# Patient Record
Sex: MALE | Race: WHITE | NOT HISPANIC OR LATINO | Employment: FULL TIME | URBAN - METROPOLITAN AREA
[De-identification: names, ages, dates, MRNs, and addresses within clinical notes are randomized per-mention and may not be internally consistent; named-entity substitution may affect disease eponyms.]

---

## 2018-05-10 NOTE — PATIENT INSTRUCTIONS
1.  Labs today  2.  Cleared for surgery, I will fax preop to Dr. Patel and Dr. Ruiz    Before Your Surgery      Call your surgeon if there is any change in your health. This includes signs of a cold or flu (such as a sore throat, runny nose, cough, rash or fever).    Do not smoke, drink alcohol or take over the counter medicine (unless your surgeon or primary care doctor tells you to) for the 24 hours before and after surgery.    If you take prescribed drugs: Follow your doctor s orders about which medicines to take and which to stop until after surgery.    Eating and drinking prior to surgery: follow the instructions from your surgeon    Take a shower or bath the night before surgery. Use the soap your surgeon gave you to gently clean your skin. If you do not have soap from your surgeon, use your regular soap. Do not shave or scrub the surgery site.  Wear clean pajamas and have clean sheets on your bed.

## 2018-05-10 NOTE — PROGRESS NOTES
[facility]  [address] [place] [address] [id number] [phone]  Dept: [phone]    PRE-OP EVALUATION:  Today's date: 2018    Olu Trinh (: 1985) presents for pre-operative evaluation assessment as requested by Dr. Alistair Patel, Dr. Ruiz.  He requires evaluation and anesthesia risk assessment prior to undergoing surgery/procedure for treatment of epilepsy .    Proposed Surgery/ Procedure: intracranial EEG monitoring off antiseizure medications  Date of Surgery/ Procedure: 18  Time of Surgery/ Procedure: to be determined  Hospital/Surgical Facility: [facility]  Fax number for surgical facility: Dr. Alistair Patel [phone], Dr. Patel [phone]  Primary Physician: Andrew Walton  Type of Anesthesia Anticipated: to be determined    Patient has a Health Care Directive or Living Will:  YES     1. NO - Do you have a history of heart attack, stroke, stent, bypass or surgery on an artery in the head, neck, heart or legs?  2. NO - Do you ever have any pain or discomfort in your chest?  3. NO - Do you have a history of  Heart Failure?  4. NO - Are you troubled by shortness of breath when: walking on the level, up a slight hill or at night?  5. NO - Do you currently have a cold, bronchitis or other respiratory infection?  6. NO - Do you have a cough, shortness of breath or wheezing?  7. NO - Do you sometimes get pains in the calves of your legs when you walk?  8. NO - Do you or anyone in your family have previous history of blood clots?  9. NO - Do you or does anyone in your family have a serious bleeding problem such as prolonged bleeding following surgeries or cuts?  10. NO - Have you ever had problems with anemia or been told to take iron pills?  11. NO - Have you had any abnormal blood loss such as black, tarry or bloody stools, or abnormal vaginal bleeding?  12. NO - Have you ever had a blood transfusion?  13. NO - Have you or any of your relatives ever had  problems with anesthesia?  14. NO - Do you have sleep apnea, excessive snoring or daytime drowsiness?  15. NO - Do you have any prosthetic heart valves?  16. NO - Do you have prosthetic joints?  17. NO - Is there any chance that you may be pregnant?      HPI:     HPI related to upcoming procedure: hx of epilepsy, follows with Dr. Patel neurologist [place] [facility].  Pt continues to have frequent seizures every 1-3 months despite trying multiple antiseizure regimens.  Plan is for intercranial EEG monitoring for 10 days in the hospital while stopping antiseizure meds.  Eventual plan is for surgery to fix seizures.      ADHD- not on medication.  Does fine in day to day life, would probably need medicine if he went back to school    Hx of neurogenic bladder.   h/o bladder extrophy and s/p bladder augmentation, bladder neck reconstruction (Young Mayra Leadbetter) and creation of Mitrofanoff channel in 2002.  Will be seeing urology at [facility] next week, had CT scan done.  He straight caths through his urostomy every 3-5 hours.      Works for [employer].        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 He is not wheel chair bound. He uses 14F cath for CIC.        Other hydronephrosis 06/02/2015     Priority: Medium     Bladder stone 06/02/2015     Priority: Medium     Neurogenic bladder 10/30/2012     Priority: Medium     Problem list name updated by automated process. Provider to review       Attention deficit hyperactivity disorder (ADHD) 11/06/2006     Priority: Medium     Problem list name updated by automated process. Provider to review         Past Medical History:   Diagnosis Date     Attention deficit disorder with hyperactivity(314.01)      Bladder stone 6/2/2015     Exstrophy of bladder sequence 6/29/2015     Learning disability      Neurogenic bladder, NOS 10/30/2012     Other hydronephrosis 6/2/2015     Unspecified epilepsy with intractable epilepsy 10/22/2013     Past Surgical History:   Procedure Laterality Date     BLADDER AUGMENTATION       bladder neck reconstruction      Mikey MONGE REMOVAL OF KIDNEY STONE       LASER HOLMIUM LITHOTRIPSY BLADDER N/A 7/24/2015    Procedure: LASER HOLMIUM LITHOTRIPSY BLADDER;  Surgeon: Joel Blunt MD;  Location: [facility] OR     MITROFANOFF PROCEDURE (APPENDIX CONDUIT)  2002     Current Outpatient Prescriptions   Medication Sig Dispense Refill     carBAMazepine (TEGRETOL XR) 200 MG 12 hr tablet Take 4 tablets (800 mg) by mouth 2 times daily Take 1 tab po pm ( together with 400 mg tab ) 1 tablet 0     lamoTRIgine (LAMICTAL) 100 MG tablet 1.5 tabs twice a day 1 tablet 0     levETIRAcetam 1000 MG TABS 2.5 tabs twice a day 1 tablet 0     [DISCONTINUED] carBAMazepine (TEGRETOL XR) 200 MG 12 hr tablet Take 1 tab po pm ( together with 400 mg tab ) (Patient taking differently: 800 mg every morning Take 1 tab po pm ( together with 400 mg tab )) 30 tablet 2     [DISCONTINUED] carBAMazepine (TEGRETOL XR) 400 MG 12 hr tablet Take 1 tablet (400 mg) by mouth 2 times daily (Patient taking differently: Take 1,000 mg by mouth At Bedtime ) 180 tablet 2     [DISCONTINUED] lamoTRIgine (LAMICTAL) 100 MG tablet 2 tab po am and 1 tab po pm. 270 tablet 1     [DISCONTINUED] levETIRAcetam 1000 MG TABS Take 2 tablets by mouth 2 times daily. 360 tablet 0     OTC products: no recent use of OTC ASA, NSAIDS or Steroids    Allergies   Allergen Reactions     Sulfa Drugs      unknown      Latex Allergy: NO    Social History   Substance Use Topics     Smoking status: Never Smoker     Smokeless tobacco: Never Used     Alcohol  "use Yes      Comment: Alcoholic beverage once or twice per week.     History   Drug Use Not on file       REVIEW OF SYSTEMS:   CONSTITUTIONAL: NEGATIVE for fever, chills, change in weight  INTEGUMENTARY/SKIN: NEGATIVE for worrisome rashes, moles or lesions  EYES: NEGATIVE for vision changes or irritation  ENT/MOUTH: NEGATIVE for ear, mouth and throat problems  RESP: NEGATIVE for significant cough or SOB  CV: NEGATIVE for chest pain, palpitations or peripheral edema  GI: NEGATIVE for nausea, abdominal pain, heartburn, or change in bowel habits   male : as above   MUSCULOSKELETAL: NEGATIVE for significant arthralgias or myalgia  NEURO: as above   PSYCHIATRIC: NEGATIVE for changes in mood or affect    EXAM:   /82  Pulse 55  Temp 97.4  F (36.3  C) (Temporal)  Resp 18  Ht 5' 11.25\" (1.81 m)  Wt 240 lb 8 oz (109.1 kg)  SpO2 99%  BMI 33.31 kg/m2    GENERAL APPEARANCE: healthy, alert and no distress     EYES: EOMI,  PERRL     HENT: ear canals and TM's normal and nose and mouth without ulcers or lesions     NECK: no adenopathy, no asymmetry, masses, or scars and thyroid normal to palpation     RESP: lungs clear to auscultation - no rales, rhonchi or wheezes     CV: regular rates and rhythm, normal S1 S2, no S3 or S4 and no murmur, click or rub     ABDOMEN:  soft, nontender, no HSM or masses and bowel sounds normal     MS: extremities normal- no gross deformities noted, no evidence of inflammation in joints, FROM in all extremities.     SKIN: no suspicious lesions or rashes     NEURO: Normal strength and tone, sensory exam grossly normal, mentation intact and speech normal     PSYCH: mentation appears normal. and affect normal/bright     LYMPHATICS: No cervical adenopathy    DIAGNOSTICS:   EKG: Not indicated due to non-vascular surgery and low risk of event (age <65 and without cardiac risk factors)  BMP pending    Recent Labs   Lab Test  06/01/15   1007  08/08/13   1415   HGB  15.2  15.4   PLT  184  211   NA  " 140  144   POTASSIUM  3.9  4.5   CR  0.87  0.93        IMPRESSION:   Reason for surgery/procedure: epilepsy     The proposed surgical procedure is considered INTERMEDIATE risk.    REVISED CARDIAC RISK INDEX  The patient has the following serious cardiovascular risks for perioperative complications such as (MI, PE, VFib and 3  AV Block):  No serious cardiac risks  INTERPRETATION: 0 risks: Class I (very low risk - 0.4% complication rate)    The patient has the following additional risks for perioperative complications:  No identified additional risks      ICD-10-CM    1. Pre-op exam Z01.818    2. Intractable epilepsy without status epilepticus, unspecified epilepsy type (H) G40.919    3. Neurogenic bladder N31.9 Basic metabolic panel   4. CARDIOVASCULAR SCREENING; LDL GOAL LESS THAN 130 Z13.6 Lipid panel reflex to direct LDL Fasting       RECOMMENDATIONS:     --Consult hospital rounder / IM to assist post-op medical management    --Patient is to take all scheduled medications on the day of surgery EXCEPT as discussed with his neurologist     APPROVAL GIVEN to proceed with proposed procedure, without further diagnostic evaluation       Signed Electronically by: PATRICIA Wilkinson CNP    Copy of this evaluation report is provided to requesting physician.    [facility] Preop Guidelines    Revised Cardiac Risk Index

## 2018-05-11 ENCOUNTER — OFFICE VISIT (OUTPATIENT)
Dept: FAMILY MEDICINE | Facility: CLINIC | Age: 33
End: 2018-05-11
Payer: COMMERCIAL

## 2018-05-11 VITALS
WEIGHT: 240.5 LBS | DIASTOLIC BLOOD PRESSURE: 82 MMHG | BODY MASS INDEX: 33.67 KG/M2 | SYSTOLIC BLOOD PRESSURE: 134 MMHG | TEMPERATURE: 97.4 F | RESPIRATION RATE: 18 BRPM | HEIGHT: 71 IN | HEART RATE: 55 BPM | OXYGEN SATURATION: 99 %

## 2018-05-11 DIAGNOSIS — Z13.6 CARDIOVASCULAR SCREENING; LDL GOAL LESS THAN 130: ICD-10-CM

## 2018-05-11 DIAGNOSIS — Z01.818 PRE-OP EXAM: Primary | ICD-10-CM

## 2018-05-11 DIAGNOSIS — N31.9 NEUROGENIC BLADDER: ICD-10-CM

## 2018-05-11 DIAGNOSIS — G40.919 INTRACTABLE EPILEPSY WITHOUT STATUS EPILEPTICUS, UNSPECIFIED EPILEPSY TYPE (H): ICD-10-CM

## 2018-05-11 PROCEDURE — 99214 OFFICE O/P EST MOD 30 MIN: CPT | Performed by: NURSE PRACTITIONER

## 2018-05-11 PROCEDURE — 80061 LIPID PANEL: CPT | Performed by: NURSE PRACTITIONER

## 2018-05-11 PROCEDURE — 80048 BASIC METABOLIC PNL TOTAL CA: CPT | Performed by: NURSE PRACTITIONER

## 2018-05-11 PROCEDURE — 36415 COLL VENOUS BLD VENIPUNCTURE: CPT | Performed by: NURSE PRACTITIONER

## 2018-05-11 RX ORDER — LAMOTRIGINE 100 MG/1
200 TABLET ORAL
Qty: 1 TABLET | Refills: 0 | COMMUNITY
Start: 2018-05-11 | End: 2021-01-25

## 2018-05-11 RX ORDER — CARBAMAZEPINE 200 MG/1
800 TABLET, EXTENDED RELEASE ORAL 2 TIMES DAILY
Qty: 1 TABLET | Refills: 0 | COMMUNITY
Start: 2018-05-11 | End: 2021-01-25

## 2018-05-11 RX ORDER — LEVETIRACETAM 1000 MG/1
2000 TABLET ORAL
Qty: 1 TABLET | Refills: 0 | COMMUNITY
Start: 2018-05-11 | End: 2021-01-25

## 2018-05-11 NOTE — LETTER
May 14, 2018      Olu Knowles Gayathri  [address]  [facility] [address]        Dear ,    We are writing to inform you of your test results.    Your blood sugar was normal indicating you do not have diabetes.    Normal kidney function and electrolytes.    The results of your recent lipid (cholesterol) profile were abnormal.     Desired or goal lipid levels are:  CHOLESTEROL: Desirable is less than 200.  HDL (Good Cholesterol): Desirable is greater than 40 in men and greater than 50 in women.  LDL (Bad Cholesterol): Desirable is less than 130 or less than 100 in patients with diabetes or vascular disease. For some patients with diabetes or vascular disease, the desireable LDL is less than 70.  TRIGLYCERIDES: Desirable is less than 150.      As you may know, an elevated cholesterol is one factor that increases your risk for heart disease and stroke.  Cholesterol can be somewhat influenced by diet but there is a large genetic component as well.    The Mediterranean diet has some evidence for improving cholesterol and reducing cardiovascular risk.  Such a diet is typically high in fruits, vegetables, whole grains, beans, nuts, and seeds and includes olive oil as an important source of fat; there are typically low to moderate amounts of fish, poultry, and dairy products, and there is little red meat.    Also consider starting or increasing your aerobic activity.  Aerobic activity is the best way to improve HDL (good) cholesterol.  If this would be new to you, please talk with me first about what activities are safe for you.    Given your elevated cholesterol we could consider starting a 'statin' cholesterol medication to lower your risk of heart attack and stroke.  The most common side effects of this medication are muscle aches, but most patients do very well on statins.  Please let me know if you would like to start this medication, or we could schedule an office visit to discuss in more detail.    Starting a 'statin' cholesterol medication would help reduce your risk. This is a once aday pill, it is usually well tolerated, the most common side effect is muscle aches but most of my patients do well on it. Please give me a call if you would like to start this medication or schedule an office visit and we can discuss further.    Resulted Orders   Lipid panel reflex to direct LDL Fasting   Result Value Ref Range    Cholesterol 291 (H) <200 mg/dL      Comment:      Desirable:       <200 mg/dl    Triglycerides 139 <150 mg/dL      Comment:      Non Fasting    HDL Cholesterol 58 >39 mg/dL    LDL Cholesterol Calculated 205 (H) <100 mg/dL      Comment:      Above desirable:  100-129 mg/dl  Borderline High:  130-159 mg/dL  High:             160-189 mg/dL  Very high:       >189 mg/dl      Non HDL Cholesterol 233 (H) <130 mg/dL      Comment:      Above Desirable:  130-159 mg/dl  Borderline high:  160-189 mg/dl  High:             190-219 mg/dl  Very high:       >219 mg/dl     Basic metabolic panel   Result Value Ref Range    Sodium 142 133 - 144 mmol/L    Potassium 4.3 3.4 - 5.3 mmol/L    Chloride 107 94 - 109 mmol/L    Carbon Dioxide 31 20 - 32 mmol/L    Anion Gap 4 3 - 14 mmol/L    Glucose 90 70 - 99 mg/dL      Comment:      Non Fasting    Urea Nitrogen 18 7 - 30 mg/dL    Creatinine 0.81 0.66 - 1.25 mg/dL    GFR Estimate >90 >60 mL/min/1.7m2      Comment:      Non  GFR Calc    GFR Estimate If Black >90 >60 mL/min/1.7m2      Comment:       GFR Calc    Calcium 9.6 8.5 - 10.1 mg/dL       If you have any questions or concerns, please call the clinic at the number listed above.     Sincerely,    Renetta Krause, APRN CNP/nr

## 2018-05-11 NOTE — MR AVS SNAPSHOT
After Visit Summary   5/11/2018    Olu Trinh    MRN: [id number]           Patient Information     Date Of Birth          1985        Visit Information        Provider Department      5/11/2018 11:00 AM Renetta Krause APRN CNP [facility]        Today's Diagnoses     Encounter for routine adult health examination without abnormal findings    -  1    Other hydronephrosis        Neurogenic bladder        CARDIOVASCULAR SCREENING; LDL GOAL LESS THAN 130        Preop general physical exam          Care Instructions    1.  Labs today  2.  Cleared for surgery, I will fax preop to Dr. Patel and Dr. Ruiz    Before Your Surgery      Call your surgeon if there is any change in your health. This includes signs of a cold or flu (such as a sore throat, runny nose, cough, rash or fever).    Do not smoke, drink alcohol or take over the counter medicine (unless your surgeon or primary care doctor tells you to) for the 24 hours before and after surgery.    If you take prescribed drugs: Follow your doctor s orders about which medicines to take and which to stop until after surgery.    Eating and drinking prior to surgery: follow the instructions from your surgeon    Take a shower or bath the night before surgery. Use the soap your surgeon gave you to gently clean your skin. If you do not have soap from your surgeon, use your regular soap. Do not shave or scrub the surgery site.  Wear clean pajamas and have clean sheets on your bed.           Follow-ups after your visit        Who to contact     If you have questions or need follow up information about today's clinic visit or your schedule please contact [facility] directly at [phone].  Normal or non-critical lab and imaging results will be communicated to you by MyChart, letter or phone within 4 business days after the clinic has received the results. If you do not hear from us within 7 days, please contact  "the clinic through [employer] or phone. If you have a critical or abnormal lab result, we will notify you by phone as soon as possible.  Submit refill requests through [employer] or call your pharmacy and they will forward the refill request to us. Please allow 3 business days for your refill to be completed.          Additional Information About Your Visit        [employer] Information     [employer] lets you send messages to your doctor, view your test results, renew your prescriptions, schedule appointments and more. To sign up, go to [email or web address] . Click on \"Log in\" on the left side of the screen, which will take you to the Welcome page. Then click on \"Sign up Now\" on the right side of the page.     You will be asked to enter the access code listed below, as well as some personal information. Please follow the directions to create your username and password.     Your access code is: [email or web address]  Expires: 2018 11:38 AM     Your access code will  in 90 days. If you need help or a new code, please call your [facility] clinic or [phone].        Care EveryWhere ID     This is your Care EveryWhere ID. This could be used by other organizations to access your [facility] medical records  [id number]        Your Vitals Were     Pulse Temperature Respirations Height Pulse Oximetry BMI (Body Mass Index)    55 97.4  F (36.3  C) (Temporal) 18 5' 11.25\" (1.81 m) 99% 33.31 kg/m2       Blood Pressure from Last 3 Encounters:   18 134/82   11/16/15 128/81   10/05/15 148/75    Weight from Last 3 Encounters:   18 240 lb 8 oz (109.1 kg)   11/16/15 241 lb (109.3 kg)   10/05/15 242 lb 11.2 oz (110.1 kg)              We Performed the Following     Basic metabolic panel     Lipid panel reflex to direct LDL Fasting          Today's Medication Changes          These changes are accurate as of 18 11:38 AM.  If you have any questions, ask your nurse or doctor.               These medicines have changed or have " updated prescriptions.        Dose/Directions    carBAMazepine 200 MG 12 hr tablet   Commonly known as:  TEGretol XR   This may have changed:    - how much to take  - how to take this  - when to take this  - Another medication with the same name was removed. Continue taking this medication, and follow the directions you see here.   Changed by:  Renetta Krause APRN CNP        Dose:  800 mg   Take 4 tablets (800 mg) by mouth 2 times daily Take 1 tab po pm ( together with 400 mg tab )   Quantity:  1 tablet   Refills:  0       lamoTRIgine 100 MG tablet   Commonly known as:  LaMICtal   This may have changed:  additional instructions   Changed by:  Renetta Krause APRN CNP        1.5 tabs twice a day   Quantity:  1 tablet   Refills:  0       levETIRAcetam 1000 MG Tabs   This may have changed:  See the new instructions.   Changed by:  Renetta Krause APRN CNP        2.5 tabs twice a day   Quantity:  1 tablet   Refills:  0                Primary Care Provider Office Phone # Fax #    Andrew Walton [phone] [address] [facility] [phone]        Equal Access to Services     [facility]: Hadii daniel stephens hadasho Soeneidaali, waaxda luqadaha, qaybta kaalmada adeegyada, micaela patel . So [facility] [phone].    ATENCIÓN: Si habla español, tiene a recio disposición servicios gratuitos de asistencia lingüística. [facility] [phone].    We comply with applicable federal civil rights laws and [place] laws. We do not discriminate on the basis of race, color, national origin, age, disability, sex, sexual orientation, or gender identity.            Thank you!     Thank you for choosing [facility]  for your care. Our goal is always to provide you with excellent care. Hearing back from our patients is one way we can continue to improve our services. Please take a few minutes to complete the written survey that you may receive in the  mail after your visit with us. Thank you!             Your Updated Medication List - Protect others around you: Learn how to safely use, store and throw away your medicines at www.disposemymeds.org.          This list is accurate as of 5/11/18 11:38 AM.  Always use your most recent med list.                   Brand Name Dispense Instructions for use Diagnosis    carBAMazepine 200 MG 12 hr tablet    TEGretol XR    1 tablet    Take 4 tablets (800 mg) by mouth 2 times daily Take 1 tab po pm ( together with 400 mg tab )        lamoTRIgine 100 MG tablet    LaMICtal    1 tablet    1.5 tabs twice a day        levETIRAcetam 1000 MG Tabs     1 tablet    2.5 tabs twice a day

## 2018-05-12 LAB
ANION GAP SERPL CALCULATED.3IONS-SCNC: 4 MMOL/L (ref 3–14)
BUN SERPL-MCNC: 18 MG/DL (ref 7–30)
CALCIUM SERPL-MCNC: 9.6 MG/DL (ref 8.5–10.1)
CHLORIDE SERPL-SCNC: 107 MMOL/L (ref 94–109)
CHOLEST SERPL-MCNC: 291 MG/DL
CO2 SERPL-SCNC: 31 MMOL/L (ref 20–32)
CREAT SERPL-MCNC: 0.81 MG/DL (ref 0.66–1.25)
GFR SERPL CREATININE-BSD FRML MDRD: >90 ML/MIN/1.7M2
GLUCOSE SERPL-MCNC: 90 MG/DL (ref 70–99)
HDLC SERPL-MCNC: 58 MG/DL
LDLC SERPL CALC-MCNC: 205 MG/DL
NONHDLC SERPL-MCNC: 233 MG/DL
POTASSIUM SERPL-SCNC: 4.3 MMOL/L (ref 3.4–5.3)
SODIUM SERPL-SCNC: 142 MMOL/L (ref 133–144)
TRIGL SERPL-MCNC: 139 MG/DL

## 2018-05-14 NOTE — PROGRESS NOTES
Please send out result letter with the following note, thanks.  Olu,    Your blood sugar was normal indicating you do not have diabetes.    Normal kidney function and electrolytes.    The results of your recent lipid (cholesterol) profile were abnormal.     Desired or goal lipid levels are:  CHOLESTEROL: Desirable is less than 200.  HDL (Good Cholesterol): Desirable is greater than 40 in men and greater than 50 in women.  LDL (Bad Cholesterol): Desirable is less than 130 or less than 100 in patients with diabetes or vascular disease. For some patients with diabetes or vascular disease, the desireable LDL is less than 70.  TRIGLYCERIDES: Desirable is less than 150.      As you may know, an elevated cholesterol is one factor that increases your risk for heart disease and stroke.  Cholesterol can be somewhat influenced by diet but there is a large genetic component as well.    The Mediterranean diet has some evidence for improving cholesterol and reducing cardiovascular risk.  Such a diet is typically high in fruits, vegetables, whole grains, beans, nuts, and seeds and includes olive oil as an important source of fat; there are typically low to moderate amounts of fish, poultry, and dairy products, and there is little red meat.    Also consider starting or increasing your aerobic activity.  Aerobic activity is the best way to improve HDL (good) cholesterol.  If this would be new to you, please talk with me first about what activities are safe for you.    Given your elevated cholesterol we could consider starting a 'statin' cholesterol medication to lower your risk of heart attack and stroke.  The most common side effects of this medication are muscle aches, but most patients do very well on statins.  Please let me know if you would like to start this medication, or we could schedule an office visit to discuss in more detail.   Starting a 'statin' cholesterol medication would help reduce your risk. This is a once  aday pill, it is usually well tolerated, the most common side effect is muscle aches but most of my patients do well on it. Please give me a call if you would like to start this medication or schedule an office visit and we can discuss further.        -Renetta Krause, CNP

## 2018-06-22 NOTE — PROGRESS NOTES
[place] [id number] [place] [id number] [phone]  Dept: [phone]    PRE-OP EVALUATION:  Today's date: 2018    Olu Trinh (: 1985) presents for pre-operative evaluation assessment as requested by Mario Perkins.  He requires evaluation and anesthesia risk assessment prior to undergoing surgery/procedure for treatment of HISTORY OF URINARY TRACT INFECTION ; URETERAL STONE  .    Proposed Surgery/ Procedure: CYSTOSCOPY   Date of Surgery/ Procedure: 7/10/18  Time of Surgery/ Procedure: 3:00pm  Hospital/Surgical Facility: [facility]  Fax number for surgical facility:   Primary Physician: Andrew Walton  Type of Anesthesia Anticipated: to be determined    Patient has a Health Care Directive or Living Will:  NO    1. NO - Do you have a history of heart attack, stroke, stent, bypass or surgery on an artery in the head, neck, heart or legs?  2. NO - Do you ever have any pain or discomfort in your chest?  3. NO - Do you have a history of  Heart Failure?  4. NO - Are you troubled by shortness of breath when: walking on the level, up a slight hill or at night?  5. NO - Do you currently have a cold, bronchitis or other respiratory infection?  6. NO - Do you have a cough, shortness of breath or wheezing?  7. NO - Do you sometimes get pains in the calves of your legs when you walk?  8. NO - Do you or anyone in your family have previous history of blood clots?  9. NO - Do you or does anyone in your family have a serious bleeding problem such as prolonged bleeding following surgeries or cuts?  10. NO - Have you ever had problems with anemia or been told to take iron pills?  11. NO - Have you had any abnormal blood loss such as black, tarry or bloody stools, or abnormal vaginal bleeding?  12. NO - Have you ever had a blood transfusion?  13. NO - Have you or any of your relatives ever had problems with anesthesia?  14. NO - Do you have sleep apnea, excessive  snoring or daytime drowsiness?  15. NO - Do you have any prosthetic heart valves?  16. NO - Do you have prosthetic joints?  17. NO - Is there any chance that you may be pregnant?      HPI:     HPI related to upcoming procedure: CT scan 4/2018 with left 6-7mm stone present in dat bladder and small stone to left kidney, chronic mild distension of renal collecting system.  Scheduled for cystoscopy 7/10/18.  Thinks he will be sedated.  Day surgery.  Not having any urinary symptoms.     Hx of neurogenic bladder.   h/o bladder extrophy and s/p bladder augmentation, bladder neck reconstruction (Young Mayra Leadbetter) and creation of Mitrofanoff channel in 2002.  He straight caths through his urostomy every 3-5 hours.    hx of epilepsy, follows with Dr. Patel neurologist [place] [facility].  Pt continues to have frequent seizures every 1-3 months despite trying multiple antiseizure regimens. hospitalized 5/2018 for intercranial EEG monitoring while stopping antiseizure meds.  Eventual plan is for left frontal lobectomy.  Last seizure within the last month.      Cholesterol very elevated with .  Unsure if family history of high cholesterol.          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 He is not wheel chair bound. He uses 14F cath for CIC.        Other hydronephrosis 06/02/2015     Priority: Medium     Bladder stone 06/02/2015     Priority: Medium     Neurogenic bladder 10/30/2012     Priority: Medium     Problem list name updated by automated process. Provider to review       Attention deficit hyperactivity disorder (ADHD) 11/06/2006     Priority:  Medium     Problem list name updated by automated process. Provider to review        Past Medical History:   Diagnosis Date     Attention deficit disorder with hyperactivity(314.01)      Bladder stone 6/2/2015     Exstrophy of bladder sequence 6/29/2015     Learning disability      Neurogenic bladder, NOS 10/30/2012     Other hydronephrosis 6/2/2015     Unspecified epilepsy with intractable epilepsy 10/22/2013     Past Surgical History:   Procedure Laterality Date     BLADDER AUGMENTATION       bladder neck reconstruction      Mikey MONGE REMOVAL OF KIDNEY STONE       LASER HOLMIUM LITHOTRIPSY BLADDER N/A 7/24/2015    Procedure: LASER HOLMIUM LITHOTRIPSY BLADDER;  Surgeon: Joel Blunt MD;  Location: [facility] OR     MITROFANOFF PROCEDURE (APPENDIX CONDUIT)  2002     Current Outpatient Prescriptions   Medication Sig Dispense Refill     carBAMazepine (TEGRETOL XR) 200 MG 12 hr tablet Take 4 tablets (800 mg) by mouth 2 times daily Take 1 tab po pm ( together with 400 mg tab ) 1 tablet 0     lamoTRIgine (LAMICTAL) 100 MG tablet 1.5 tabs twice a day 1 tablet 0     levETIRAcetam 1000 MG TABS 2.5 tabs twice a day 1 tablet 0     OTC products: no recent use of OTC ASA, NSAIDS or Steroids    Allergies   Allergen Reactions     Sulfa Drugs      unknown      Latex Allergy: NO    Social History   Substance Use Topics     Smoking status: Never Smoker     Smokeless tobacco: Never Used     Alcohol use Yes      Comment: Alcoholic beverage once or twice per week.     History   Drug Use Not on file       REVIEW OF SYSTEMS:   CONSTITUTIONAL: NEGATIVE for fever, chills, change in weight  INTEGUMENTARY/SKIN: NEGATIVE for worrisome rashes, moles or lesions  EYES: NEGATIVE for vision changes or irritation  ENT/MOUTH: NEGATIVE for ear, mouth and throat problems  RESP: NEGATIVE for significant cough or SOB  CV: NEGATIVE for chest pain, palpitations or peripheral edema  GI: NEGATIVE for nausea, abdominal pain, heartburn, or  change in bowel habits  : NEGATIVE for frequency, dysuria, or hematuria  MUSCULOSKELETAL: NEGATIVE for significant arthralgias or myalgia  NEURO: as above     EXAM:   /75 (BP Location: Left arm, Patient Position: Sitting, Cuff Size: Adult Large)  Pulse 57  Temp 97.2  F (36.2  C) (Tympanic)  Resp 23  Wt 231 lb (104.8 kg)  SpO2 96%  BMI 31.99 kg/m2    GENERAL APPEARANCE: healthy, alert and no distress     EYES: EOMI,  PERRL     HENT: ear canals and TM's normal and nose and mouth without ulcers or lesions     NECK: no adenopathy, no asymmetry, masses, or scars and thyroid normal to palpation     RESP: lungs clear to auscultation - no rales, rhonchi or wheezes     CV: regular rates and rhythm, normal S1 S2, no S3 or S4 and no murmur, click or rub     ABDOMEN:  soft, nontender, no HSM or masses and bowel sounds normal     MS: extremities normal- no gross deformities noted, no evidence of inflammation in joints, FROM in all extremities.     SKIN: no suspicious lesions or rashes     NEURO: Normal strength and tone, sensory exam grossly normal, mentation intact and speech normal     PSYCH: mentation appears normal. and affect normal/bright     LYMPHATICS: No cervical adenopathy    DIAGNOSTICS:   EKG: Not indicated due to non-vascular surgery and low risk of event (age <65 and without cardiac risk factors)    Recent Labs   Lab Test  05/11/18   1143  06/01/15   1007  08/08/13   1415   HGB   --   15.2  15.4   PLT   --   184  211   NA  142  140  144   POTASSIUM  4.3  3.9  4.5   CR  0.81  0.87  0.93        IMPRESSION:   Reason for surgery/procedure: ureteral stone    The proposed surgical procedure is considered LOW risk.    REVISED CARDIAC RISK INDEX  The patient has the following serious cardiovascular risks for perioperative complications such as (MI, PE, VFib and 3  AV Block):  No serious cardiac risks  INTERPRETATION: 0 risks: Class I (very low risk - 0.4% complication rate)    The patient has the following  additional risks for perioperative complications:  Seizure disorder with uncontrolled seizures       ICD-10-CM    1. Preop general physical exam Z01.818    2. Ureteral stone N20.1    3. Intractable epilepsy without status epilepticus, unspecified epilepsy type (H) G40.919    4. Hyperlipidemia LDL goal <130 E78.5        RECOMMENDATIONS:     --Consult hospital rounder / IM to assist post-op medical management    --Patient is to take all scheduled medications on the day of surgery     APPROVAL GIVEN to proceed with proposed procedure, without further diagnostic evaluation     We discussed elevated cholesterol, recommended diet change including focus on mediterranean diet.  Will plan for physical in 3 months and recheck at that time (pending neurosurgery).      Signed Electronically by: PATRICIA Wilkinson CNP    Copy of this evaluation report is provided to requesting physician.    [facility] Preop Guidelines    Revised Cardiac Risk Index

## 2018-06-25 ENCOUNTER — OFFICE VISIT (OUTPATIENT)
Dept: FAMILY MEDICINE | Facility: CLINIC | Age: 33
End: 2018-06-25
Payer: COMMERCIAL

## 2018-06-25 VITALS
BODY MASS INDEX: 31.99 KG/M2 | TEMPERATURE: 97.2 F | HEART RATE: 57 BPM | SYSTOLIC BLOOD PRESSURE: 123 MMHG | OXYGEN SATURATION: 96 % | WEIGHT: 231 LBS | DIASTOLIC BLOOD PRESSURE: 75 MMHG | RESPIRATION RATE: 23 BRPM

## 2018-06-25 DIAGNOSIS — Z01.818 PREOP GENERAL PHYSICAL EXAM: Primary | ICD-10-CM

## 2018-06-25 DIAGNOSIS — G40.919 INTRACTABLE EPILEPSY WITHOUT STATUS EPILEPTICUS, UNSPECIFIED EPILEPSY TYPE (H): ICD-10-CM

## 2018-06-25 DIAGNOSIS — E78.5 HYPERLIPIDEMIA LDL GOAL <130: ICD-10-CM

## 2018-06-25 DIAGNOSIS — N20.1 URETERAL STONE: ICD-10-CM

## 2018-06-25 PROCEDURE — 99214 OFFICE O/P EST MOD 30 MIN: CPT | Performed by: NURSE PRACTITIONER

## 2018-06-25 NOTE — MR AVS SNAPSHOT
After Visit Summary   6/25/2018    Olu Trinh    MRN: [id number]           Patient Information     Date Of Birth          1985        Visit Information        Provider Department      6/25/2018 4:20 PM Renetta Krause APRN [facility]        Today's Diagnoses     Preop general physical exam    -  1    Hyperlipidemia LDL goal <130          Care Instructions    1.   Cholesterol is elevated, look at mediterranean diet and see if you can make diet changes to lower cholesterol.  Let me know if you want to meet with nutritionist.  Follow up in 3 months for physical, will recheck at that time.      The Mediterranean diet has some evidence for improving cholesterol and reducing cardiovascular risk.  Such a diet is typically high in fruits, vegetables, whole grains, beans, nuts, and seeds and includes olive oil as an important source of fat; there are typically low to moderate amounts of fish, poultry, and dairy products, and there is little red meat.    Also consider starting or increasing your aerobic activity.  Aerobic activity is the best way to improve HDL (good) cholesterol.  If this would be new to you, please talk with me first about what activities are safe for you.    2.  Cleared for surgery, take your antiseizure the morning of surgery.            Before Your Surgery      Call your surgeon if there is any change in your health. This includes signs of a cold or flu (such as a sore throat, runny nose, cough, rash or fever).    Do not smoke, drink alcohol or take over the counter medicine (unless your surgeon or primary care doctor tells you to) for the 24 hours before and after surgery.    If you take prescribed drugs: Follow your doctor s orders about which medicines to take and which to stop until after surgery.    Eating and drinking prior to surgery: follow the instructions from your surgeon    Take a shower or bath the night before surgery. Use the soap  your surgeon gave you to gently clean your skin. If you do not have soap from your surgeon, use your regular soap. Do not shave or scrub the surgery site.  Wear clean pajamas and have clean sheets on your bed.           Follow-ups after your visit        Your next 10 appointments already scheduled     Jul 10, 2018   Procedure with Mario Ibrahim MD   [facility] (--)    [address], [address]  [facility] [place] [address] [phone]              Who to contact     If you have questions or need follow up information about today's clinic visit or your schedule please contact [facility] directly at [phone].  Normal or non-critical lab and imaging results will be communicated to you by MyChart, letter or phone within 4 business days after the clinic has received the results. If you do not hear from us within 7 days, please contact the clinic through MyChart or phone. If you have a critical or abnormal lab result, we will notify you by phone as soon as possible.  Submit refill requests through [facility] or call your pharmacy and they will forward the refill request to us. Please allow 3 business days for your refill to be completed.          Additional Information About Your Visit        Care EveryWhere ID     This is your Care EveryWhere ID. This could be used by other organizations to access your [place] medical records  [id number]        Your Vitals Were     Pulse Temperature Respirations Pulse Oximetry BMI (Body Mass Index)       57 97.2  F (36.2  C) (Tympanic) 23 96% 31.99 kg/m2        Blood Pressure from Last 3 Encounters:   06/25/18 123/75   05/11/18 134/82   11/16/15 128/81    Weight from Last 3 Encounters:   06/25/18 231 lb (104.8 kg)   05/11/18 240 lb 8 oz (109.1 kg)   11/16/15 241 lb (109.3 kg)              Today, you had the following     No orders found for display       Primary Care Provider Office Phone # Fax #    Andrew Walton [phone]        [address] [facility] [address]        Equal Access to Services     TAMIRKENYETTA JUSTIN : Hadii aad ku hadhanyeva Soearle, waaugieda luemersonadaha, qaloraineta kaalmada fermin, micaela humphreyslesariane burrell. So [facility] [phone].    ATENCIÓN: Si habla español, tiene a recio disposición servicios gratuitos de asistencia lingüística. Llame al [phone].    We comply with applicable federal civil rights laws and [place] laws. We do not discriminate on the basis of race, color, national origin, age, disability, sex, sexual orientation, or gender identity.            Thank you!     Thank you for choosing [facility]  for your care. Our goal is always to provide you with excellent care. Hearing back from our patients is one way we can continue to improve our services. Please take a few minutes to complete the written survey that you may receive in the mail after your visit with us. Thank you!             Your Updated Medication List - Protect others around you: Learn how to safely use, store and throw away your medicines at www.disposemymeds.org.          This list is accurate as of 6/25/18  4:22 PM.  Always use your most recent med list.                   Brand Name Dispense Instructions for use Diagnosis    carBAMazepine 200 MG 12 hr tablet    TEGretol XR    1 tablet    Take 4 tablets (800 mg) by mouth 2 times daily Take 1 tab po pm ( together with 400 mg tab )        lamoTRIgine 100 MG tablet    LaMICtal    1 tablet    1.5 tabs twice a day        levETIRAcetam 1000 MG Tabs     1 tablet    2.5 tabs twice a day

## 2018-07-06 NOTE — H&P (VIEW-ONLY)
[place] [id number] [place] [id number] [phone]  Dept: [phone]    PRE-OP EVALUATION:  Today's date: 2018    Olu Trinh (: 1985) presents for pre-operative evaluation assessment as requested by Mario Perkins.  He requires evaluation and anesthesia risk assessment prior to undergoing surgery/procedure for treatment of HISTORY OF URINARY TRACT INFECTION ; URETERAL STONE  .    Proposed Surgery/ Procedure: CYSTOSCOPY   Date of Surgery/ Procedure: 7/10/18  Time of Surgery/ Procedure: 3:00pm  Hospital/Surgical Facility: [facility]  Fax number for surgical facility:   Primary Physician: Andrew Walton  Type of Anesthesia Anticipated: to be determined    Patient has a Health Care Directive or Living Will:  NO    1. NO - Do you have a history of heart attack, stroke, stent, bypass or surgery on an artery in the head, neck, heart or legs?  2. NO - Do you ever have any pain or discomfort in your chest?  3. NO - Do you have a history of  Heart Failure?  4. NO - Are you troubled by shortness of breath when: walking on the level, up a slight hill or at night?  5. NO - Do you currently have a cold, bronchitis or other respiratory infection?  6. NO - Do you have a cough, shortness of breath or wheezing?  7. NO - Do you sometimes get pains in the calves of your legs when you walk?  8. NO - Do you or anyone in your family have previous history of blood clots?  9. NO - Do you or does anyone in your family have a serious bleeding problem such as prolonged bleeding following surgeries or cuts?  10. NO - Have you ever had problems with anemia or been told to take iron pills?  11. NO - Have you had any abnormal blood loss such as black, tarry or bloody stools, or abnormal vaginal bleeding?  12. NO - Have you ever had a blood transfusion?  13. NO - Have you or any of your relatives ever had problems with anesthesia?  14. NO - Do you have sleep apnea, excessive  snoring or daytime drowsiness?  15. NO - Do you have any prosthetic heart valves?  16. NO - Do you have prosthetic joints?  17. NO - Is there any chance that you may be pregnant?      HPI:     HPI related to upcoming procedure: CT scan 4/2018 with left 6-7mm stone present in dat bladder and small stone to left kidney, chronic mild distension of renal collecting system.  Scheduled for cystoscopy 7/10/18.  Thinks he will be sedated.  Day surgery.  Not having any urinary symptoms.     Hx of neurogenic bladder.   h/o bladder extrophy and s/p bladder augmentation, bladder neck reconstruction (Young Mayra Leadbetter) and creation of Mitrofanoff channel in 2002.  He straight caths through his urostomy every 3-5 hours.    hx of epilepsy, follows with Dr. Patel neurologist [place] [facility].  Pt continues to have frequent seizures every 1-3 months despite trying multiple antiseizure regimens. hospitalized 5/2018 for intercranial EEG monitoring while stopping antiseizure meds.  Eventual plan is for left frontal lobectomy.  Last seizure within the last month.      Cholesterol very elevated with .  Unsure if family history of high cholesterol.          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 He is not wheel chair bound. He uses 14F cath for CIC.        Other hydronephrosis 06/02/2015     Priority: Medium     Bladder stone 06/02/2015     Priority: Medium     Neurogenic bladder 10/30/2012     Priority: Medium     Problem list name updated by automated process. Provider to review       Attention deficit hyperactivity disorder (ADHD) 11/06/2006     Priority:  Medium     Problem list name updated by automated process. Provider to review        Past Medical History:   Diagnosis Date     Attention deficit disorder with hyperactivity(314.01)      Bladder stone 6/2/2015     Exstrophy of bladder sequence 6/29/2015     Learning disability      Neurogenic bladder, NOS 10/30/2012     Other hydronephrosis 6/2/2015     Unspecified epilepsy with intractable epilepsy 10/22/2013     Past Surgical History:   Procedure Laterality Date     BLADDER AUGMENTATION       bladder neck reconstruction      Mikey MONGE REMOVAL OF KIDNEY STONE       LASER HOLMIUM LITHOTRIPSY BLADDER N/A 7/24/2015    Procedure: LASER HOLMIUM LITHOTRIPSY BLADDER;  Surgeon: Joel Blunt MD;  Location: [facility] OR     MITROFANOFF PROCEDURE (APPENDIX CONDUIT)  2002     Current Outpatient Prescriptions   Medication Sig Dispense Refill     carBAMazepine (TEGRETOL XR) 200 MG 12 hr tablet Take 4 tablets (800 mg) by mouth 2 times daily Take 1 tab po pm ( together with 400 mg tab ) 1 tablet 0     lamoTRIgine (LAMICTAL) 100 MG tablet 1.5 tabs twice a day 1 tablet 0     levETIRAcetam 1000 MG TABS 2.5 tabs twice a day 1 tablet 0     OTC products: no recent use of OTC ASA, NSAIDS or Steroids    Allergies   Allergen Reactions     Sulfa Drugs      unknown      Latex Allergy: NO    Social History   Substance Use Topics     Smoking status: Never Smoker     Smokeless tobacco: Never Used     Alcohol use Yes      Comment: Alcoholic beverage once or twice per week.     History   Drug Use Not on file       REVIEW OF SYSTEMS:   CONSTITUTIONAL: NEGATIVE for fever, chills, change in weight  INTEGUMENTARY/SKIN: NEGATIVE for worrisome rashes, moles or lesions  EYES: NEGATIVE for vision changes or irritation  ENT/MOUTH: NEGATIVE for ear, mouth and throat problems  RESP: NEGATIVE for significant cough or SOB  CV: NEGATIVE for chest pain, palpitations or peripheral edema  GI: NEGATIVE for nausea, abdominal pain, heartburn, or  change in bowel habits  : NEGATIVE for frequency, dysuria, or hematuria  MUSCULOSKELETAL: NEGATIVE for significant arthralgias or myalgia  NEURO: as above     EXAM:   /75 (BP Location: Left arm, Patient Position: Sitting, Cuff Size: Adult Large)  Pulse 57  Temp 97.2  F (36.2  C) (Tympanic)  Resp 23  Wt 231 lb (104.8 kg)  SpO2 96%  BMI 31.99 kg/m2    GENERAL APPEARANCE: healthy, alert and no distress     EYES: EOMI,  PERRL     HENT: ear canals and TM's normal and nose and mouth without ulcers or lesions     NECK: no adenopathy, no asymmetry, masses, or scars and thyroid normal to palpation     RESP: lungs clear to auscultation - no rales, rhonchi or wheezes     CV: regular rates and rhythm, normal S1 S2, no S3 or S4 and no murmur, click or rub     ABDOMEN:  soft, nontender, no HSM or masses and bowel sounds normal     MS: extremities normal- no gross deformities noted, no evidence of inflammation in joints, FROM in all extremities.     SKIN: no suspicious lesions or rashes     NEURO: Normal strength and tone, sensory exam grossly normal, mentation intact and speech normal     PSYCH: mentation appears normal. and affect normal/bright     LYMPHATICS: No cervical adenopathy    DIAGNOSTICS:   EKG: Not indicated due to non-vascular surgery and low risk of event (age <65 and without cardiac risk factors)    Recent Labs   Lab Test  05/11/18   1143  06/01/15   1007  08/08/13   1415   HGB   --   15.2  15.4   PLT   --   184  211   NA  142  140  144   POTASSIUM  4.3  3.9  4.5   CR  0.81  0.87  0.93        IMPRESSION:   Reason for surgery/procedure: ureteral stone    The proposed surgical procedure is considered LOW risk.    REVISED CARDIAC RISK INDEX  The patient has the following serious cardiovascular risks for perioperative complications such as (MI, PE, VFib and 3  AV Block):  No serious cardiac risks  INTERPRETATION: 0 risks: Class I (very low risk - 0.4% complication rate)    The patient has the following  additional risks for perioperative complications:  Seizure disorder with uncontrolled seizures       ICD-10-CM    1. Preop general physical exam Z01.818    2. Ureteral stone N20.1    3. Intractable epilepsy without status epilepticus, unspecified epilepsy type (H) G40.919    4. Hyperlipidemia LDL goal <130 E78.5        RECOMMENDATIONS:     --Consult hospital rounder / IM to assist post-op medical management    --Patient is to take all scheduled medications on the day of surgery     APPROVAL GIVEN to proceed with proposed procedure, without further diagnostic evaluation     We discussed elevated cholesterol, recommended diet change including focus on mediterranean diet.  Will plan for physical in 3 months and recheck at that time (pending neurosurgery).      Signed Electronically by: PATRICIA Wilkinson CNP    Copy of this evaluation report is provided to requesting physician.    [facility] Preop Guidelines    Revised Cardiac Risk Index

## 2018-07-10 ENCOUNTER — SURGERY (OUTPATIENT)
Age: 33
End: 2018-07-10

## 2018-07-10 ENCOUNTER — HOSPITAL ENCOUNTER (OUTPATIENT)
Facility: CLINIC | Age: 33
Discharge: HOME | End: 2018-07-10
Attending: UROLOGY | Admitting: UROLOGY
Payer: COMMERCIAL

## 2018-07-10 ENCOUNTER — ANESTHESIA (OUTPATIENT)
Dept: SURGERY | Facility: CLINIC | Age: 33
End: 2018-07-10
Payer: COMMERCIAL

## 2018-07-10 ENCOUNTER — APPOINTMENT (OUTPATIENT)
Dept: GENERAL RADIOLOGY | Facility: CLINIC | Age: 33
End: 2018-07-10
Attending: UROLOGY
Payer: COMMERCIAL

## 2018-07-10 ENCOUNTER — ANESTHESIA EVENT (OUTPATIENT)
Dept: SURGERY | Facility: CLINIC | Age: 33
End: 2018-07-10
Payer: COMMERCIAL

## 2018-07-10 VITALS
HEART RATE: 48 BPM | OXYGEN SATURATION: 99 % | HEIGHT: 72 IN | RESPIRATION RATE: 16 BRPM | SYSTOLIC BLOOD PRESSURE: 133 MMHG | TEMPERATURE: 97 F | WEIGHT: 234 LBS | BODY MASS INDEX: 31.69 KG/M2 | DIASTOLIC BLOOD PRESSURE: 87 MMHG

## 2018-07-10 PROCEDURE — 27210794 ZZH OR GENERAL SUPPLY STERILE: Performed by: UROLOGY

## 2018-07-10 PROCEDURE — 36000063 ZZH SURGERY LEVEL 4 EA 15 ADDTL MIN: Performed by: UROLOGY

## 2018-07-10 PROCEDURE — 40000277 XR SURGERY CARM FLUORO LESS THAN 5 MIN W STILLS

## 2018-07-10 PROCEDURE — 37000008 ZZH ANESTHESIA TECHNICAL FEE, 1ST 30 MIN: Performed by: UROLOGY

## 2018-07-10 PROCEDURE — 71000027 ZZH RECOVERY PHASE 2 EACH 15 MINS: Performed by: UROLOGY

## 2018-07-10 PROCEDURE — 71000013 ZZH RECOVERY PHASE 1 LEVEL 1 EA ADDTL HR: Performed by: UROLOGY

## 2018-07-10 PROCEDURE — 40000170 ZZH STATISTIC PRE-PROCEDURE ASSESSMENT II: Performed by: UROLOGY

## 2018-07-10 PROCEDURE — 25000128 H RX IP 250 OP 636: Performed by: UROLOGY

## 2018-07-10 PROCEDURE — 71000012 ZZH RECOVERY PHASE 1 LEVEL 1 FIRST HR: Performed by: UROLOGY

## 2018-07-10 PROCEDURE — 37000009 ZZH ANESTHESIA TECHNICAL FEE, EACH ADDTL 15 MIN: Performed by: UROLOGY

## 2018-07-10 PROCEDURE — 36000093 ZZH SURGERY LEVEL 4 1ST 30 MIN: Performed by: UROLOGY

## 2018-07-10 PROCEDURE — 25000125 ZZHC RX 250: Performed by: NURSE ANESTHETIST, CERTIFIED REGISTERED

## 2018-07-10 PROCEDURE — 25000128 H RX IP 250 OP 636: Performed by: NURSE ANESTHETIST, CERTIFIED REGISTERED

## 2018-07-10 PROCEDURE — 27210995 ZZH RX 272: Performed by: UROLOGY

## 2018-07-10 PROCEDURE — 25000566 ZZH SEVOFLURANE, EA 15 MIN: Performed by: UROLOGY

## 2018-07-10 RX ORDER — ONDANSETRON 2 MG/ML
4 INJECTION INTRAMUSCULAR; INTRAVENOUS EVERY 30 MIN PRN
Status: DISCONTINUED | OUTPATIENT
Start: 2018-07-10 | End: 2018-07-10 | Stop reason: HOSPADM

## 2018-07-10 RX ORDER — SODIUM CHLORIDE, SODIUM LACTATE, POTASSIUM CHLORIDE, CALCIUM CHLORIDE 600; 310; 30; 20 MG/100ML; MG/100ML; MG/100ML; MG/100ML
INJECTION, SOLUTION INTRAVENOUS CONTINUOUS PRN
Status: DISCONTINUED | OUTPATIENT
Start: 2018-07-10 | End: 2018-07-10

## 2018-07-10 RX ORDER — MEPERIDINE HYDROCHLORIDE 25 MG/ML
12.5 INJECTION INTRAMUSCULAR; INTRAVENOUS; SUBCUTANEOUS
Status: DISCONTINUED | OUTPATIENT
Start: 2018-07-10 | End: 2018-07-10 | Stop reason: HOSPADM

## 2018-07-10 RX ORDER — NALOXONE HYDROCHLORIDE 0.4 MG/ML
.1-.4 INJECTION, SOLUTION INTRAMUSCULAR; INTRAVENOUS; SUBCUTANEOUS
Status: DISCONTINUED | OUTPATIENT
Start: 2018-07-10 | End: 2018-07-10 | Stop reason: HOSPADM

## 2018-07-10 RX ORDER — GLYCOPYRROLATE 0.2 MG/ML
INJECTION, SOLUTION INTRAMUSCULAR; INTRAVENOUS PRN
Status: DISCONTINUED | OUTPATIENT
Start: 2018-07-10 | End: 2018-07-10

## 2018-07-10 RX ORDER — HYDROMORPHONE HYDROCHLORIDE 1 MG/ML
.3-.5 INJECTION, SOLUTION INTRAMUSCULAR; INTRAVENOUS; SUBCUTANEOUS EVERY 10 MIN PRN
Status: DISCONTINUED | OUTPATIENT
Start: 2018-07-10 | End: 2018-07-10 | Stop reason: HOSPADM

## 2018-07-10 RX ORDER — FENTANYL CITRATE 50 UG/ML
25-50 INJECTION, SOLUTION INTRAMUSCULAR; INTRAVENOUS
Status: DISCONTINUED | OUTPATIENT
Start: 2018-07-10 | End: 2018-07-10 | Stop reason: HOSPADM

## 2018-07-10 RX ORDER — LIDOCAINE HYDROCHLORIDE 20 MG/ML
INJECTION, SOLUTION INFILTRATION; PERINEURAL PRN
Status: DISCONTINUED | OUTPATIENT
Start: 2018-07-10 | End: 2018-07-10

## 2018-07-10 RX ORDER — SODIUM CHLORIDE, SODIUM LACTATE, POTASSIUM CHLORIDE, CALCIUM CHLORIDE 600; 310; 30; 20 MG/100ML; MG/100ML; MG/100ML; MG/100ML
INJECTION, SOLUTION INTRAVENOUS CONTINUOUS
Status: DISCONTINUED | OUTPATIENT
Start: 2018-07-10 | End: 2018-07-10 | Stop reason: HOSPADM

## 2018-07-10 RX ORDER — DEXAMETHASONE SODIUM PHOSPHATE 4 MG/ML
INJECTION, SOLUTION INTRA-ARTICULAR; INTRALESIONAL; INTRAMUSCULAR; INTRAVENOUS; SOFT TISSUE PRN
Status: DISCONTINUED | OUTPATIENT
Start: 2018-07-10 | End: 2018-07-10

## 2018-07-10 RX ORDER — HYDRALAZINE HYDROCHLORIDE 20 MG/ML
2.5-5 INJECTION INTRAMUSCULAR; INTRAVENOUS EVERY 10 MIN PRN
Status: DISCONTINUED | OUTPATIENT
Start: 2018-07-10 | End: 2018-07-10 | Stop reason: HOSPADM

## 2018-07-10 RX ORDER — PROPOFOL 10 MG/ML
INJECTION, EMULSION INTRAVENOUS PRN
Status: DISCONTINUED | OUTPATIENT
Start: 2018-07-10 | End: 2018-07-10

## 2018-07-10 RX ORDER — ONDANSETRON 2 MG/ML
INJECTION INTRAMUSCULAR; INTRAVENOUS PRN
Status: DISCONTINUED | OUTPATIENT
Start: 2018-07-10 | End: 2018-07-10

## 2018-07-10 RX ORDER — CEFAZOLIN SODIUM 2 G/100ML
2 INJECTION, SOLUTION INTRAVENOUS
Status: CANCELLED | OUTPATIENT
Start: 2018-07-10

## 2018-07-10 RX ORDER — CEFAZOLIN SODIUM 2 G/100ML
2 INJECTION, SOLUTION INTRAVENOUS
Status: COMPLETED | OUTPATIENT
Start: 2018-07-10 | End: 2018-07-10

## 2018-07-10 RX ORDER — ALBUTEROL SULFATE 0.83 MG/ML
2.5 SOLUTION RESPIRATORY (INHALATION) EVERY 4 HOURS PRN
Status: DISCONTINUED | OUTPATIENT
Start: 2018-07-10 | End: 2018-07-10 | Stop reason: HOSPADM

## 2018-07-10 RX ORDER — ONDANSETRON 4 MG/1
4 TABLET, ORALLY DISINTEGRATING ORAL EVERY 30 MIN PRN
Status: DISCONTINUED | OUTPATIENT
Start: 2018-07-10 | End: 2018-07-10 | Stop reason: HOSPADM

## 2018-07-10 RX ORDER — FENTANYL CITRATE 50 UG/ML
INJECTION, SOLUTION INTRAMUSCULAR; INTRAVENOUS PRN
Status: DISCONTINUED | OUTPATIENT
Start: 2018-07-10 | End: 2018-07-10

## 2018-07-10 RX ORDER — EPHEDRINE SULFATE 50 MG/ML
INJECTION, SOLUTION INTRAMUSCULAR; INTRAVENOUS; SUBCUTANEOUS PRN
Status: DISCONTINUED | OUTPATIENT
Start: 2018-07-10 | End: 2018-07-10

## 2018-07-10 RX ADMIN — LIDOCAINE HYDROCHLORIDE 100 MG: 20 INJECTION, SOLUTION INFILTRATION; PERINEURAL at 11:36

## 2018-07-10 RX ADMIN — GLYCOPYRROLATE 0.2 MG: 0.2 INJECTION, SOLUTION INTRAMUSCULAR; INTRAVENOUS at 12:05

## 2018-07-10 RX ADMIN — PROPOFOL 200 MG: 10 INJECTION, EMULSION INTRAVENOUS at 11:36

## 2018-07-10 RX ADMIN — DEXAMETHASONE SODIUM PHOSPHATE 4 MG: 4 INJECTION, SOLUTION INTRA-ARTICULAR; INTRALESIONAL; INTRAMUSCULAR; INTRAVENOUS; SOFT TISSUE at 11:51

## 2018-07-10 RX ADMIN — ONDANSETRON 4 MG: 2 INJECTION INTRAMUSCULAR; INTRAVENOUS at 11:58

## 2018-07-10 RX ADMIN — PROPOFOL 50 MG: 10 INJECTION, EMULSION INTRAVENOUS at 11:38

## 2018-07-10 RX ADMIN — MIDAZOLAM 2 MG: 1 INJECTION INTRAMUSCULAR; INTRAVENOUS at 11:35

## 2018-07-10 RX ADMIN — SODIUM CHLORIDE, POTASSIUM CHLORIDE, SODIUM LACTATE AND CALCIUM CHLORIDE: 600; 310; 30; 20 INJECTION, SOLUTION INTRAVENOUS at 12:24

## 2018-07-10 RX ADMIN — FENTANYL CITRATE 50 MCG: 50 INJECTION, SOLUTION INTRAMUSCULAR; INTRAVENOUS at 11:36

## 2018-07-10 RX ADMIN — Medication 5 MG: at 12:04

## 2018-07-10 RX ADMIN — Medication 5 MG: at 12:01

## 2018-07-10 RX ADMIN — WATER 3000 ML: 100 IRRIGANT IRRIGATION at 12:06

## 2018-07-10 RX ADMIN — CEFAZOLIN SODIUM 2 G: 2 INJECTION, SOLUTION INTRAVENOUS at 11:44

## 2018-07-10 RX ADMIN — SODIUM CHLORIDE, POTASSIUM CHLORIDE, SODIUM LACTATE AND CALCIUM CHLORIDE: 600; 310; 30; 20 INJECTION, SOLUTION INTRAVENOUS at 11:35

## 2018-07-10 RX ADMIN — WATER 3000 ML: 100 IRRIGANT IRRIGATION at 11:57

## 2018-07-10 ASSESSMENT — ENCOUNTER SYMPTOMS
DYSRHYTHMIAS: 0
SEIZURES: 1

## 2018-07-10 ASSESSMENT — COPD QUESTIONNAIRES: COPD: 0

## 2018-07-10 ASSESSMENT — LIFESTYLE VARIABLES: TOBACCO_USE: 0

## 2018-07-10 NOTE — IP AVS SNAPSHOT
[facility] [address]    [place] [address]    Phone:  [phone]                                       After Visit Summary   7/10/2018    Olu Trinh    MRN: [id number]           After Visit Summary Signature Page     I have received my discharge instructions, and my questions have been answered. I have discussed any challenges I see with this plan with the nurse or doctor.    ..........................................................................................................................................  Patient/Patient Representative Signature      ..........................................................................................................................................  Patient Representative Print Name and Relationship to Patient    ..................................................               ................................................  Date                                            Time    ..........................................................................................................................................  Reviewed by Signature/Title    ...................................................              ..............................................  Date                                                            Time

## 2018-07-10 NOTE — IP AVS SNAPSHOT
MRN:[id number]                      After Visit Summary   7/10/2018    Olu Trinh    MRN: [id number]           Thank you!     Thank you for choosing [facility] for your care. Our goal is always to provide you with excellent care. Hearing back from our patients is one way we can continue to improve our services. Please take a few minutes to complete the written survey that you may receive in the mail after you visit with us. Thank you!        Patient Information     Date Of Birth          1985        About your hospital stay     You were admitted on:  July 10, 2018 You last received care in the:  [facility] PACU    You were discharged on:  July 10, 2018       Who to Call     For medical emergencies, please call 911.  For non-urgent questions about your medical care, please call your primary care provider or clinic, [phone]  For questions related to your surgery, please call your surgery clinic        Attending Provider     Provider Mario Still MD Urology       Primary Care Provider Office Phone # Fax #    Andrew Walton [phone]      After Care Instructions     Discharge Instructions       Patient to call if problems            Encourage fluids       Encourage fluids at home to keep urine clear to light pink                  Further instructions from your care team       Same Day Surgery Discharge Instructions for  Sedation and General Anesthesia       It's not unusual to feel dizzy, light-headed or faint for up to 24 hours after surgery or while taking pain medication.  If you have these symptoms: sit for a few minutes before standing and have someone assist you when you get up to walk or use the bathroom.      You should rest and relax for the next 24 hours. We recommend you make arrangements to have an adult stay with you for at least 24 hours after your discharge.  Avoid hazardous and strenuous activity.      DO NOT DRIVE any  vehicle or operate mechanical equipment for 24 hours following the end of your surgery.  Even though you may feel normal, your reactions may be affected by the medication you have received.      Do not drink alcoholic beverages for 24 hours following surgery.       Slowly progress to your regular diet as you feel able. It's not unusual to feel nauseated and/or vomit after receiving anesthesia.  If you develop these symptoms, drink clear liquids (apple juice, ginger ale, broth, 7-up, etc. ) until you feel better.  If your nausea and vomiting persists for 24 hours, please notify your surgeon.        All narcotic pain medications, along with inactivity and anesthesia, can cause constipation. Drinking plenty of liquids and increasing fiber intake will help.      For any questions of a medical nature, call your surgeon.      Do not make important decisions for 24 hours.      If you had general anesthesia, you may have a sore throat for a couple of days related to the breathing tube used during surgery.  You may use Cepacol lozenges to help with this discomfort.  If it worsens or if you develop a fever, contact your surgeon.       If you feel your pain is not well managed with the pain medications prescribed by your surgeon, please contact your surgeon's office to let them know so they can address your concerns.       Cystoscopy Discharge Instructions      Diet:    Return to the diet that you were on before the procedure, unless you are given specific diet instructions.    It is important to drink 6-8 glasses of fluids per day at home - at least 3-4 glasses should be water.    Activity:    Walk short distances and increase as your strength allows.    You may climb stairs.    Do not do strenuous exercise or heavy lifting until approved by surgeon.    Do not drive while taking narcotic pain medications.    Bathing:    You may take a shower.    Call your physician if these signs/symptoms are present:    Pain that is not  relieved by a short rest or ordered pain medications.    Temperature at or above 101.0 F or chills.        Excessive blood in urine.    Any questions or concerns.        Dr. Ibrahim's office will email work release note to patient.  (per office)    **If you have questions or concerns about your procedure,   call Dr. Ibrahim at [phone]**    Pending Results     Date and Time Order Name Status Description    7/10/2018 1241 XR Surgery ROSENDO Fluoro L/T 5 Min w Stills In process             Admission Information     Date & Time Provider Department Dept. Phone    7/10/2018 Mario Ibrahim MD [facility] PACU [phone]      Your Vitals Were     Blood Pressure Pulse Temperature Respirations Height Weight    124/77 48 97  F (36.1  C) (Temporal) 16 1.829 m (6') 106.1 kg (234 lb)    Pulse Oximetry BMI (Body Mass Index)                96% 31.74 kg/m2          Care EveryWhere ID     This is your Care EveryWhere ID. This could be used by other organizations to access your [facility] medical records  [id number]        Equal Access to Services     [facility]: Hadii daniel velásquez Soearle, waaxda luqadaha, qaybta kaalmadanielle christensen, micaela patel . So [facility] [phone].    ATENCIÓN: Si habla español, tiene a recio disposición servicios gratuitos de asistencia lingüística. [facility] [phone].    We comply with applicable federal civil rights laws and [place] laws. We do not discriminate on the basis of race, color, national origin, age, disability, sex, sexual orientation, or gender identity.               Review of your medicines      CONTINUE these medicines which have NOT CHANGED        Dose / Directions    carBAMazepine 200 MG 12 hr tablet   Commonly known as:  TEGretol XR        Dose:  800 mg   Take 4 tablets (800 mg) by mouth 2 times daily Take 1 tab po pm ( together with 400 mg tab )   Quantity:  1 tablet   Refills:  0       lamoTRIgine 100 MG tablet   Commonly known as:  LaMICtal         1.5 tabs twice a day   Quantity:  1 tablet   Refills:  0       levETIRAcetam 1000 MG Tabs        2.5 tabs twice a day   Quantity:  1 tablet   Refills:  0                Protect others around you: Learn how to safely use, store and throw away your medicines at www.disposemymeds.org.             Medication List: This is a list of all your medications and when to take them. Check marks below indicate your daily home schedule. Keep this list as a reference.      Medications           Morning Afternoon Evening Bedtime As Needed    carBAMazepine 200 MG 12 hr tablet   Commonly known as:  TEGretol XR   Take 4 tablets (800 mg) by mouth 2 times daily Take 1 tab po pm ( together with 400 mg tab )                                lamoTRIgine 100 MG tablet   Commonly known as:  LaMICtal   1.5 tabs twice a day                                levETIRAcetam 1000 MG Tabs   2.5 tabs twice a day

## 2018-07-10 NOTE — ANESTHESIA CARE TRANSFER NOTE
Patient: Olu Trinh    Procedure(s):  CYSTOSCOPY  OF NEOBLADDER   - Wound Class: II-Clean Contaminated   - Wound Class: II-Clean Contaminated    Diagnosis: HISTORY OF URINARY TRACT INFECTION ; URETERAL STONE   Diagnosis Additional Information: No value filed.    Anesthesia Type:   LMA, General     Note:  Airway :Face Mask  Patient transferred to:PACU  Comments: Answers questionsHandoff Report: Identifed the Patient, Identified the Reponsible Provider, Reviewed the pertinent medical history, Discussed the surgical course, Reviewed Intra-OP anesthesia mangement and issues during anesthesia, Set expectations for post-procedure period and Allowed opportunity for questions and acknowledgement of understanding      Vitals: (Last set prior to Anesthesia Care Transfer)    CRNA VITALS  7/10/2018 1214 - 7/10/2018 1244      7/10/2018             Resp Rate (set): 10                Electronically Signed By: PATRICIA Norton CRNA  July 10, 2018  12:44 PM

## 2018-07-10 NOTE — OP NOTE
Procedure Date: 07/10/2018      PREOPERATIVE DIAGNOSIS:     1.  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   2.  Recurrent urinary tract infections.      POSTOPERATIVE DIAGNOSIS:  Significant mucus within the reservoir.      PROCEDURE PERFORMED:  Cystoscopy and evacuation of mucous.      PREOPERATIVE STATUS:  Pavel Trinh is a 33-year-old male who has urinary diversion with the Mitrofanoff.  He has been getting recurrent urinary tract infections.  CT scan showed a possible 6-7 mm stone in the Mitrofanoff pouch.  He now comes in for cystoscopy and possible removal of the stone.      DESCRIPTION OF PROCEDURE:  The patient was identified and brought to the operating room.  After adequate general anesthesia, he was placed in supine position and prepped and draped in the usual sterile fashion.  I then performed flexible cystoscopy through the ostomy.  I was able to negotiate the 16-Telugu flexible cystoscope easily into the pouch.  Inspection showed that the patient has a significant amount of mucus within the pouch.  I first evacuated all this mucus by aspirating through the cystoscope.  Careful inspection then showed no further mucus.  I could not see any stones.  I emptied the pouch a couple of times and filled it again and inspected thoroughly, but did not see any stone.  The pouch itself looks good.  At this point, the pouch was emptied and the cystoscope was withdrawn.  Again, there was no stone that was present within the pouch.  It is possible that some of the mucous balls projected as a stone.  The patient tolerated the procedure well and was sent to the recovery room in stable condition.         SHEILA IBRAHIM MD             D: 07/10/2018   T: 07/10/2018   MT: MICHELLE      Name:     PAVEL TIRNH   MRN:      [id number]        Account:        [id number]   :      1985           Procedure Date: 07/10/2018      Document: [id number]       cc: Sheila Ibrahim MD

## 2018-07-10 NOTE — DISCHARGE INSTRUCTIONS
Same Day Surgery Discharge Instructions for  Sedation and General Anesthesia       It's not unusual to feel dizzy, light-headed or faint for up to 24 hours after surgery or while taking pain medication.  If you have these symptoms: sit for a few minutes before standing and have someone assist you when you get up to walk or use the bathroom.      You should rest and relax for the next 24 hours. We recommend you make arrangements to have an adult stay with you for at least 24 hours after your discharge.  Avoid hazardous and strenuous activity.      DO NOT DRIVE any vehicle or operate mechanical equipment for 24 hours following the end of your surgery.  Even though you may feel normal, your reactions may be affected by the medication you have received.      Do not drink alcoholic beverages for 24 hours following surgery.       Slowly progress to your regular diet as you feel able. It's not unusual to feel nauseated and/or vomit after receiving anesthesia.  If you develop these symptoms, drink clear liquids (apple juice, ginger ale, broth, 7-up, etc. ) until you feel better.  If your nausea and vomiting persists for 24 hours, please notify your surgeon.        All narcotic pain medications, along with inactivity and anesthesia, can cause constipation. Drinking plenty of liquids and increasing fiber intake will help.      For any questions of a medical nature, call your surgeon.      Do not make important decisions for 24 hours.      If you had general anesthesia, you may have a sore throat for a couple of days related to the breathing tube used during surgery.  You may use Cepacol lozenges to help with this discomfort.  If it worsens or if you develop a fever, contact your surgeon.       If you feel your pain is not well managed with the pain medications prescribed by your surgeon, please contact your surgeon's office to let them know so they can address your concerns.       Cystoscopy Discharge  Instructions      Diet:    Return to the diet that you were on before the procedure, unless you are given specific diet instructions.    It is important to drink 6-8 glasses of fluids per day at home - at least 3-4 glasses should be water.    Activity:    Walk short distances and increase as your strength allows.    You may climb stairs.    Do not do strenuous exercise or heavy lifting until approved by surgeon.    Do not drive while taking narcotic pain medications.    Bathing:    You may take a shower.    Call your physician if these signs/symptoms are present:    Pain that is not relieved by a short rest or ordered pain medications.    Temperature at or above 101.0 F or chills.        Excessive blood in urine.    Any questions or concerns.        Dr. Ibrahim's office will email work release note to patient.  (per office)    **If you have questions or concerns about your procedure,   call Dr. Ibrahim at [phone]**

## 2018-07-10 NOTE — ANESTHESIA PREPROCEDURE EVALUATION
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 Authorizing Provider   carBAMazepine (TEGRETOL XR) 200 MG 12 hr tablet Take 4 tablets (800 mg) by mouth 2 times daily Take 1 tab po pm ( together with 400 mg tab ) 5/11/18   Renetta Krause APRN CNP   lamoTRIgine (LAMICTAL) 100 MG tablet 1.5 tabs twice a day 5/11/18   Renetta Krause APRN CNP   levETIRAcetam 1000 MG TABS 2.5 tabs twice a day 5/11/18   Renetta Krause APRN CNP     Current Facility-Administered Medications Ordered in Epic   Medication Dose Route Frequency Last Rate Last Dose     ceFAZolin (ANCEF) intermittent infusion 2 g in 100 mL dextrose PRE-MIX  2 g Intravenous Pre-Op/Pre-procedure x 1 dose         No current [facility]-ordered outpatient prescriptions on file.     Wt Readings from Last 1 Encounters:   06/25/18 104.8 kg (231 lb)     Temp Readings from Last 1 Encounters:   06/25/18 36.2  C (97.2  F) (Tympanic)     BP Readings from Last 6 Encounters:   06/25/18 123/75   05/11/18 134/82   11/16/15  128/81   10/05/15 148/75   08/10/15 (!) 141/97   07/24/15 109/68     Pulse Readings from Last 4 Encounters:   06/25/18 57   05/11/18 55   11/16/15 60   10/05/15 83     Resp Readings from Last 1 Encounters:   06/25/18 23     SpO2 Readings from Last 1 Encounters:   06/25/18 96%     Recent Labs   Lab Test  05/11/18   1143  06/01/15   1007   NA  142  140   POTASSIUM  4.3  3.9   CHLORIDE  107  106   CO2  31  27   ANIONGAP  4  7   GLC  90  85   BUN  18  12   CR  0.81  0.87   MIO  9.6  9.0       Anesthesia Evaluation     .             ROS/MED HX    ENT/Pulmonary:      (-) tobacco use, asthma, COPD and sleep apnea   Neurologic:     (+)seizures last seizure: 6/2018    (-) CVA, TIA and migraines   Cardiovascular:        (-) hypertension, CAD, BELL, arrhythmias, valvular problems/murmurs and dyslipidemia   METS/Exercise Tolerance:  >4 METS   Hematologic:        (-) history of blood clots, anemia and other hematologic disorder   Musculoskeletal:        (-) arthritis   GI/Hepatic:        (-) GERD and liver disease   Renal/Genitourinary:     (+) chronic renal disease (ureteral stones), Nephrolithiasis , Other Renal/ Genitourinary, neurogenic bladder, requires straight cath      Endo:      (-) Type I DM, Type II DM, thyroid disease and obesity   Psychiatric:     (+) psychiatric history Psychiatric Hx List: ADHD.      Infectious Disease:        (-) Recent Fever   Malignancy:         Other:                     Physical Exam  Normal systems: cardiovascular and dental    Airway   Mallampati: II  TM distance: >3 FB  Neck ROM: full    Dental     Cardiovascular   Rhythm and rate: regular and normal  (-) no murmur    Pulmonary    breath sounds clear to auscultation                    Anesthesia Plan      History & Physical Review      ASA Status:  2 .    NPO Status:  > 8 hours    Plan for LMA and General with Propofol induction. Maintenance will be Balanced.    PONV prophylaxis:  Ondansetron (or other 5HT-3)       Postoperative  Care  Postoperative pain management:  Multi-modal analgesia.      Consents  Anesthetic plan, risks, benefits and alternatives discussed with:  Patient..                          .

## 2018-07-10 NOTE — ANESTHESIA POSTPROCEDURE EVALUATION
Patient: Olu Trinh    Procedure(s):  CYSTOSCOPY  OF NEOBLADDER   - Wound Class: II-Clean Contaminated   - Wound Class: II-Clean Contaminated    Diagnosis:HISTORY OF URINARY TRACT INFECTION ; URETERAL STONE   Diagnosis Additional Information: No value filed.    Anesthesia Type:  LMA, General    Note:  Anesthesia Post Evaluation    Patient location during evaluation: PACU  Patient participation: Able to fully participate in evaluation  Level of consciousness: awake and alert  Pain management: adequate  Airway patency: patent  Cardiovascular status: acceptable  Respiratory status: acceptable  Hydration status: acceptable  PONV: none     Anesthetic complications: None          Last vitals:  Vitals:    07/10/18 1315 07/10/18 1330 07/10/18 1345   BP: 126/65 123/87 124/77   Pulse:      Resp: 16 15 16   Temp:      SpO2: 98% 99% 96%         Electronically Signed By: Nohemy Reyes MD  July 10, 2018  1:57 PM

## 2018-07-10 NOTE — PROCEDURES
[facility] Urology Brief Operative Note    Pre-operative diagnosis: HISTORY OF URINARY TRACT INFECTION   Stone in urinary reservoir ( Mitrofanoff)   Post-operative diagnosis: Same   Procedure: Procedure(s):  CYSTOSCOPY  OF Mitrofanoff   - Wound Class: II-Clean Contaminated   - Wound Class: II-Clean Contaminated    Surgeon: Mario Ibrahim MD   Assistant(s): None   Anesthesia: General endotracheal anesthesia   Estimated blood loss: None   Total IV fluids: (See anesthesia record)   Blood transfusion: No transfusion was given during surgery   Total urine output: (See anesthesia record)   Drains: None   Specimens: None   Implants: None   Findings: Mucus in Mitrofanoff, No stone   Complications: None   Condition: Stable   Comments: See dictated operative report for full details

## 2018-09-19 ENCOUNTER — OFFICE VISIT (OUTPATIENT)
Dept: FAMILY MEDICINE | Facility: CLINIC | Age: 33
End: 2018-09-19
Payer: COMMERCIAL

## 2018-09-19 VITALS
OXYGEN SATURATION: 100 % | HEART RATE: 62 BPM | DIASTOLIC BLOOD PRESSURE: 84 MMHG | WEIGHT: 239.75 LBS | BODY MASS INDEX: 32.47 KG/M2 | SYSTOLIC BLOOD PRESSURE: 129 MMHG | TEMPERATURE: 96.2 F | RESPIRATION RATE: 16 BRPM | HEIGHT: 72 IN

## 2018-09-19 DIAGNOSIS — G40.919 INTRACTABLE EPILEPSY WITHOUT STATUS EPILEPTICUS, UNSPECIFIED EPILEPSY TYPE (H): ICD-10-CM

## 2018-09-19 DIAGNOSIS — Z01.818 PREOP GENERAL PHYSICAL EXAM: Primary | ICD-10-CM

## 2018-09-19 LAB
ERYTHROCYTE [DISTWIDTH] IN BLOOD BY AUTOMATED COUNT: 12.9 % (ref 10–15)
HCT VFR BLD AUTO: 47.2 % (ref 40–53)
HGB BLD-MCNC: 15.5 G/DL (ref 13.3–17.7)
MCH RBC QN AUTO: 30.1 PG (ref 26.5–33)
MCHC RBC AUTO-ENTMCNC: 32.8 G/DL (ref 31.5–36.5)
MCV RBC AUTO: 92 FL (ref 78–100)
PLATELET # BLD AUTO: 122 10E9/L (ref 150–450)
RBC # BLD AUTO: 5.15 10E12/L (ref 4.4–5.9)
WBC # BLD AUTO: 6 10E9/L (ref 4–11)

## 2018-09-19 PROCEDURE — 80053 COMPREHEN METABOLIC PANEL: CPT | Performed by: FAMILY MEDICINE

## 2018-09-19 PROCEDURE — 85027 COMPLETE CBC AUTOMATED: CPT | Performed by: FAMILY MEDICINE

## 2018-09-19 PROCEDURE — 99214 OFFICE O/P EST MOD 30 MIN: CPT | Performed by: FAMILY MEDICINE

## 2018-09-19 PROCEDURE — 36415 COLL VENOUS BLD VENIPUNCTURE: CPT | Performed by: FAMILY MEDICINE

## 2018-09-19 NOTE — MR AVS SNAPSHOT
After Visit Summary   9/19/2018    Olu Trinh    MRN: [id number]           Patient Information     Date Of Birth          1985        Visit Information        Provider Department      9/19/2018 4:20 PM Venkat Michaels MD [facility]        Today's Diagnoses     Preop general physical exam    -  1    Intractable epilepsy without status epilepticus, unspecified epilepsy type (H)          Care Instructions      Before Your Surgery      Call your surgeon if there is any change in your health. This includes signs of a cold or flu (such as a sore throat, runny nose, cough, rash or fever).    Do not smoke, drink alcohol or take over the counter medicine (unless your surgeon or primary care doctor tells you to) for the 24 hours before and after surgery.    If you take prescribed drugs: Follow your doctor s orders about which medicines to take and which to stop until after surgery.    Eating and drinking prior to surgery: follow the instructions from your surgeon    Take a shower or bath the night before surgery. Use the soap your surgeon gave you to gently clean your skin. If you do not have soap from your surgeon, use your regular soap. Do not shave or scrub the surgery site.  Wear clean pajamas and have clean sheets on your bed.           Follow-ups after your visit        Who to contact     If you have questions or need follow up information about today's clinic visit or your schedule please contact [facility] directly at [phone].  Normal or non-critical lab and imaging results will be communicated to you by MyChart, letter or phone within 4 business days after the clinic has received the results. If you do not hear from us within 7 days, please contact the clinic through [employer] or phone. If you have a critical or abnormal lab result, we will notify you by phone as soon as possible.  Submit refill requests through [employer] or call your pharmacy  and they will forward the refill request to us. Please allow 3 business days for your refill to be completed.          Additional Information About Your Visit        Care EveryWhere ID     This is your Care EveryWhere ID. This could be used by other organizations to access your [facility] medical records  [id number]        Your Vitals Were     Pulse Temperature Respirations Height Pulse Oximetry BMI (Body Mass Index)    62 96.2  F (35.7  C) (Tympanic) 16 6' (1.829 m) 100% 32.52 kg/m2       Blood Pressure from Last 3 Encounters:   09/19/18 129/84   07/10/18 133/87   06/25/18 123/75    Weight from Last 3 Encounters:   09/19/18 239 lb 12 oz (108.7 kg)   07/10/18 234 lb (106.1 kg)   06/25/18 231 lb (104.8 kg)              We Performed the Following     CBC with platelets     Comprehensive metabolic panel        Primary Care Provider Office Phone # Fax #    Andrew Walton [phone] [address] [facility] [phone]        Equal Access to Services     [facility]: Hadii daniel jamisono Soearle, waaxda luqadaha, qaybta kaalmada fermin, micaela patel . So [facility] [phone].    ATENCIÓN: Si habla español, tiene a recio disposición servicios gratuitos de asistencia lingüística. [facility] [phone].    We comply with applicable federal civil rights laws and [place] laws. We do not discriminate on the basis of race, color, national origin, age, disability, sex, sexual orientation, or gender identity.            Thank you!     Thank you for choosing [facility]  for your care. Our goal is always to provide you with excellent care. Hearing back from our patients is one way we can continue to improve our services. Please take a few minutes to complete the written survey that you may receive in the mail after your visit with us. Thank you!             Your Updated Medication List - Protect others around you: Learn how to safely use, store and  throw away your medicines at www.disposemymeds.org.          This list is accurate as of 9/19/18 11:59 PM.  Always use your most recent med list.                   Brand Name Dispense Instructions for use Diagnosis    carBAMazepine 200 MG 12 hr tablet    TEGretol XR    1 tablet    Take 4 tablets (800 mg) by mouth 2 times daily Take 1 tab po pm ( together with 400 mg tab )        lamoTRIgine 100 MG tablet    LaMICtal    1 tablet    200 mg 1.5 tabs twice a day        levETIRAcetam 1000 MG Tabs     1 tablet    2.5 tabs twice a day

## 2018-09-19 NOTE — PROGRESS NOTES
[place] [id number] [place] [address] [id number] [phone]  Dept: [phone]    PRE-OP EVALUATION:   Today's date: 2018    Olu Trinh (: 1985) presents for pre-operative evaluation assessment as requested by Dr. Berger.  He requires evaluation and anesthesia risk assessment prior to undergoing surgery/procedure for treatment of front left lobectomy   .    Fax number for surgical facility:   Primary Physician: Andrew Walton  Type of Anesthesia Anticipated: General    Patient has a Health Care Directive or Living Will:  YES will bring in copy     Preop Questions 2018   Who is doing your surgery? Dr Alistair Patel   What are you having done? Front Left Laboctomy   Date of Surgery/Procedure: 10-1   Facility or Hospital where procedure/surgery will be performed: [facility]   1.  Do you have a history of Heart attack, stroke, stent, coronary bypass surgery, or other heart surgery? No   2.  Do you ever have any pain or discomfort in your chest? No   3.  Do you have a history of  Heart Failure? No   4.   Are you troubled by shortness of breath when:  walking on a level surface, or up a slight hill, or at night? No   5.  Do you currently have a cold, bronchitis or other respiratory infection? No   6.  Do you have a cough, shortness of breath, or wheezing? No   7.  Do you sometimes get pains in the calves of your legs when you walk? No   8. Do you or anyone in your family have previous history of blood clots? No   9.  Do you or does anyone in your family have a serious bleeding problem such as prolonged bleeding following surgeries or cuts? No   10. Have you ever had problems with anemia or been told to take iron pills? No   11. Have you had any abnormal blood loss such as black, tarry or bloody stools? No   12. Have you ever had a blood transfusion? No   13. Have you or any of your relatives ever had problems with anesthesia? No   14. Do you have sleep  apnea, excessive snoring or daytime drowsiness? No   15. Do you have any prosthetic heart valves? No   16. Do you have prosthetic joints? No     HPI:     HPI related to upcoming procedure: has recurrent seizure episodes. Yesterday was last episode. Besides that no seizure for couple of months.   Due to frequency of episodes and did multiple medications without benefit, he is going for surgery.   Does not usually take pain killer.     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 He is not wheel chair bound. He uses 14F cath for CIC.        Other hydronephrosis 06/02/2015     Priority: Medium     Bladder stone 06/02/2015     Priority: Medium     Neurogenic bladder 10/30/2012     Priority: Medium     Problem list name updated by automated process. Provider to review       Attention deficit hyperactivity disorder (ADHD) 11/06/2006     Priority: Medium     Problem list name updated by automated process. Provider to review        Past Medical History:   Diagnosis Date     Attention deficit disorder with hyperactivity(314.01)      Bladder stone 6/2/2015     Exstrophy of bladder sequence 6/29/2015     Learning disability      Neurogenic bladder, NOS 10/30/2012     Other hydronephrosis 6/2/2015     Unspecified epilepsy with intractable epilepsy 10/22/2013     Past Surgical History:   Procedure Laterality Date     BLADDER AUGMENTATION       bladder neck reconstruction      Mikey Glez     C REMOVAL OF KIDNEY STONE       CYSTOSCOPY N/A 7/10/2018    Procedure: CYSTOSCOPY;  CYSTOSCOPY  OF NEOBLADDER  ;  Surgeon: Mario Ibrahim MD;  Location: [facility] OR     LASER HOLMIUM LITHOTRIPSY BLADDER  N/A 7/24/2015    Procedure: LASER HOLMIUM LITHOTRIPSY BLADDER;  Surgeon: Joel Blunt MD;  [facility]     LASER HOLMIUM LITHOTRIPSY BLADDER N/A 7/10/2018    Procedure: LASER HOLMIUM LITHOTRIPSY BLADDER;;  Surgeon: Mario Ibrahim MD;  Location: [facility] OR     MITROFANOFF PROCEDURE (APPENDIX CONDUIT)  2002     Current Outpatient Prescriptions   Medication Sig Dispense Refill     carBAMazepine (TEGRETOL XR) 200 MG 12 hr tablet Take 4 tablets (800 mg) by mouth 2 times daily Take 1 tab po pm ( together with 400 mg tab ) 1 tablet 0     lamoTRIgine (LAMICTAL) 100 MG tablet 1.5 tabs twice a day 1 tablet 0     levETIRAcetam 1000 MG TABS 2.5 tabs twice a day 1 tablet 0     OTC products: None, except as noted above    Allergies   Allergen Reactions     Sulfa Drugs      unknown      Latex Allergy: NO    Social History   Substance Use Topics     Smoking status: Never Smoker     Smokeless tobacco: Never Used     Alcohol use Yes      Comment: Alcoholic beverage once or twice per week.     History   Drug Use Not on file     REVIEW OF SYSTEMS:   Constitutional, neuro, ENT, endocrine, pulmonary, cardiac, gastrointestinal, genitourinary, musculoskeletal, integument and psychiatric systems are negative, except as otherwise noted.    EXAM:   /84 (BP Location: Left arm, Patient Position: Sitting, Cuff Size: Adult Large)  Pulse 62  Temp 96.2  F (35.7  C) (Tympanic)  Resp 16  Ht 6' (1.829 m)  Wt 239 lb 12 oz (108.7 kg)  SpO2 100%  BMI 32.52 kg/m2    GENERAL APPEARANCE: healthy, alert and no distress     EYES: EOMI,  PERRL     HENT: ear canals and TM's normal and nose and mouth without ulcers or lesions     NECK: no adenopathy, no asymmetry, masses, or scars and thyroid normal to palpation     RESP: lungs clear to auscultation - no rales, rhonchi or wheezes     CV: regular rates and rhythm, normal S1 S2, no S3 or S4 and no murmur, click or rub     MS: extremities normal- no gross deformities noted, no evidence of  inflammation in joints, FROM in all extremities.     SKIN: no suspicious lesions or rashes on visible parts      NEURO: Normal strength and tone, sensory exam grossly normal, mentation intact and speech normal     PSYCH: mentation appears normal. Blunt affects     DIAGNOSTICS:     Results for orders placed or performed in visit on 09/19/18   CBC with platelets   Result Value Ref Range    WBC 6.0 4.0 - 11.0 10e9/L    RBC Count 5.15 4.4 - 5.9 10e12/L    Hemoglobin 15.5 13.3 - 17.7 g/dL    Hematocrit 47.2 40.0 - 53.0 %    MCV 92 78 - 100 fl    MCH 30.1 26.5 - 33.0 pg    MCHC 32.8 31.5 - 36.5 g/dL    RDW 12.9 10.0 - 15.0 %    Platelet Count 122 (L) 150 - 450 10e9/L         Recent Labs   Lab Test  05/11/18   1143  06/01/15   1007  08/08/13   1415   HGB   --   15.2  15.4   PLT   --   184  211   NA  142  140  144   POTASSIUM  4.3  3.9  4.5   CR  0.81  0.87  0.93        IMPRESSION:   Reason for surgery/procedure: intractable epilepsy  Diagnosis/reason for consult: preop    The proposed surgical procedure is considered INTERMEDIATE risk.    REVISED CARDIAC RISK INDEX  The patient has the following serious cardiovascular risks for perioperative complications such as (MI, PE, VFib and 3  AV Block):  No serious cardiac risks  INTERPRETATION: 0 risks: Class I (very low risk - 0.4% complication rate)    The patient has the following additional risks for perioperative complications:  No identified additional risks      ICD-10-CM    1. Preop general physical exam Z01.818        RECOMMENDATIONS:     --Consult hospital rounder / IM to assist post-op medical management for medical management.     --Patient is to take all scheduled medications on the day of surgery EXCEPT for modifications listed below.  Avoid nsaids.    APPROVAL GIVEN to proceed with proposed procedure, without further diagnostic evaluation       Signed Electronically by: Venkat Michaels MD, MD    Copy of this evaluation report is provided to requesting  physician.    [place] Preop Guidelines    Revised Cardiac Risk Index

## 2018-09-21 LAB
ALBUMIN SERPL-MCNC: 4 G/DL (ref 3.4–5)
ALP SERPL-CCNC: 50 U/L (ref 40–150)
ALT SERPL W P-5'-P-CCNC: 31 U/L (ref 0–70)
ANION GAP SERPL CALCULATED.3IONS-SCNC: 10 MMOL/L (ref 3–14)
AST SERPL W P-5'-P-CCNC: 20 U/L (ref 0–45)
BILIRUB SERPL-MCNC: 0.3 MG/DL (ref 0.2–1.3)
BUN SERPL-MCNC: 19 MG/DL (ref 7–30)
CALCIUM SERPL-MCNC: 9.6 MG/DL (ref 8.5–10.1)
CHLORIDE SERPL-SCNC: 107 MMOL/L (ref 94–109)
CO2 SERPL-SCNC: 24 MMOL/L (ref 20–32)
CREAT SERPL-MCNC: 1.03 MG/DL (ref 0.66–1.25)
GFR SERPL CREATININE-BSD FRML MDRD: 83 ML/MIN/1.7M2
GLUCOSE SERPL-MCNC: 81 MG/DL (ref 70–99)
POTASSIUM SERPL-SCNC: 5 MMOL/L (ref 3.4–5.3)
PROT SERPL-MCNC: 7.7 G/DL (ref 6.8–8.8)
SODIUM SERPL-SCNC: 141 MMOL/L (ref 133–144)

## 2018-10-01 ENCOUNTER — TRANSFERRED RECORDS (OUTPATIENT)
Dept: HEALTH INFORMATION MANAGEMENT | Facility: CLINIC | Age: 33
End: 2018-10-01

## 2019-08-07 ENCOUNTER — TRANSFERRED RECORDS (OUTPATIENT)
Dept: HEALTH INFORMATION MANAGEMENT | Facility: CLINIC | Age: 34
End: 2019-08-07

## 2020-04-28 ENCOUNTER — TELEPHONE (OUTPATIENT)
Dept: UROLOGY | Facility: CLINIC | Age: 35
End: 2020-04-28

## 2020-04-28 ENCOUNTER — PRE VISIT (OUTPATIENT)
Dept: UROLOGY | Facility: CLINIC | Age: 35
End: 2020-04-28

## 2020-04-28 DIAGNOSIS — N31.9 NEUROGENIC BLADDER: Primary | ICD-10-CM

## 2020-04-28 NOTE — TELEPHONE ENCOUNTER
[facility] Call Center    Phone Message    May a detailed message be left on voicemail: yes     Reason for Call: Other: Patient called said he just got out of the hospital and need to see Jose Raul, please call to see if he can be seen in clinic for UTI follow-up     Action Taken: Other: ump UROLOGY    Travel Screening: Not Applicable

## 2020-04-28 NOTE — TELEPHONE ENCOUNTER
"Pt called and scheduled to see Dr. Blunt. He states he had a seizure that was \"triggered\" by a UTI. Pt last seen in 2015.     Pt will call back to schedule labs and imaging.  "

## 2020-04-28 NOTE — TELEPHONE ENCOUNTER
Reason for visit: Neurogenic bladder follow up      Relevant information: pt last seen 2015, pt states he recently had a seizure due to a UTI, Marta    Records/imaging/labs/orders: orders placed for imaging     Pt called: yes    At Rooming: regular

## 2020-04-29 NOTE — TELEPHONE ENCOUNTER
MEDICAL RECORDS REQUEST   [facility]  Urology Clinic  [address] [place] [address]  PHONE: [phone]  Fax: [phone]        FUTURE VISIT INFORMATION                                                   Olu Trinh : 1985 scheduled for future visit at [facility]    APPOINTMENT INFORMATION:    Date: 20 1PM    Provider:  Joel Blunt MD    Reason for Visit/Diagnosis: NGB    REFERRAL INFORMATION:    Referring provider:  Self    Specialty: N/A    Referring providers clinic:  N/A    Clinic contact number:  N/A    RECORDS REQUESTED FOR VISIT                                                     NOTES  STATUS/DETAILS   OFFICE NOTE from referring provider  no   OFFICE NOTE from other specialist  yes   DISCHARGE SUMMARY from hospital  yes   DISCHARGE REPORT from the ER  yes   OPERATIVE REPORT  yes   MEDICATION LIST  yes   NEUROGENIC BLADDER      CT ABDOMEN/PELVIS (IMAGES & REPORT)  Yes- 19   CYSTOGRAM (IMAGES & REPORT)  yes   IVP (IMAGES & REPORT)  no   KUB (IMAGES & REPORT)  no   LABS  yes   RENAL/BLADDER ULTRASOUND (IMAGES & REPORT)  yes     PRE-VISIT CHECKLIST      Record collection complete Yes- Richard recs in CE   Previous recs from  Dr. Trivedi   Fax to Richard to push images to [facility] PACS  2:14PM      Appointment appropriately scheduled           (right time/right provider) Yes   MyChart activation If no, please explain: In process    Questionnaire complete If no, please explain: In process      Completed by: Katarina Fregoso

## 2020-04-30 ENCOUNTER — TELEPHONE (OUTPATIENT)
Dept: UROLOGY | Facility: CLINIC | Age: 35
End: 2020-04-30

## 2020-04-30 NOTE — TELEPHONE ENCOUNTER
----- Message from Ester Robbins CMA sent at 4/30/2020  8:05 AM CDT -----  Regarding: Urgent - imaging  Da needs a renal US before his appointment with Dr. Blunt. I gave him the number, but it is not scheduled. Please call pt and help him schedule his imaging.    Thank you,  Ester

## 2020-04-30 NOTE — TELEPHONE ENCOUNTER
Left message for pt to call and schedule provider requested ultrasound prior to his appt with Dr. Merino on 5/11.

## 2020-05-05 ENCOUNTER — ANCILLARY PROCEDURE (OUTPATIENT)
Dept: ULTRASOUND IMAGING | Facility: CLINIC | Age: 35
End: 2020-05-05
Attending: UROLOGY
Payer: COMMERCIAL

## 2020-05-05 DIAGNOSIS — N31.9 NEUROGENIC BLADDER: ICD-10-CM

## 2020-05-08 ENCOUNTER — TELEPHONE (OUTPATIENT)
Dept: UROLOGY | Facility: CLINIC | Age: 35
End: 2020-05-08

## 2020-05-08 NOTE — TELEPHONE ENCOUNTER
Detailed message left asking pt to contact clinic to schedule labs and imaging (renal US). Pt further instructed to reschedule appointment until after he has labs and imaging done. We have left several messages requesting, and pt and I discussed the importance of this when I initially spoke with him for his pre visit.

## 2020-05-11 ENCOUNTER — PRE VISIT (OUTPATIENT)
Dept: UROLOGY | Facility: CLINIC | Age: 35
End: 2020-05-11

## 2020-05-11 ENCOUNTER — OFFICE VISIT (OUTPATIENT)
Dept: UROLOGY | Facility: CLINIC | Age: 35
End: 2020-05-11
Payer: COMMERCIAL

## 2020-05-11 DIAGNOSIS — N31.9 NEUROGENIC BLADDER: Primary | ICD-10-CM

## 2020-05-11 DIAGNOSIS — N31.9 NEUROGENIC BLADDER: ICD-10-CM

## 2020-05-11 LAB
CREAT SERPL-MCNC: 1 MG/DL (ref 0.66–1.25)
GFR SERPL CREATININE-BSD FRML MDRD: >90 ML/MIN/{1.73_M2}

## 2020-05-11 ASSESSMENT — PAIN SCALES - GENERAL: PAINLEVEL: NO PAIN (0)

## 2020-05-11 NOTE — LETTER
5/11/2020       RE: Olu Trihn  [address]  [facility] [address]     Dear Colleague,    Thank you for referring your patient, Olu Trinh, to the [facility] UROLOGY AND [facility]ST FOR PROSTATE AND UROLOGIC CANCERS at [facility]. Please see a copy of my visit note below.    Follow-up Visit for Neurogenic Bladder    Name: Olu Trinh    MRN: [id number]   YOB: 1985  Accompanied at today's visit by:self                 Chief Complaint:   Neurogenic Bladder          History of Present Illness:   HISTORY: Olu Trinh is a 35 year old male with a history of neurogenic bladder secondary to Bladder / cloacal exstrophy.. Patient is not wheelchair bound. Last visit with us was  2015.   He has chronic bilateral hydronephrosis with worsening on today's ultrasound (see below). He has recurrent bladder stones. Had percutaneous bladder stone surgery by me in 2015 and Ibrahim in 2018.   Today's Ultrasound shows left severe and right mild but but UDS 2015 with us showed low pressure bladder up to volume 500cc.    Previous Bladder Surgeries:  Previous Bladder Augmentation: unknown bowel type in unknown year  Catheterizable stoma:appendiceal Mitrofanoff in unknown year.   Revisions include: none   Anti-incontinence procedures: none  Botox injections: None    Pertinent Medications:  Current Anticholinergics: None  Current Prophylactic antibiotics: None  Intravesical gentamycin:  None  Intravesical oxybutinin: None    Catheterization History:  The patient catheterizes per Mitrofanoff stoma into a augmented bladder with a 14F straight catheter q4-6 hours. Catheterization is performed by  self. The patient uses a new catheter each time. He does not irrigate the bladder. He catheterizes only when he feels full; sometimes he gets flank pain when he feels full.     Incontinence History:  He does not leak between voids/caths. He does not experience  urgency of urination.    If patient has an AUS or Sling then:  Leak management: No leak management necessary     Urinary Tract Infection History:  Treated with antibiotics for positive culture and non-specific symptoms: 0-2 times in the last year  year    Bowel Movement History:  The patient has a bowel movement q1 days. Bowel regimen includes none         Past Medical History:     Past Medical History:   Diagnosis Date     Attention deficit disorder with hyperactivity(314.01)      Bladder stone 6/2/2015     Exstrophy of bladder sequence 6/29/2015     Learning disability      Neurogenic bladder, NOS 10/30/2012     Other hydronephrosis 6/2/2015     Unspecified epilepsy with intractable epilepsy 10/22/2013            Past Surgical History:     Past Surgical History:   Procedure Laterality Date     BLADDER AUGMENTATION       bladder neck reconstruction      Mikey MONGE REMOVAL OF KIDNEY STONE       CYSTOSCOPY N/A 7/10/2018    Procedure: CYSTOSCOPY;  CYSTOSCOPY  OF NEOBLADDER  ;  Surgeon: Mario Ibrahim MD;  Location: [facility] OR     LASER HOLMIUM LITHOTRIPSY BLADDER N/A 7/24/2015    Procedure: LASER HOLMIUM LITHOTRIPSY BLADDER;  Surgeon: Joel Blunt MD;  [facility]     LASER HOLMIUM LITHOTRIPSY BLADDER N/A 7/10/2018    Procedure: LASER HOLMIUM LITHOTRIPSY BLADDER;;  Surgeon: Mario Ibrahim MD;  Location: [facility] OR     MITROFANOFF PROCEDURE (APPENDIX CONDUIT)  2002            Allergies:     Allergies   Allergen Reactions     Sulfa Drugs      unknown            Medications:     Current Outpatient Medications   Medication Sig     levETIRAcetam 1000 MG TABS Take 1,000 mg by mouth 1.5 tabs once a day     carBAMazepine (TEGRETOL XR) 200 MG 12 hr tablet Take 4 tablets (800 mg) by mouth 2 times daily Take 1 tab po pm ( together with 400 mg tab )     lamoTRIgine (LAMICTAL) 100 MG tablet 200 mg 1.5 tabs twice a day     No current facility-administered medications for this visit.      Social  History:  Drinks a lot of soda pop (4-5 cans a day)          Review of Systems:    ROS: 10 point ROS neg other than the symptoms noted above in the HPI and PMH.          Physical Exam:   B/P: Data Unavailable, T: Data Unavailable, P: Data Unavailable, R: Data Unavailable  Estimated body mass index is 32.52 kg/m  as calculated from the following:    Height as of 9/19/18: 1.829 m (6').    Weight as of 9/19/18: 108.7 kg (239 lb 12 oz).  General: age-appropriate appearing male in NAD sitting in an exam chair.    Back: bony spine is non-tender, flanks are non-tender. Surgical scars include none.  Abdomen: Degree of obesity is mild. Abdomen is soft and nontender. No organomegaly. Surgical scars include midline laparotomy. RLQ Mitrofanoff stoma which is very nice and pink.  Motor: Normal in both upper and lower limbs       Data:    Imaging:  Renal/Bladder Ultrasound (Date = 5/5/20):       Right hydronephrosis: moderate compared to mild 5 years ago       Left hydronephrosis: severe compared to moderate 5 years ago. This is despite the bladder being pretty empty on today's U/S        Kidney stones:None  Bladder:        Wall thickness: none        Bladder stones: None; no mucous either       Bladder mass: None    Labs:    Creatinine   Date Value Ref Range Status   05/11/2020 1.00 0.66 - 1.25 mg/dL Final      No B12 since last one in 2015 that was 412           Assessm ent and Plan:   35 year old male with neurogenic bladder secondary to bladder exstrophy. Current issues include:  1. Recurrent bladder stones  2. Worsening hydronephrosis  3. Only caths when he feels pressure  4. Drink 4-5 cans soda pop per day  5. Does not irrigate bladder  6. Has not come in for annual surveillance visit for 5 years     Plan:  1. Daily bladder irrigation with tap water (order for supplies given)  2. Stressed importance of regular catheterizing q3 hours given worsening hydro  3. Cut back on soda pop. Discussed link with stones and obesity  4.  RTC 1 year with renal U/S.     Joel Blunt MD  May 11, 2020

## 2020-05-11 NOTE — PROGRESS NOTES
Follow-up Visit for Neurogenic Bladder    Name: Olu Trinh    MRN: [id number]   YOB: 1985  Accompanied at today's visit by:self                 Chief Complaint:   Neurogenic Bladder          History of Present Illness:   HISTORY: Olu Trinh is a 35 year old male with a history of neurogenic bladder secondary to Bladder / cloacal exstrophy.. Patient is not wheelchair bound. Last visit with us was  2015.   He has chronic bilateral hydronephrosis with worsening on today's ultrasound (see below). He has recurrent bladder stones. Had percutaneous bladder stone surgery by me in 2015 and Ibrahim in 2018.   Today's Ultrasound shows left severe and right mild but but UDS 2015 with us showed low pressure bladder up to volume 500cc.    Previous Bladder Surgeries:  Previous Bladder Augmentation: unknown bowel type in unknown year  Catheterizable stoma:appendiceal Mitrofanoff in unknown year.   Revisions include: none   Anti-incontinence procedures: none  Botox injections: None    Pertinent Medications:  Current Anticholinergics: None  Current Prophylactic antibiotics: None  Intravesical gentamycin:  None  Intravesical oxybutinin: None    Catheterization History:  The patient catheterizes per Mitrofanoff stoma into a augmented bladder with a 14F straight catheter q4-6 hours. Catheterization is performed by  self. The patient uses a new catheter each time. He does not irrigate the bladder. He catheterizes only when he feels full; sometimes he gets flank pain when he feels full.     Incontinence History:  He does not leak between voids/caths. He does not experience urgency of urination.    If patient has an AUS or Sling then:  Leak management: No leak management necessary     Urinary Tract Infection History:  Treated with antibiotics for positive culture and non-specific symptoms: 0-2 times in the last year  year    Bowel Movement History:  The patient has a bowel movement q1 days. 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     Learning disability      Neurogenic bladder, NOS 10/30/2012     Other hydronephrosis 6/2/2015     Unspecified epilepsy with intractable epilepsy 10/22/2013            Past Surgical History:     Past Surgical History:   Procedure Laterality Date     BLADDER AUGMENTATION       bladder neck reconstruction      Mikey MONGE REMOVAL OF KIDNEY STONE       CYSTOSCOPY N/A 7/10/2018    Procedure: CYSTOSCOPY;  CYSTOSCOPY  OF NEOBLADDER  ;  Surgeon: Mario Ibrahim MD;  Location: [facility] OR     LASER HOLMIUM LITHOTRIPSY BLADDER N/A 7/24/2015    Procedure: LASER HOLMIUM LITHOTRIPSY BLADDER;  Surgeon: Joel Blunt MD;  Location: [facility]     LASER HOLMIUM LITHOTRIPSY BLADDER N/A 7/10/2018    Procedure: LASER HOLMIUM LITHOTRIPSY BLADDER;;  Surgeon: Mario Ibrahim MD;  Location: [facility] OR     MITROFANOFF PROCEDURE (APPENDIX CONDUIT)  2002            Allergies:     Allergies   Allergen Reactions     Sulfa Drugs      unknown            Medications:     Current Outpatient Medications   Medication Sig     levETIRAcetam 1000 MG TABS Take 1,000 mg by mouth 1.5 tabs once a day     carBAMazepine (TEGRETOL XR) 200 MG 12 hr tablet Take 4 tablets (800 mg) by mouth 2 times daily Take 1 tab po pm ( together with 400 mg tab )     lamoTRIgine (LAMICTAL) 100 MG tablet 200 mg 1.5 tabs twice a day     No current facility-administered medications for this visit.      Social History:  Drinks a lot of soda pop (4-5 cans a day)          Review of Systems:    ROS: 10 point ROS neg other than the symptoms noted above in the HPI and PMH.          Physical Exam:   B/P: Data Unavailable, T: Data Unavailable, P: Data Unavailable, R: Data Unavailable  Estimated body mass index is 32.52 kg/m  as calculated from the  following:    Height as of 9/19/18: 1.829 m (6').    Weight as of 9/19/18: 108.7 kg (239 lb 12 oz).  General: age-appropriate appearing male in NAD sitting in an exam chair.    Back: bony spine is non-tender, flanks are non-tender. Surgical scars include none.  Abdomen: Degree of obesity is mild. Abdomen is soft and nontender. No organomegaly. Surgical scars include midline laparotomy. RLQ Mitrofanoff stoma which is very nice and pink.  Motor: Normal in both upper and lower limbs       Data:    Imaging:  Renal/Bladder Ultrasound (Date = 5/5/20):       Right hydronephrosis: moderate compared to mild 5 years ago       Left hydronephrosis: severe compared to moderate 5 years ago. This is despite the bladder being pretty empty on today's U/S        Kidney stones:None  Bladder:        Wall thickness: none        Bladder stones: None; no mucous either       Bladder mass: None    Labs:    Creatinine   Date Value Ref Range Status   05/11/2020 1.00 0.66 - 1.25 mg/dL Final      No B12 since last one in 2015 that was 412           Assessm ent and Plan:   35 year old male with neurogenic bladder secondary to bladder exstrophy. Current issues include:  1. Recurrent bladder stones  2. Worsening hydronephrosis  3. Only caths when he feels pressure  4. Drink 4-5 cans soda pop per day  5. Does not irrigate bladder  6. Has not come in for annual surveillance visit for 5 years     Plan:  1. Daily bladder irrigation with tap water (order for supplies given)  2. Stressed importance of regular catheterizing q3 hours given worsening hydro  3. Cut back on soda pop. Discussed link with stones and obesity  4. RTC 1 year with renal U/S.     Joel Blunt MD  May 11, 2020

## 2020-05-11 NOTE — PROGRESS NOTES
[facility]    UROLOGICAL ORDER FORM      Patient Information:    Customer's Name: Olu Trinh  YOB: 1985  Address: [address] [place] [id number]  Phone #: [phone]  Diagnosis: Neurogenic Bladder    Product Needed:    Clean Intermittent Catheters 14Fr  QTY: 200 per month  Length of need: Indefinite    Product Needed:    50 mL Catheter Tip Syringe   QTY: 4 per month  Length of need: Indefinite    ORDERING Physician/PA/NP    Dr. Joel Blunt  DATE: 05/11/20      [facility]  [address] [place] [address] [id number]  [place], [place], [address]      FAX to [facility] [address], [address]  Phone: [phone]  Fax: [phone]  Email: [email or web address]

## 2020-05-11 NOTE — PATIENT INSTRUCTIONS
Please schedule labs, imaging and follow up in one year with Dr. Blunt.     It was a pleasure meeting with you today.  Thank you for allowing me and my team the privilege of caring for you today.  YOU are the reason we are here, and I truly hope we provided you with the excellent service you deserve.  Please let us know if there is anything else we can do for you so that we can be sure you are leaving completely satisfied with your care experience.      Manny Coto, EMT

## 2020-05-11 NOTE — NURSING NOTE
Chief Complaint   Patient presents with     Follow Up     NGB       There were no vitals taken for this visit. There is no height or weight on file to calculate BMI.    Patient Active Problem List   Diagnosis     Attention deficit hyperactivity disorder (ADHD)     Neurogenic bladder     Other hydronephrosis     Bladder stone     Exstrophy of bladder sequence     Intractable epilepsy without status epilepticus, unspecified epilepsy type (H)       Allergies   Allergen Reactions     Sulfa Drugs      unknown       Current Outpatient Medications   Medication Sig Dispense Refill     levETIRAcetam 1000 MG TABS Take 1,000 mg by mouth 1.5 tabs once a day 1 tablet 0     carBAMazepine (TEGRETOL XR) 200 MG 12 hr tablet Take 4 tablets (800 mg) by mouth 2 times daily Take 1 tab po pm ( together with 400 mg tab ) 1 tablet 0     lamoTRIgine (LAMICTAL) 100 MG tablet 200 mg 1.5 tabs twice a day 1 tablet 0       Social History     Tobacco Use     Smoking status: Never Smoker     Smokeless tobacco: Never Used   Substance Use Topics     Alcohol use: Yes     Comment: Alcoholic beverage once or twice per week.     Drug use: No       Manny Coto, EMT  5/11/2020  1:09 PM

## 2020-09-11 ENCOUNTER — TRANSFERRED RECORDS (OUTPATIENT)
Dept: HEALTH INFORMATION MANAGEMENT | Facility: CLINIC | Age: 35
End: 2020-09-11

## 2020-09-12 LAB
CREAT SERPL-MCNC: 0.93 MG/DL (ref 0.72–1.25)
GFR SERPL CREATININE-BSD FRML MDRD: >60 ML/MIN/1.73M2
POTASSIUM SERPL-SCNC: 4.3 MMOL/L (ref 3.5–5)

## 2020-11-30 ENCOUNTER — TELEPHONE (OUTPATIENT)
Dept: UROLOGY | Facility: CLINIC | Age: 35
End: 2020-11-30

## 2020-11-30 DIAGNOSIS — N31.9 NEUROGENIC BLADDER: Primary | ICD-10-CM

## 2020-11-30 NOTE — TELEPHONE ENCOUNTER
Patient states has had a seizure and usually it plays havoc in his kidneys and gives him a urinary tract infection.  I ordered uauc and told him we can get other tests once we rule out the infection.  He wants renal ultrasound for stones Lynette Walden, [facility] Staff Nurse

## 2020-11-30 NOTE — TELEPHONE ENCOUNTER
Health Call Center    Phone Message    May a detailed message be left on voicemail: yes     Reason for Call: Other: Olu calling to request that Dr. Blunt's care team put in an order for him to have an ultrasound and also maybe some labs. Olu says he had a seizure today (11/30/20), and he would like to have his kidneys looked at since he is having pain in that area. Please give Olu a call back at your earliest convenience to discuss.     Action Taken: Message routed to:  [facility]  Uro    Travel Screening: Not Applicable

## 2020-12-01 DIAGNOSIS — N31.9 NEUROGENIC BLADDER: ICD-10-CM

## 2020-12-01 LAB
ALBUMIN UR-MCNC: 100 MG/DL
APPEARANCE UR: ABNORMAL
BACTERIA #/AREA URNS HPF: ABNORMAL /HPF
BILIRUB UR QL STRIP: NEGATIVE
COLOR UR AUTO: YELLOW
GLUCOSE UR STRIP-MCNC: NEGATIVE MG/DL
HGB UR QL STRIP: ABNORMAL
KETONES UR STRIP-MCNC: NEGATIVE MG/DL
LEUKOCYTE ESTERASE UR QL STRIP: ABNORMAL
NITRATE UR QL: NEGATIVE
PH UR STRIP: 6 PH (ref 5–7)
RBC #/AREA URNS AUTO: 2 /HPF (ref 0–2)
SOURCE: ABNORMAL
SP GR UR STRIP: 1.01 (ref 1–1.03)
UROBILINOGEN UR STRIP-MCNC: 0 MG/DL (ref 0–2)
WBC #/AREA URNS AUTO: >182 /HPF (ref 0–5)
WBC CLUMPS #/AREA URNS HPF: PRESENT /HPF

## 2020-12-01 PROCEDURE — 87088 URINE BACTERIA CULTURE: CPT | Mod: 90 | Performed by: PATHOLOGY

## 2020-12-01 PROCEDURE — 87186 SC STD MICRODIL/AGAR DIL: CPT | Mod: 90 | Performed by: PATHOLOGY

## 2020-12-01 PROCEDURE — 87086 URINE CULTURE/COLONY COUNT: CPT | Mod: 90 | Performed by: PATHOLOGY

## 2020-12-01 PROCEDURE — 81001 URINALYSIS AUTO W/SCOPE: CPT | Performed by: PATHOLOGY

## 2020-12-02 ENCOUNTER — TELEPHONE (OUTPATIENT)
Dept: UROLOGY | Facility: CLINIC | Age: 35
End: 2020-12-02

## 2020-12-02 ENCOUNTER — TRANSFERRED RECORDS (OUTPATIENT)
Dept: HEALTH INFORMATION MANAGEMENT | Facility: CLINIC | Age: 35
End: 2020-12-02

## 2020-12-02 LAB
CREAT SERPL-MCNC: 1.39 MG/DL (ref 0.72–1.25)
GFR SERPL CREATININE-BSD FRML MDRD: 58 ML/MIN/1.73M2
GLUCOSE SERPL-MCNC: 104 MG/DL (ref 65–100)
POTASSIUM SERPL-SCNC: 3.5 MMOL/L (ref 3.5–5)

## 2020-12-02 NOTE — TELEPHONE ENCOUNTER
[facility]    Phone Message    May a detailed message be left on voicemail: yes     Reason for Call: Other: Kristin calling to request an US order for Olu.  She states that he is really wanting one done as they are concerned about kidney stones. Please call her back to discuss       Action Taken: Message routed to:  [facility]  Urology    Travel Screening: Not Applicable

## 2020-12-02 NOTE — TELEPHONE ENCOUNTER
Patient called sister and I had told the patient and family that an order was in his chart for renal ultrasound if they wanted to do this sooner.  His UA is definitely abnormal waiting for culture   Today he is not feeling well and went to [facility] ED Lynette Walden LPN Staff Nurse

## 2020-12-03 LAB
BACTERIA SPEC CULT: ABNORMAL
Lab: ABNORMAL
SPECIMEN SOURCE: ABNORMAL

## 2020-12-04 ENCOUNTER — TELEPHONE (OUTPATIENT)
Dept: UROLOGY | Facility: CLINIC | Age: 35
End: 2020-12-04

## 2020-12-04 NOTE — TELEPHONE ENCOUNTER
Patient went to ed at [facility] and has bladder stone  They treated his UC and appointment made in January to see dr yo . Lynette Walden LPN Staff Nurse

## 2020-12-28 ENCOUNTER — PRE VISIT (OUTPATIENT)
Dept: UROLOGY | Facility: CLINIC | Age: 35
End: 2020-12-28

## 2020-12-28 NOTE — TELEPHONE ENCOUNTER
Reason for visit: consult for kidney stones     Relevant information: Hx: neurogenic bladder, pyelonephritis     Records/imaging/labs/orders: available    Pt called: NA    At Rooming: standard

## 2021-01-04 ENCOUNTER — OFFICE VISIT (OUTPATIENT)
Dept: UROLOGY | Facility: CLINIC | Age: 36
End: 2021-01-04
Payer: COMMERCIAL

## 2021-01-04 VITALS — SYSTOLIC BLOOD PRESSURE: 134 MMHG | HEART RATE: 67 BPM | DIASTOLIC BLOOD PRESSURE: 82 MMHG

## 2021-01-04 DIAGNOSIS — N31.9 NEUROGENIC BLADDER: Primary | ICD-10-CM

## 2021-01-04 DIAGNOSIS — Q64.10 EXSTROPHY OF BLADDER SEQUENCE: Primary | ICD-10-CM

## 2021-01-04 DIAGNOSIS — N21.0 BLADDER STONE: ICD-10-CM

## 2021-01-04 LAB
ALBUMIN UR-MCNC: 100 MG/DL
APPEARANCE UR: ABNORMAL
BACTERIA #/AREA URNS HPF: ABNORMAL /HPF
BILIRUB UR QL STRIP: NEGATIVE
COLOR UR AUTO: YELLOW
GLUCOSE UR STRIP-MCNC: NEGATIVE MG/DL
HGB UR QL STRIP: ABNORMAL
KETONES UR STRIP-MCNC: NEGATIVE MG/DL
LEUKOCYTE ESTERASE UR QL STRIP: ABNORMAL
MUCOUS THREADS #/AREA URNS LPF: PRESENT /LPF
NITRATE UR QL: POSITIVE
PH UR STRIP: 6 PH (ref 5–7)
RBC #/AREA URNS AUTO: 34 /HPF (ref 0–2)
SOURCE: ABNORMAL
SP GR UR STRIP: 1.01 (ref 1–1.03)
UROBILINOGEN UR STRIP-MCNC: 0 MG/DL (ref 0–2)
WBC #/AREA URNS AUTO: >182 /HPF (ref 0–5)
WBC CLUMPS #/AREA URNS HPF: PRESENT /HPF

## 2021-01-04 PROCEDURE — 87186 SC STD MICRODIL/AGAR DIL: CPT | Mod: 90 | Performed by: PATHOLOGY

## 2021-01-04 PROCEDURE — 87086 URINE CULTURE/COLONY COUNT: CPT | Mod: 90 | Performed by: PATHOLOGY

## 2021-01-04 PROCEDURE — 81001 URINALYSIS AUTO W/SCOPE: CPT | Performed by: PATHOLOGY

## 2021-01-04 PROCEDURE — 99214 OFFICE O/P EST MOD 30 MIN: CPT | Performed by: STUDENT IN AN ORGANIZED HEALTH CARE EDUCATION/TRAINING PROGRAM

## 2021-01-04 PROCEDURE — 87088 URINE BACTERIA CULTURE: CPT | Mod: 90 | Performed by: PATHOLOGY

## 2021-01-04 RX ORDER — CEFAZOLIN SODIUM 2 G/50ML
2 SOLUTION INTRAVENOUS
Status: CANCELLED | OUTPATIENT
Start: 2021-01-04

## 2021-01-04 RX ORDER — CEFAZOLIN SODIUM 1 G/50ML
1 INJECTION, SOLUTION INTRAVENOUS SEE ADMIN INSTRUCTIONS
Status: CANCELLED | OUTPATIENT
Start: 2021-01-04

## 2021-01-04 ASSESSMENT — PAIN SCALES - GENERAL: PAINLEVEL: NO PAIN (0)

## 2021-01-04 NOTE — PATIENT INSTRUCTIONS
Daily bladder irrigation with tap water (order for supplies given).  Stressed importance of regular catheterizing q3 hours given worsening hydro.  Cut back on soda pop. Discussed link with stones and obesity.  RTC 1 year with renal U/S.    It was a pleasure meeting with you today.  Thank you for allowing me and my team the privilege of caring for you today.  YOU are the reason we are here, and I truly hope we provided you with the excellent service you deserve.  Please let us know if there is anything else we can do for you so that we can be sure you are leaving completely satisfied with your care experience.

## 2021-01-04 NOTE — PROGRESS NOTES
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 Patient is not wheelchair bound. Last visit with us was 5/2020. He preesnts today with a new diagnosis of 1cm bladder stone noted on imaging at an OSH for UTI. His UTI sx are usually worsening seizures, no LUTS.  He has chronic bilateral hydronephrosis with worsening on last ultrasound (see below). He has recurrent bladder stones. Had percutaneous bladder stone surgery by Dr. Blunt in 2015 and Patrice in 2018.     Previous Bladder Surgeries:  Previous Bladder Augmentation: unknown bowel type in unknown year  Catheterizable stoma:appendiceal Mitrofanoff in unknown year.   Revisions include: none   Anti-incontinence procedures: none  Botox injections: None     Pertinent Medications:  Current Anticholinergics: None  Current Prophylactic antibiotics: None  Intravesical gentamycin:  None  Intravesical oxybutinin: None     Catheterization History:  The patient catheterizes per Mitrofanoff stoma into a augmented bladder with a 14F straight catheter q3-4 hours. Catheterization is performed by  self. The patient uses a new catheter each time. He does not irrigate the bladder. He used catheterize only when he feels full; sometimes he gets flank pain when he feels full but now he does it more regularly. Has also stopped drinking POP per instructions. He does not irrigate vigously, instead just puts in 60cc and lets it drain passively     Incontinence History:  He does not leak between voids/caths. He does not experience urgency of urination.     If patient has an AUS or Sling then:  Leak management: No leak management necessary      Urinary Tract Infection History:  Treated with antibiotics  for positive culture and non-specific symptoms: 0-2 times in the last year  year     Bowel Movement History:  The patient has a bowel movement q1 days. Bowel regimen includes none                Physical Exam:   B/P: Data Unavailable, T: Data Unavailable, P: Data Unavailable, R: Data Unavailable  Estimated body mass index is 32.52 kg/m  as calculated from the following:    Height as of 9/19/18: 1.829 m (6').    Weight as of 9/19/18: 108.7 kg (239 lb 12 oz).  General: age-appropriate appearing male in NAD sitting in an exam chair.    Back: bony spine is non-tender, flanks are non-tender. Surgical scars include none.  Abdomen: Degree of obesity is mild. Abdomen is soft and nontender. No organomegaly. Surgical scars include midline laparotomy. RLQ Mitrofanoff stoma pink.  Motor: Normal in both upper and lower limbs       Data:                                                                      Imaging:  Renal/Bladder Ultrasound (Date = 5/5/20):       Right hydronephrosis: moderate compared to mild 5 years ago       Left hydronephrosis: severe compared to moderate 5 years ago. This is despite the bladder being pretty empty on today's U/S        Kidney stones:None  Bladder:        Wall thickness: none        Bladder stones: None; no mucous either       Bladder mass: None     Labs:           Creatinine   Date Value Ref Range Status   05/11/2020 1.00 0.66 - 1.25 mg/dL Final      No B12 since last one in 2015 that was 412             Assessm ent and Plan:   35 year old male with neurogenic bladder secondary to bladder exstrophy. Current issues include:  1. Recurrent bladder stones  2. Worsening hydronephrosis     Plan:  1. Plan for cystolitholapaxy, possible percutaneous cystolithotomy. Orders placed. Will obtain UA today (no sx currently, so plan to treat 5d before if positive)  2. Daily bladder irrigation with tap water (order for supplies given). Advised 350-500cc a day until clear x1. Technique discussed and patient  took notes.  3. Continue regular catheterizing q3 hours given worsening hydro  4. Repeat SUZY renal U/S.

## 2021-01-04 NOTE — LETTER
1/4/2021       RE: Olu Trinh  [address]  [facility] [address]     Dear Colleague,    Thank you for referring your patient, Olu Trinh, to the [facility] [place] at [facility]. Please see a copy of my visit note below.    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 Patient is not wheelchair bound. Last visit with us was 5/2020. He preesnts today with a new diagnosis of 1cm bladder stone noted on imaging at an OSH for UTI. His UTI sx are usually worsening seizures, no LUTS.  He has chronic bilateral hydronephrosis with worsening on last ultrasound (see below). He has recurrent bladder stones. Had percutaneous bladder stone surgery by Dr. Blunt in 2015 and Patrice in 2018.     Previous Bladder Surgeries:  Previous Bladder Augmentation: unknown bowel type in unknown year  Catheterizable stoma:appendiceal Mitrofanoff in unknown year.   Revisions include: none   Anti-incontinence procedures: none  Botox injections: None     Pertinent Medications:  Current Anticholinergics: None  Current Prophylactic antibiotics: None  Intravesical gentamycin:  None  Intravesical oxybutinin: None     Catheterization History:  The patient catheterizes per Mitrofanoff stoma into a augmented bladder with a 14F straight catheter q3-4 hours. Catheterization is performed by  self. The patient uses a new catheter each time. He does not irrigate the bladder. He used catheterize only when he feels full; sometimes he gets flank pain when he feels full but now he does it more regularly. Has also stopped drinking POP per instructions. He does not irrigate  vigously, instead just puts in 60cc and lets it drain passively     Incontinence History:  He does not leak between voids/caths. He does not experience urgency of urination.     If patient has an AUS or Sling then:  Leak management: No leak management necessary      Urinary Tract Infection History:  Treated with antibiotics for positive culture and non-specific symptoms: 0-2 times in the last year  year     Bowel Movement History:  The patient has a bowel movement q1 days. Bowel regimen includes none                Physical Exam:   B/P: Data Unavailable, T: Data Unavailable, P: Data Unavailable, R: Data Unavailable  Estimated body mass index is 32.52 kg/m  as calculated from the following:    Height as of 9/19/18: 1.829 m (6').    Weight as of 9/19/18: 108.7 kg (239 lb 12 oz).  General: age-appropriate appearing male in NAD sitting in an exam chair.    Back: bony spine is non-tender, flanks are non-tender. Surgical scars include none.  Abdomen: Degree of obesity is mild. Abdomen is soft and nontender. No organomegaly. Surgical scars include midline laparotomy. RLQ Mitrofanoff stoma pink.  Motor: Normal in both upper and lower limbs       Data:                                                                      Imaging:  Renal/Bladder Ultrasound (Date = 5/5/20):       Right hydronephrosis: moderate compared to mild 5 years ago       Left hydronephrosis: severe compared to moderate 5 years ago. This is despite the bladder being pretty empty on today's U/S        Kidney stones:None  Bladder:        Wall thickness: none        Bladder stones: None; no mucous either       Bladder mass: None     Labs:           Creatinine   Date Value Ref Range Status   05/11/2020 1.00 0.66 - 1.25 mg/dL Final      No B12 since last one in 2015 that was 412             Assessm ent and Plan:   35 year old male with neurogenic bladder secondary to bladder exstrophy. Current issues include:  1. Recurrent bladder stones  2. Worsening  hydronephrosis     Plan:  1. Plan for cystolitholapaxy, possible percutaneous cystolithotomy. Orders placed. Will obtain UA today (no sx currently, so plan to treat 5d before if positive)  2. Daily bladder irrigation with tap water (order for supplies given). Advised 350-500cc a day until clear x1. Technique discussed and patient took notes.  3. Continue regular catheterizing q3 hours given worsening hydro  4. Repeat SUZY renal U/S.         Coleman Vogel MD

## 2021-01-05 ENCOUNTER — TELEPHONE (OUTPATIENT)
Dept: UROLOGY | Facility: CLINIC | Age: 36
End: 2021-01-05

## 2021-01-07 LAB
BACTERIA SPEC CULT: ABNORMAL
BACTERIA SPEC CULT: ABNORMAL
Lab: ABNORMAL
SPECIMEN SOURCE: ABNORMAL

## 2021-01-11 ENCOUNTER — TELEPHONE (OUTPATIENT)
Dept: UROLOGY | Facility: CLINIC | Age: 36
End: 2021-01-11

## 2021-01-11 DIAGNOSIS — Z11.59 ENCOUNTER FOR SCREENING FOR OTHER VIRAL DISEASES: ICD-10-CM

## 2021-01-11 NOTE — TELEPHONE ENCOUNTER
Patient is scheduled for surgery with Dr. Blunt      Spoke or left message with N/A / returned patient's call from a voice mail left prior and mail box is now full. Patient stated 1/22/21 or 1/29/21 will work for surgery date    Date of Surgery: 1/29/21    Location: [facility] OR    Informed patient they will need an adult  yes    H&P: Scheduled with PAC 1/25/21    Additional imaging/appointments: Covid 1/25/21    Surgery packet: to be mailed by 1/14/21     Additional comments: N/A

## 2021-01-12 NOTE — TELEPHONE ENCOUNTER
FUTURE VISIT INFORMATION      SURGERY INFORMATION:    Date: 21    [facility]    Surgeon:  Joel Blunt MD    Anesthesia Type:  general    Procedure: CYSTOLITHOTOMY, PERCUTANEOUS    RECORDS REQUESTED FROM:       Primary Care Provider: [facility]     Most recent EKG+ Tracin20- Richard

## 2021-01-20 DIAGNOSIS — Z01.818 PREOP GENERAL PHYSICAL EXAM: Primary | ICD-10-CM

## 2021-01-25 ENCOUNTER — PRE VISIT (OUTPATIENT)
Dept: SURGERY | Facility: CLINIC | Age: 36
End: 2021-01-25

## 2021-01-25 ENCOUNTER — OFFICE VISIT (OUTPATIENT)
Dept: LAB | Facility: CLINIC | Age: 36
End: 2021-01-25
Payer: COMMERCIAL

## 2021-01-25 ENCOUNTER — OFFICE VISIT (OUTPATIENT)
Dept: SURGERY | Facility: CLINIC | Age: 36
End: 2021-01-25
Payer: COMMERCIAL

## 2021-01-25 ENCOUNTER — ANESTHESIA EVENT (OUTPATIENT)
Dept: SURGERY | Facility: AMBULATORY SURGERY CENTER | Age: 36
End: 2021-01-25

## 2021-01-25 VITALS
BODY MASS INDEX: 35.89 KG/M2 | TEMPERATURE: 97.6 F | WEIGHT: 265 LBS | HEART RATE: 77 BPM | RESPIRATION RATE: 16 BRPM | HEIGHT: 72 IN | OXYGEN SATURATION: 97 % | SYSTOLIC BLOOD PRESSURE: 136 MMHG | DIASTOLIC BLOOD PRESSURE: 100 MMHG

## 2021-01-25 DIAGNOSIS — Z01.818 PRE-OP EXAMINATION: ICD-10-CM

## 2021-01-25 DIAGNOSIS — Z01.818 PRE-OPERATIVE EXAMINATION: Primary | ICD-10-CM

## 2021-01-25 DIAGNOSIS — Z11.59 ENCOUNTER FOR SCREENING FOR OTHER VIRAL DISEASES: ICD-10-CM

## 2021-01-25 DIAGNOSIS — Z01.818 PREOP GENERAL PHYSICAL EXAM: ICD-10-CM

## 2021-01-25 LAB
ALBUMIN UR-MCNC: 100 MG/DL
APPEARANCE UR: ABNORMAL
BACTERIA #/AREA URNS HPF: ABNORMAL /HPF
BILIRUB UR QL STRIP: NEGATIVE
COLOR UR AUTO: YELLOW
GLUCOSE UR STRIP-MCNC: NEGATIVE MG/DL
HGB UR QL STRIP: ABNORMAL
KETONES UR STRIP-MCNC: NEGATIVE MG/DL
LEUKOCYTE ESTERASE UR QL STRIP: ABNORMAL
MUCOUS THREADS #/AREA URNS LPF: PRESENT /LPF
NITRATE UR QL: POSITIVE
PH UR STRIP: 6 PH (ref 5–7)
RBC #/AREA URNS AUTO: 16 /HPF (ref 0–2)
SARS-COV-2 RNA RESP QL NAA+PROBE: NORMAL
SOURCE: ABNORMAL
SP GR UR STRIP: 1.01 (ref 1–1.03)
SPECIMEN SOURCE: NORMAL
UROBILINOGEN UR STRIP-MCNC: 0 MG/DL (ref 0–2)
WBC #/AREA URNS AUTO: >182 /HPF (ref 0–5)
WBC CLUMPS #/AREA URNS HPF: PRESENT /HPF

## 2021-01-25 PROCEDURE — U0005 INFEC AGEN DETEC AMPLI PROBE: HCPCS | Mod: 90 | Performed by: PATHOLOGY

## 2021-01-25 PROCEDURE — 87088 URINE BACTERIA CULTURE: CPT | Mod: 90 | Performed by: PATHOLOGY

## 2021-01-25 PROCEDURE — 87086 URINE CULTURE/COLONY COUNT: CPT | Mod: 90 | Performed by: PATHOLOGY

## 2021-01-25 PROCEDURE — 87186 SC STD MICRODIL/AGAR DIL: CPT | Mod: 90 | Performed by: PATHOLOGY

## 2021-01-25 PROCEDURE — 81001 URINALYSIS AUTO W/SCOPE: CPT | Performed by: PATHOLOGY

## 2021-01-25 PROCEDURE — 99202 OFFICE O/P NEW SF 15 MIN: CPT | Performed by: PHYSICIAN ASSISTANT

## 2021-01-25 PROCEDURE — U0003 INFECTIOUS AGENT DETECTION BY NUCLEIC ACID (DNA OR RNA); SEVERE ACUTE RESPIRATORY SYNDROME CORONAVIRUS 2 (SARS-COV-2) (CORONAVIRUS DISEASE [COVID-19]), AMPLIFIED PROBE TECHNIQUE, MAKING USE OF HIGH THROUGHPUT TECHNOLOGIES AS DESCRIBED BY CMS-2020-01-R: HCPCS | Mod: 90 | Performed by: PATHOLOGY

## 2021-01-25 RX ORDER — LEVETIRACETAM 500 MG/1
500 TABLET, FILM COATED, EXTENDED RELEASE ORAL DAILY
COMMUNITY
End: 2022-11-14

## 2021-01-25 RX ORDER — OMEGA-3 FATTY ACIDS/FISH OIL 300-1000MG
200 CAPSULE ORAL EVERY 4 HOURS PRN
Status: ON HOLD | COMMUNITY
End: 2023-07-31

## 2021-01-25 ASSESSMENT — ENCOUNTER SYMPTOMS
SEIZURES: 1
DYSRHYTHMIAS: 0

## 2021-01-25 ASSESSMENT — COPD QUESTIONNAIRES: COPD: 0

## 2021-01-25 ASSESSMENT — LIFESTYLE VARIABLES: TOBACCO_USE: 0

## 2021-01-25 ASSESSMENT — MIFFLIN-ST. JEOR: SCORE: 2175.03

## 2021-01-25 NOTE — PATIENT INSTRUCTIONS
Preparing for Your Surgery      Name:  Olu Trinh   MRN:  [id number]   :  1985   Today's Date:  2021         Arriving for surgery:  Surgery date:  2021  Arrival time: 8:45 am     Restrictions due to COVID 19:  One consistent visitor is allowed per patient  No ill visitors  All visitors must wear face mask     parking is available for anyone with mobility limitations or disabilities. (Monday- Friday 7 am- 5 pm)    Please come to:    [facility]  [address] [place] [id number]    Please check in on the 5th floor at the [facility]ulatory [facility] Center       What can I eat or drink?    -  You may eat and drink normally until 8 hours before surgery. (Until Midnight)  -  You may have clear liquids up to 4 hours before surgery. (Until 6 am)  Examples of clear liquids:  Water  Clear broth  Juices (apple, white grape, white cranberry  and cider) without pulp  Noncarbonated, powder based beverages  (lemonade and Juan Carlos-Aid)  Sodas (Sprite, 7-Up, ginger ale and seltzer)  Coffee or tea (without milk or cream)  Gatorade    --No alcohol for at least 24 hours before surgery    Which medicines can I take?    Hold Aspirin for 7 days before surgery.   Hold Multivitamins for 7 days before surgery.  Hold Supplements for 7 days before surgery.  Hold Ibuprofen (Advil, Motrin) for 1 day before surgery--unless otherwise directed by surgeon.  Hold Naproxen (Aleve) for 4 days before surgery.        -  PLEASE TAKE the following medications the day of surgery   Ever           How do I prepare myself?  - Please take 2 showers before surgery using Scrubcare or Hibiclens soap.    Use this soap only from the neck to your toes.     Leave the soap on your skin for one minute--then rinse thoroughly.      You may use your own shampoo and conditioner; no other hair products.   - Please remove all jewelry and body piercings.  - No lotions, deodorants or fragrance.  - No makeup or  fingernail polish.   - Bring your ID and insurance card.        - All patients are required to have a Covid-19 test within 4 days of surgery/procedure.      -Patients will be contacted by the [facility] scheduling team within 1 week of surgery to make an appointment.      - Patients may call the Scheduling team at [phone] if they have not been scheduled within 4 days of  surgery.      ALL PATIENTS ARE REQUIRED TO HAVE A RESPONSIBLE ADULT TO DRIVE AND BE IN ATTENDANCE WITH THEM FOR 24 HOURS FOLLOWING SURGERY       Questions or Concerns:    -For questions regarding the day of surgery please contact the Ambulatory Surgery Center at [phone].    -If you have health changes between today and your surgery please contact your surgeon.     For questions after surgery please call your surgeons office.

## 2021-01-25 NOTE — H&P
Pre-Operative H & P     CC:  Preoperative exam to assess for increased cardiopulmonary risk while undergoing surgery and anesthesia.    Date of Encounter: 1/25/2021  Primary Care Physician:  No Ref-Primary, Physician     Reason for visit: pre operative examination, bladder stone    HPI  Olu Trinh is a 35 year old male who presents for pre-operative H & P in preparation for Cystoscopy, Possible Cystolitholapaxy with Dr. Clement on 1/29/21 at [facility].     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 He presented to the ED on 12/2/20 for concerns of pyelonephritis. The patient reports increased seizures which was similar to several months prior presentation for pyelonephritis. He was treated with antibiotics and followed up with urology on 1.4.21. They discussed his findings of the kidney stone  and continue chronic bilateral hydronephrosis. The patient is now scheduled for the procedure as above.      History is obtained from the patient and chart review    Past Medical History  Past Medical History:   Diagnosis Date     Attention deficit disorder with hyperactivity(314.01)      Bladder stone 06/02/2015     Exstrophy of bladder sequence 06/29/2015     Learning disability      Neurogenic bladder, NOS 10/30/2012     Obesity (BMI 35.0-39.9 without comorbidity)      Other hydronephrosis 06/02/2015     Unspecified epilepsy with intractable epilepsy 10/22/2013       Past Surgical History  Past Surgical History:   Procedure Laterality Date     BLADDER AUGMENTATION       bladder neck reconstruction      Mikey MONGE REMOVAL OF KIDNEY STONE       CYSTOSCOPY N/A 07/10/2018    Procedure: CYSTOSCOPY;  CYSTOSCOPY  OF NEOBLADDER  ;  Surgeon: Mario Ibrahim MD;  Location: [facility] OR     LASER HOLMIUM LITHOTRIPSY BLADDER N/A 07/24/2015    Procedure:  LASER HOLMIUM LITHOTRIPSY BLADDER;  Surgeon: Joel Blunt MD;  Location: [facility]     LASER HOLMIUM LITHOTRIPSY BLADDER N/A 07/10/2018    Procedure: LASER HOLMIUM LITHOTRIPSY BLADDER;;  Surgeon: Mario Ibrahim MD;  Location: [facility] OR     LEFT TEMPORAL CRANIOTOMY FOR ANTERIOR TEMPORAL LOBECTOMY  10/01/2018     MITROFANOFF PROCEDURE (APPENDIX CONDUIT)  01/01/2002       Hx of Blood transfusions/reactions: denies     Hx of abnormal bleeding or anti-platelet use: none    Menstrual history: No LMP for male patient.:     Steroid use in the last year: none    Personal or FH with difficulty with Anesthesia:  denies    Prior to Admission Medications  Current Outpatient Medications   Medication Sig Dispense Refill     ibuprofen (ADVIL/MOTRIN) 200 MG capsule Take 200 mg by mouth every 4 hours as needed for fever       levETIRAcetam (KEPPRA XR) 500 MG 24 hr tablet Take 500 mg by mouth daily Takes 4 tablet every am         Allergies  Allergies   Allergen Reactions     Sulfa Drugs      unknown       Social History  Social History     Socioeconomic History     Marital status: Single     Spouse name: Not on file     Number of children: Not on file     Years of education: Not on file     Highest education level: Not on file   Occupational History     Occupation: Instrument processor   Social Needs     Financial resource strain: Not on file     Food insecurity     Worry: Not on file     Inability: Not on file     Transportation needs     Medical: Not on file     Non-medical: Not on file   Tobacco Use     Smoking status: Never Smoker     Smokeless tobacco: Never Used   Substance and Sexual Activity     Alcohol use: Yes     Comment: Alcoholic beverage once or twice per week.     Drug use: No     Sexual activity: Never   Lifestyle     Physical activity     Days per week: Not on file     Minutes per session: Not on file     Stress: Not on file   Relationships     Social connections     Talks on phone: Not on file     Gets together:  Not on file     Attends Sabianism service: Not on file     Active member of club or organization: Not on file     Attends meetings of clubs or organizations: Not on file     Relationship status: Not on file     Intimate partner violence     Fear of current or ex partner: Not on file     Emotionally abused: Not on file     Physically abused: Not on file     Forced sexual activity: Not on file   Other Topics Concern     Parent/sibling w/ CABG, MI or angioplasty before 65F 55M? No   Social History Narrative     Not on file       Family History  Family History   Problem Relation Age of Onset     Seizure Disorder Mother      Breast Cancer Mother      Cancer Father        Review of Systems    ROS/MED HX    ENT/Pulmonary: Comment: Easy; Direct laryngoscopy; Straight; Oral; 10/01/18; 0821 (created via procedure documentation); Cuffed; 8 mm; Yes; Mac; 3; 1; 1; End tidal CO2, Auscultation, Symmetrical chest wall movement; Pharynx clear, Atraumatic, Dentition unchanged; 23; Lips; Yes; Soft; 0 (not difficult); 10/01/18; 1147 - neg pulmonary ROS  (-) tobacco use, asthma, COPD and sleep apnea   Neurologic: Comment: S/p left temporal craniotomy for anterior temporal lobectomy for seizures.     (+) seizures, last seizure: 12/2020, features: cousious tonic clonic due to pyelonephritis ,  (-) no CVA, no TIA and migraines   Cardiovascular:  - neg cardiovascular ROS   (+) -----Previous cardiac testing   Echo: Date: Results:    Stress Test: Date: Results:    ECG Reviewed: Date: 9/11/20 Results:  Normal sinus rhythm, T wave abnormality  Cath: Date: Results:     (-) hypertension, CAD, BELL, arrhythmias, valvular problems/murmurs and dyslipidemia   METS/Exercise Tolerance:  >4 METS   Hematologic:  - neg hematologic  ROS    (-) history of blood clots, anemia and other hematologic disorder   Musculoskeletal:  - neg musculoskeletal ROS    (-) arthritis   GI/Hepatic:  - neg GI/hepatic ROS    (-) GERD and liver disease   Renal/Genitourinary:    "  (+) renal disease (ureteral stones), Nephrolithiasis , Other Renal/ Genitourinary, neurogenic bladder, s/p mitrofanoff, requires straight cath,      Endo:  - neg endo ROS    (-) Type I DM, Type II DM, thyroid disease and obesity   Psychiatric:     (+) psychiatric history    (-) chronic opioid use history   Infectious Disease:  - neg infectious disease ROS    (-) Recent Fever   Malignancy:       Other:    (+) , no H/O Chronic Pain,       The complete review of systems is negative other than noted in the HPI or here.   Temp: 97.6  F (36.4  C) Temp src: Oral BP: (!) 146/100 Pulse: 77   Resp: 16 SpO2: 97 %         265 lbs 0 oz  6' 0\"   Body mass index is 35.94 kg/m .       Physical Exam  Constitutional: Awake, alert, cooperative, no apparent distress, and appears stated age.  Eyes: Pupils equal, round and reactive to light, extra ocular muscles intact, sclera clear, conjunctiva normal.  HENT: Normocephalic, oral pharynx with moist mucus membranes, good dentition. No goiter appreciated.   Respiratory: Clear to auscultation bilaterally, no crackles or wheezing.  Cardiovascular: Regular rate and rhythm, normal S1 and S2, and no murmur noted.  Carotids +2, no bruits. No edema - left calf < right calf. No erythema, pain or pitting. Palpable pulses to radial  DP and PT arteries.   GI: Normal bowel sounds, soft, non-distended, non-tender, no masses palpated, no hepatosplenomegaly.  Surgical scars: well healed. Opening of mitrofanoff in RLQ.   Lymph/Hematologic: No cervical lymphadenopathy and no supraclavicular lymphadenopathy.  Genitourinary:  defer  Skin: Warm and dry.  No rashes at anticipated surgical site.   Musculoskeletal: Full ROM of neck. There is no redness, warmth, or swelling of the joints. Gross motor strength is normal.    Neurologic: Awake, alert, oriented to name, place and time. Cranial nerves II-XII are grossly intact. Gait is normal.   Neuropsychiatric: Calm, cooperative. Normal affect.     Labs: " (personally reviewed)  BASIC METABOLIC PANEL (12/02/2020 10:21 AM CST)  BASIC METABOLIC PANEL (12/02/2020 10:21 AM CST)   Component Value Ref Range Performed At Pathologist Signature   SODIUM 133 (L) 135 - 145 [facility]/L [facility]     POTASSIUM 3.5 3.5 - 5.0 mmol/L [facility]     CHLORIDE 102 98 - 110 mmol/L [facility]     CO2,TOTAL 23 21 - 31 mmol/L [facility]     ANION GAP 8 5 - 18  [facility]     GLUCOSE 104 (H) 65 - 100 mg/dL [facility]     CALCIUM 10.1 8.5 - 10.5 mg/dL [facility]     BUN 28 (H) 8 - 25 mg/dL [facility]     CREATININE 1.39 (H) 0.72 - 1.25 mg/dL [facility]     BUN/CREAT RATIO  20 10 - 20  [facility]     GFR if African American >60 >60 ml/min/1.73m2 [facility]     GFR if not African American 58 (L) >60 ml/min/1.73m2 [facility]     CBC W PLT NO DIFF (12/02/2020 10:21 AM CST)  CBC W PLT NO DIFF (12/02/2020 10:21 AM CST)   Component Value Ref Range Performed At Pathologist Signature   WHITE BLOOD COUNT  15.6 (H) 4.5 - 11.0 thou/cu mm [facility]     RED BLOOD COUNT  5.56 4.30 - 5.90 mil/cu mm [facility]     HEMOGLOBIN  16.4 13.5 - 17.5 g/dL [facility]     HEMATOCRIT  47.4 37.0 - 53.0 % [facility]     MCV  85 80 - 100 fL [facility]     MCH  29.5 26.0 - 34.0 pg [facility]     MCHC  34.6 32.0 - 36.0 g/dL [facility]     RDW  13.6 11.5 - 15.5 % [facility]      PLATELET COUNT  135 (L) 140 - 440 thou/cu mm [facility]     MPV  9.1 6.5 - 11.0 fL [facility]       EK20  Normal sinus rhythm  Nonspecific anterior T wave abnormality    CT Abdomen Pelvis 20    IMPRESSION:    1. Slight decrease in bilateral hydronephrosis with no new    obstructing ureteral calculi.    2. Stable calculi in lower pole left kidney and bladder.    3. Persistent perinephric stranding about the right kidney    could suggest element of ongoing pyelonephritis. No signs    of obvious perinephric fluid collection or abscess.    4. Bladder exstrophy changes with bladder repair and    diastasis of the symphysis pubis.    The patient's records and results personally reviewed by this provider.     Outside records reviewed from: care everywhere     ASSESSMENT and PLAN  Olu is a 35 year old man who is scheduled for CYSTOLITHOTOMY, PERCUTANEOUS  on 21 by Dr. Blunt in treatment of bladder stone.  PAC referral for risk assessment and optimization for anesthesia with comorbid conditions of seizures, ADHD, congenital bladder extrophy, neurogenic bladder, s/p mitrofanoff:    Pre-operative considerations:  1.  Cardiac:  Functional status- METS >4. The patient bikes everywhere. He does 30 miles in a day and often dose 2500 miles in a week. Low risk surgery with 0.9% (RCRI #) risk of major adverse cardiac event. He denies any cardiac symptoms. He biked right before our visit and his blood pressure was 146/100. The patient did have an EKG in 2020 with NSR and non specific anterior T wave abnormality. He will plan to drive the day of surgery. No further testing indicated.     2.  Pulm:  Airway feasible.  SRAVANI risk: Low (male)    3. Endo: Obesity, BMI 35 - consideration for safe lifting techniques.     4. Neuro: seizures -tonic clonic. The patient is s/p left temporal craniotomy for anterior temporal lobectomy for seizures. He  reports with pyelonephritis he does get clusters of seizures that he is conscious for. His last was in 12/2020. He will continue Keppra.     5. GI:  Risk of PONV score = 2.  If > 2, anti-emetic intervention recommended.    6. : Congenital bladder extrophy/neurogenic bladder s/p mitrofanoff - the patient self caths every 3 hours.   ~ History of pyelonephritis - the patient is not currently on antibiotics.  ~ Bladder stone - the patient did a urine sample today per urology surgery protocol. Procedure as above.     7. Psych: ADHD - not on medications.     8. Heme: The patient has a history of thrombocytopenia - last platelets 135 - low bleeding risk procedure.     VTE risk: 0.5%    Socorro Sales PA-C  Preoperative Assessment Center  [facility]  Phone: [phone]  Fax: [phone]

## 2021-01-25 NOTE — ANESTHESIA PREPROCEDURE EVALUATION
Anesthesia Pre-Procedure Evaluation    Patient: Olu Trinh   MRN:     [id number] Gender:   male   Age:    35 year old :      1985        Preoperative Diagnosis: Bladder stone [N21.0]   Procedure(s):  CYSTOLITHOTOMY, PERCUTANEOUS     LABS:  CBC:   Lab Results   Component Value Date    WBC 6.0 2018    WBC 4.5 2015    HGB 15.5 2018    HGB 15.2 2015    HCT 47.2 2018    HCT 44.9 2015     (L) 2018     2015     BMP:   Lab Results   Component Value Date     2018     2018    POTASSIUM 3.5 2020    POTASSIUM 4.3 2020    CHLORIDE 107 2018    CHLORIDE 107 2018    CO2 24 2018    CO2 31 2018    BUN 19 2018    BUN 18 2018    CR 1.39 (H) 2020    CR 0.93 2020     (H) 2020    GLC 81 2018     COAGS: No results found for: PTT, INR, FIBR  POC: No results found for: BGM, HCG, HCGS  OTHER:   Lab Results   Component Value Date    MIO 9.6 2018    ALBUMIN 4.0 2018    PROTTOTAL 7.7 2018    ALT 31 2018    AST 20 2018    ALKPHOS 50 2018    BILITOTAL 0.3 2018        Preop Vitals    BP Readings from Last 3 Encounters:   21 134/82   18 129/84   07/10/18 133/87    Pulse Readings from Last 3 Encounters:   21 67   18 62   07/10/18 (!) 48      Resp Readings from Last 3 Encounters:   18 16   07/10/18 16   18 23    SpO2 Readings from Last 3 Encounters:   18 100%   07/10/18 99%   18 96%      Temp Readings from Last 1 Encounters:   18 96.2  F (35.7  C) (Tympanic)    Ht Readings from Last 1 Encounters:   18 1.829 m (6')      Wt Readings from Last 1 Encounters:   18 108.7 kg (239 lb 12 oz)    Estimated body mass index is 32.52 kg/m  as calculated from the following:    Height as of 18: 1.829 m (6').    Weight as of 18: 108.7 kg (239 lb 12 oz).      LDA:  Peripheral IV 07/10/18 Left Hand (Active)   Number of days: 930       Peripheral IV 07/10/18 Left Hand (Active)   Number of days: 930       Suprapubic Catheter Double-lumen;Non-latex 20 fr (Active)   Number of days: 2012        Past Medical History:   Diagnosis Date     Attention deficit disorder with hyperactivity(314.01)      Bladder stone 6/2/2015     Exstrophy of bladder sequence 6/29/2015     Learning disability      Neurogenic bladder, NOS 10/30/2012     Other hydronephrosis 6/2/2015     Unspecified epilepsy with intractable epilepsy 10/22/2013      Past Surgical History:   Procedure Laterality Date     BLADDER AUGMENTATION       bladder neck reconstruction      Mikey MONGE REMOVAL OF KIDNEY STONE       CYSTOSCOPY N/A 7/10/2018    Procedure: CYSTOSCOPY;  CYSTOSCOPY  OF NEOBLADDER  ;  Surgeon: Mario Ibrahim MD;  Location: [facility] OR     LASER HOLMIUM LITHOTRIPSY BLADDER N/A 7/24/2015    Procedure: LASER HOLMIUM LITHOTRIPSY BLADDER;  Surgeon: Joel Blunt MD;  [facility]     LASER HOLMIUM LITHOTRIPSY BLADDER N/A 7/10/2018    Procedure: LASER HOLMIUM LITHOTRIPSY BLADDER;;  Surgeon: Mario Ibrahim MD;  Location: [facility] OR     MITROFANOFF PROCEDURE (APPENDIX CONDUIT)  2002      Allergies   Allergen Reactions     Sulfa Drugs      unknown        Anesthesia Evaluation     . Pt has had prior anesthetic. Type: General.    No history of anesthetic complications          ROS/MED HX    ENT/Pulmonary: Comment: Easy; Direct laryngoscopy; Straight; Oral; 10/01/18; 0821 (created via procedure documentation); Cuffed; 8 mm; Yes; Mac; 3; 1; 1; End tidal CO2, Auscultation, Symmetrical chest wall movement; Pharynx clear, Atraumatic, Dentition unchanged; 23; Lips; Yes; Soft; 0 (not difficult); 10/01/18; 1147 - neg pulmonary ROS  (-) tobacco use, asthma, COPD and sleep apnea   Neurologic: Comment: S/p left temporal craniotomy for anterior temporal lobectomy for seizures.     (+) seizures, last  seizure: 12/2020, features: cousious tonic clonic due to pyelonephritis ,  (-) no CVA, no TIA and migraines   Cardiovascular:  - neg cardiovascular ROS   (+) -----Previous cardiac testing   Echo: Date: Results:    Stress Test: Date: Results:    ECG Reviewed: Date: 9/11/20 Results:  Normal sinus rhythm, non specific T wave abnormality  Cath: Date: Results:     (-) hypertension, CAD, BELL, arrhythmias, valvular problems/murmurs and dyslipidemia   METS/Exercise Tolerance:  >4 METS   Hematologic: Comments: Thrombocytopenia      (-) history of blood clots, anemia and other hematologic disorder   Musculoskeletal:  - neg musculoskeletal ROS    (-) arthritis   GI/Hepatic:  - neg GI/hepatic ROS    (-) GERD and liver disease   Renal/Genitourinary:     (+) renal disease (ureteral stones), Nephrolithiasis , Other Renal/ Genitourinary, neurogenic bladder, s/p mitrofanoff, requires straight cath,      Endo:  - neg endo ROS    (-) Type I DM, Type II DM, thyroid disease and obesity   Psychiatric:     (+) psychiatric history    (-) chronic opioid use history   Infectious Disease:  - neg infectious disease ROS    (-) Recent Fever   Malignancy:       Other:    (+) , no H/O Chronic Pain,                       PHYSICAL EXAM:   Mental Status/Neuro: A/A/O; Age Appropriate   Airway: Facies: Feasible  Mallampati: II  Mouth/Opening: Full  TM distance: > 6 cm  Neck ROM: Full   Respiratory: Auscultation: CTAB     Resp. Rate: Normal     Resp. Effort: Normal      CV: Rhythm: Regular  Heart: Normal Sounds  Edema: None  Pulses: Normal  Neck: Normal   Comments:                      Assessment:   ASA SCORE: 3    H&P: History and physical reviewed and following examination; no interval change.   Smoking Status:  Non-Smoker/Unknown   NPO Status: NPO Appropriate     Plan:   Anes. Type:  General   Pre-Medication: None   Induction:  N/a   Airway: LMA   Access/Monitoring: PIV   Maintenance: TIVA     Postop Plan:   Postop Pain: None  Postop Sedation/Airway:  Not planned  Disposition: Outpatient     PONV Management:   Adult Risk Factors:, Non-Smoker   Prevention: Ondansetron, Dexamethasone, No Volatiles     CONSENT: Direct conversation   Plan and risks discussed with: Patient                [unfilled]  PAC Discussion and Assessment    ASA Classification: 2  Case is suitable for: ASC  Anesthetic techniques and relevant risks discussed: GA                  PAC Resident/NP Anesthesia Assessment: Olu is a 35 year old man who is scheduled for CYSTOLITHOTOMY, PERCUTANEOUS  on 1/29/21 by Dr. Blunt in treatment of bladder stone.  PAC referral for risk assessment and optimization for anesthesia with comorbid conditions of seizures, ADHD, congenital bladder extrophy, neurogenic bladder, s/p mitrofanoff:    Pre-operative considerations:  1.  Cardiac:  Functional status- METS >4. The patient bikes everywhere. He does 30 miles in a day and often dose 2500 miles in a week. Low risk surgery with 0.9% (RCRI #) risk of major adverse cardiac event. He denies any cardiac symptoms. He biked right before our visit and his blood pressure was 146/100. The patient did have an EKG in 9/2020 with NSR and non specific anterior T wave abnormality. He will plan to drive the day of surgery. No further testing indicated.      2.  Pulm:  Airway feasible.  SRAVANI risk: Low (male)    3. Endo: Obesity, BMI 35 - consideration for safe lifting techniques.     4. Neuro: seizures -tonic clonic. The patient is s/p left temporal craniotomy for anterior temporal lobectomy for seizures. He reports with pyelonephritis he does get clusters of seizures that he is conscious for. His last was in 12/2020. He will continue Kera.     5. GI:  Risk of PONV score = 2.  If > 2, anti-emetic intervention recommended.    6. : Congenital bladder extrophy/neurogenic bladder s/p mitrofanoff - the patient self caths every 3 hours.   ~ History of pyelonephritis - the patient is not currently on antibiotics.  ~ Bladder  stone - the patient did a urine sample today per urology surgery protocol. Procedure as above.     7. Psych: ADHD - not on medications.     8. Heme: The patient has a history of thrombocytopenia - last platelets 135 - low bleeding risk procedure.     VTE risk: 0.5%    Patient is optimized and is acceptable candidate for the proposed procedure.  No further diagnostic evaluation is needed.     For further details of assessment, testing, and physical exam please see H and P completed on same date.    Socorro Sales PA-C        Mid-Level Provider/Resident: Socorro Sales PA-C  Date: 1/25/21                                  Socorro Sales PA-C

## 2021-01-26 LAB
BACTERIA SPEC CULT: ABNORMAL
LABORATORY COMMENT REPORT: NORMAL
Lab: ABNORMAL
SARS-COV-2 RNA RESP QL NAA+PROBE: NEGATIVE
SPECIMEN SOURCE: ABNORMAL
SPECIMEN SOURCE: NORMAL

## 2021-01-27 DIAGNOSIS — N39.0 URINARY TRACT INFECTION: Primary | ICD-10-CM

## 2021-01-27 RX ORDER — CIPROFLOXACIN 500 MG/1
500 TABLET, FILM COATED ORAL 2 TIMES DAILY
Qty: 10 TABLET | Refills: 0 | Status: SHIPPED | OUTPATIENT
Start: 2021-01-27 | End: 2021-02-01

## 2021-01-29 ENCOUNTER — ANESTHESIA (OUTPATIENT)
Dept: SURGERY | Facility: AMBULATORY SURGERY CENTER | Age: 36
End: 2021-01-29

## 2021-01-29 ENCOUNTER — HOSPITAL ENCOUNTER (OUTPATIENT)
Facility: AMBULATORY SURGERY CENTER | Age: 36
Discharge: HOME | End: 2021-01-29
Attending: UROLOGY | Admitting: UROLOGY
Payer: COMMERCIAL

## 2021-01-29 VITALS
HEIGHT: 72 IN | RESPIRATION RATE: 16 BRPM | WEIGHT: 245 LBS | HEART RATE: 62 BPM | BODY MASS INDEX: 33.18 KG/M2 | SYSTOLIC BLOOD PRESSURE: 114 MMHG | DIASTOLIC BLOOD PRESSURE: 60 MMHG | TEMPERATURE: 97.5 F | OXYGEN SATURATION: 100 %

## 2021-01-29 DIAGNOSIS — N21.0 BLADDER STONE: ICD-10-CM

## 2021-01-29 PROCEDURE — 52000 CYSTOURETHROSCOPY: CPT

## 2021-01-29 RX ORDER — SODIUM CHLORIDE, SODIUM LACTATE, POTASSIUM CHLORIDE, CALCIUM CHLORIDE 600; 310; 30; 20 MG/100ML; MG/100ML; MG/100ML; MG/100ML
INJECTION, SOLUTION INTRAVENOUS CONTINUOUS
Status: DISCONTINUED | OUTPATIENT
Start: 2021-01-29 | End: 2021-01-30 | Stop reason: HOSPADM

## 2021-01-29 RX ORDER — GENTAMICIN SULFATE 100 MG/100ML
100 INJECTION, SOLUTION INTRAVENOUS
Status: COMPLETED | OUTPATIENT
Start: 2021-01-29 | End: 2021-01-29

## 2021-01-29 RX ORDER — ACETAMINOPHEN 325 MG/1
975 TABLET ORAL ONCE
Status: COMPLETED | OUTPATIENT
Start: 2021-01-29 | End: 2021-01-29

## 2021-01-29 RX ORDER — NALOXONE HYDROCHLORIDE 0.4 MG/ML
0.2 INJECTION, SOLUTION INTRAMUSCULAR; INTRAVENOUS; SUBCUTANEOUS
Status: DISCONTINUED | OUTPATIENT
Start: 2021-01-29 | End: 2021-01-30 | Stop reason: HOSPADM

## 2021-01-29 RX ORDER — AMPICILLIN 2 G/1
2 INJECTION, POWDER, FOR SOLUTION INTRAVENOUS
Status: COMPLETED | OUTPATIENT
Start: 2021-01-29 | End: 2021-01-29

## 2021-01-29 RX ORDER — PROPOFOL 10 MG/ML
INJECTION, EMULSION INTRAVENOUS PRN
Status: DISCONTINUED | OUTPATIENT
Start: 2021-01-29 | End: 2021-01-29

## 2021-01-29 RX ORDER — ALBUTEROL SULFATE 0.83 MG/ML
2.5 SOLUTION RESPIRATORY (INHALATION) EVERY 4 HOURS PRN
Status: DISCONTINUED | OUTPATIENT
Start: 2021-01-29 | End: 2021-01-29 | Stop reason: HOSPADM

## 2021-01-29 RX ORDER — ONDANSETRON 2 MG/ML
INJECTION INTRAMUSCULAR; INTRAVENOUS PRN
Status: DISCONTINUED | OUTPATIENT
Start: 2021-01-29 | End: 2021-01-29

## 2021-01-29 RX ORDER — LIDOCAINE 40 MG/G
CREAM TOPICAL
Status: DISCONTINUED | OUTPATIENT
Start: 2021-01-29 | End: 2021-01-29 | Stop reason: HOSPADM

## 2021-01-29 RX ORDER — NALOXONE HYDROCHLORIDE 0.4 MG/ML
0.4 INJECTION, SOLUTION INTRAMUSCULAR; INTRAVENOUS; SUBCUTANEOUS
Status: DISCONTINUED | OUTPATIENT
Start: 2021-01-29 | End: 2021-01-30 | Stop reason: HOSPADM

## 2021-01-29 RX ORDER — SODIUM CHLORIDE, SODIUM LACTATE, POTASSIUM CHLORIDE, CALCIUM CHLORIDE 600; 310; 30; 20 MG/100ML; MG/100ML; MG/100ML; MG/100ML
INJECTION, SOLUTION INTRAVENOUS CONTINUOUS
Status: DISCONTINUED | OUTPATIENT
Start: 2021-01-29 | End: 2021-01-29 | Stop reason: HOSPADM

## 2021-01-29 RX ORDER — KETOROLAC TROMETHAMINE 30 MG/ML
30 INJECTION, SOLUTION INTRAMUSCULAR; INTRAVENOUS EVERY 6 HOURS PRN
Status: DISCONTINUED | OUTPATIENT
Start: 2021-01-29 | End: 2021-01-30 | Stop reason: HOSPADM

## 2021-01-29 RX ORDER — PROPOFOL 10 MG/ML
INJECTION, EMULSION INTRAVENOUS CONTINUOUS PRN
Status: DISCONTINUED | OUTPATIENT
Start: 2021-01-29 | End: 2021-01-29

## 2021-01-29 RX ORDER — ONDANSETRON 4 MG/1
4 TABLET, ORALLY DISINTEGRATING ORAL EVERY 30 MIN PRN
Status: DISCONTINUED | OUTPATIENT
Start: 2021-01-29 | End: 2021-01-30 | Stop reason: HOSPADM

## 2021-01-29 RX ORDER — MEPERIDINE HYDROCHLORIDE 25 MG/ML
12.5 INJECTION INTRAMUSCULAR; INTRAVENOUS; SUBCUTANEOUS
Status: DISCONTINUED | OUTPATIENT
Start: 2021-01-29 | End: 2021-01-30 | Stop reason: HOSPADM

## 2021-01-29 RX ORDER — LIDOCAINE HYDROCHLORIDE 20 MG/ML
INJECTION, SOLUTION INFILTRATION; PERINEURAL PRN
Status: DISCONTINUED | OUTPATIENT
Start: 2021-01-29 | End: 2021-01-29

## 2021-01-29 RX ORDER — OXYCODONE HYDROCHLORIDE 5 MG/1
5 TABLET ORAL EVERY 4 HOURS PRN
Status: DISCONTINUED | OUTPATIENT
Start: 2021-01-29 | End: 2021-01-30 | Stop reason: HOSPADM

## 2021-01-29 RX ORDER — ONDANSETRON 2 MG/ML
4 INJECTION INTRAMUSCULAR; INTRAVENOUS EVERY 30 MIN PRN
Status: DISCONTINUED | OUTPATIENT
Start: 2021-01-29 | End: 2021-01-30 | Stop reason: HOSPADM

## 2021-01-29 RX ORDER — FENTANYL CITRATE 50 UG/ML
INJECTION, SOLUTION INTRAMUSCULAR; INTRAVENOUS PRN
Status: DISCONTINUED | OUTPATIENT
Start: 2021-01-29 | End: 2021-01-29

## 2021-01-29 RX ORDER — GLYCOPYRROLATE 0.2 MG/ML
INJECTION, SOLUTION INTRAMUSCULAR; INTRAVENOUS PRN
Status: DISCONTINUED | OUTPATIENT
Start: 2021-01-29 | End: 2021-01-29

## 2021-01-29 RX ORDER — GABAPENTIN 300 MG/1
300 CAPSULE ORAL ONCE
Status: COMPLETED | OUTPATIENT
Start: 2021-01-29 | End: 2021-01-29

## 2021-01-29 RX ORDER — EPHEDRINE SULFATE 50 MG/ML
INJECTION, SOLUTION INTRAMUSCULAR; INTRAVENOUS; SUBCUTANEOUS PRN
Status: DISCONTINUED | OUTPATIENT
Start: 2021-01-29 | End: 2021-01-29

## 2021-01-29 RX ORDER — FENTANYL CITRATE 50 UG/ML
25-50 INJECTION, SOLUTION INTRAMUSCULAR; INTRAVENOUS
Status: DISCONTINUED | OUTPATIENT
Start: 2021-01-29 | End: 2021-01-29 | Stop reason: HOSPADM

## 2021-01-29 RX ADMIN — AMPICILLIN 2 G: 2 INJECTION, POWDER, FOR SOLUTION INTRAVENOUS at 10:11

## 2021-01-29 RX ADMIN — GABAPENTIN 300 MG: 300 CAPSULE ORAL at 09:38

## 2021-01-29 RX ADMIN — SODIUM CHLORIDE, SODIUM LACTATE, POTASSIUM CHLORIDE, CALCIUM CHLORIDE: 600; 310; 30; 20 INJECTION, SOLUTION INTRAVENOUS at 09:38

## 2021-01-29 RX ADMIN — ACETAMINOPHEN 975 MG: 325 TABLET ORAL at 09:38

## 2021-01-29 RX ADMIN — ONDANSETRON 4 MG: 2 INJECTION INTRAMUSCULAR; INTRAVENOUS at 10:19

## 2021-01-29 RX ADMIN — GENTAMICIN SULFATE 100 MG: 100 INJECTION, SOLUTION INTRAVENOUS at 09:38

## 2021-01-29 RX ADMIN — LIDOCAINE HYDROCHLORIDE 80 MG: 20 INJECTION, SOLUTION INFILTRATION; PERINEURAL at 10:06

## 2021-01-29 RX ADMIN — PROPOFOL 150 MCG/KG/MIN: 10 INJECTION, EMULSION INTRAVENOUS at 10:06

## 2021-01-29 RX ADMIN — GLYCOPYRROLATE 0.2 MG: 0.2 INJECTION, SOLUTION INTRAMUSCULAR; INTRAVENOUS at 10:02

## 2021-01-29 RX ADMIN — FENTANYL CITRATE 50 MCG: 50 INJECTION, SOLUTION INTRAMUSCULAR; INTRAVENOUS at 10:12

## 2021-01-29 RX ADMIN — PROPOFOL 200 MG: 10 INJECTION, EMULSION INTRAVENOUS at 10:06

## 2021-01-29 RX ADMIN — EPHEDRINE SULFATE 5 MG: 50 INJECTION, SOLUTION INTRAMUSCULAR; INTRAVENOUS; SUBCUTANEOUS at 10:30

## 2021-01-29 ASSESSMENT — MIFFLIN-ST. JEOR: SCORE: 2084.31

## 2021-01-29 NOTE — DISCHARGE INSTRUCTIONS
[facility]  Home Care Following Anesthesia  For 24 hours after surgery:  1. Get plenty of rest.  A responsible adult must stay with you for at least 24 hours after you leave the surgery center.  2. Do not drive or use heavy equipment.  If you have weakness or tingling, don't drive or use heavy equipment until this feeling goes away.   3. Do not drink alcohol.   4. Avoid strenuous or risky activities.  Ask for help when climbing stairs.  5. You may feel lightheaded.  IF so, sit for a few minutes before standing.  Have someone help you get up.   6. If you have nausea (feel sick to your stomach): Drink only clear liquids such as apple juice, ginger ale, broth or 7-Up.  Rest may also help.  Be sure to drink enough fluids.  Move to a regular diet as you feel able.   7. You may have a slight fever.  Call the doctor if your fever is over 100 F (37.7 C) (taken under the tongue) or lasts longer than 24 hours.  8. You may have a dry mouth, a sore throat, muscle aches or trouble sleeping. These should go away after 24 hours.  9. Do not make important or legal decisions.               Tips for taking pain medications  To get the best pain relief possible, remember these points:    Take pain medications as directed, before pain becomes severe.    Pain medication can upset your stomach: taking it with food may help.    Constipation is a common side effect of pain medication. Drink plenty of  fluids.    Eat foods high in fiber. Take a stool softener if recommended by your doctor or pharmacist.    Do not drink alcohol, drive or operate machinery while taking pain medications.    Ask about other ways to control pain, such as with heat, ice or relaxation.    Tylenol/Acetaminophen Consumption  To help encourage the safe use of acetaminophen, the makers of TYLENOL  have lowered the maximum daily dose for single-ingredient Extra Strength TYLENOL  (acetaminophen) products sold in the [place] from 8  pills per day (4,000 mg) to 6 pills per day (3,000 mg). The dosing interval has also changed from 2 pills every 4-6 hours to 2 pills every 6 hours.    If you feel your pain relief is insufficient, you may take Tylenol/Acetaminophen in addition to your narcotic pain medication.     Be careful not to exceed 3,000 mg of Tylenol/Acetaminophen in a 24 hour period from all sources.    If you are taking extra strength Tylenol/acetaminophen (500 mg), the maximum dose is 6 tablets in 24 hours.    If you are taking regular strength acetaminophen (325 mg), the maximum dose is 9 tablets in 24 hours.    Tylenol 975 mg given at 9:38AM.  Next dose available at 3:38PM, then follow package directions.    Call a doctor for any of the followin. Signs of infection (fever, growing tenderness at the surgery site, a large amount of drainage or bleeding, severe pain, foul-smelling drainage, redness, swelling).  2. It has been over 8 to 10 hours since surgery and you are still not able to urinate (pass water).  3. Headache for over 24 hours.  4. Signs of Covid-19 infection (temperature over 100 degrees, shortness of breath, cough, loss of taste/smell, generalized body aches, persistent headache, chills, sore throat, nausea/vomiting/diarrhea)  Your doctor is:  Dr. Joel Merino, Prostate and Urology: [phone]                 Or dial [phone] and ask for the resident on call for:  Prostate Urology  For emergency care, call the:  [facility]:  [phone] (TTY for hearing impaired: [phone])

## 2021-01-29 NOTE — ANESTHESIA POSTPROCEDURE EVALUATION
Patient: Olu Trinh    Procedure(s):  CYSTOSCOPY, VIA MITROFANOFF    Diagnosis:Bladder stone [N21.0]  Diagnosis Additional Information: No value filed.    Anesthesia Type:  General    Note:  Disposition: Outpatient   Postop Pain Control: Uneventful            Sign Out: Well controlled pain   PONV: No   Neuro/Psych: Uneventful            Sign Out: Acceptable/Baseline neuro status   Airway/Respiratory: Uneventful            Sign Out: Acceptable/Baseline resp. status   CV/Hemodynamics: Uneventful            Sign Out: Acceptable CV status   Other NRE: NONE   DID A NON-ROUTINE EVENT OCCUR? No         Last vitals:  Vitals:    01/29/21 1048 01/29/21 1100 01/29/21 1115   BP: 109/66 113/79 114/60   Pulse: 64 62    Resp: 15 11 16   Temp: 35.9  C (96.7  F) 36.1  C (96.9  F) 36.4  C (97.5  F)   SpO2: 100% 100% 100%       Electronically Signed By: Evan Singh MD, MD  January 29, 2021  11:42 AM

## 2021-01-29 NOTE — ANESTHESIA CARE TRANSFER NOTE
Patient: Olu Trinh    Procedure(s):  CYSTOSCOPY, VIA MITROFANOFF    Diagnosis: Bladder stone [N21.0]  Diagnosis Additional Information: No value filed.    Anesthesia Type:   General     Note:    Oropharynx: spontaneously breathing  Level of Consciousness: awake  Oxygen Supplementation: face mask    Independent Airway: airway patency satisfactory and stable  Dentition: dentition unchanged  Vital Signs Stable: post-procedure vital signs reviewed and stable  Report to RN Given: handoff report given  Patient transferred to: PACU  Comments: 109/66  100%  96.6-54-12    Handoff Report: Identifed the Patient, Identified the Reponsible Provider, Reviewed the pertinent medical history, Discussed the surgical course, Reviewed Intra-OP anesthesia mangement and issues during anesthesia, Set expectations for post-procedure period and Allowed opportunity for questions and acknowledgement of understanding      Vitals: (Last set prior to Anesthesia Care Transfer)  CRNA VITALS  1/29/2021 1016 - 1/29/2021 1052      1/29/2021             Pulse:  57    SpO2:  98 %    Resp Rate (set):  10        Electronically Signed By: PATRICIA Diaz CRNA  January 29, 2021  10:52 AM

## 2021-01-29 NOTE — OP NOTE
OPERATIVE REPORT    PREOPERATIVE DIAGNOSIS: Bladder stone    POSTOPERATIVE DIAGNOSIS:  Negative cystosopy    PROCEDURES PERFORMED:   1. Cystoscopy via Mitrofanoff channel    STAFF SURGEON: Joel Blunt MD    RESIDENT(S): Kian Wilkins MD; Gatito Mantilla MD    ANESTHESIA: General    ESTIMATED BLOOD LOSS: 0 mL.     DRAINS:  None    SIGNIFICANT FINDINGS: NO BLADDER STONE IDENTIFIED; calcification seen on most recent CT is also seen on prior imaging and may represent calcification within the bladder wall or bladder neck closure    OPERATIVE INDICATIONS:   Olu Trinh is a 35 year old male hx of bladder/cloacal extrophy who presented with concern for ~ 1 cm bladder stone from outside imaging. He has hx of prior percutaneous bladder stone surgery in 2015 with Dr. Blunt and most recently stone removal via his channel with Dr. Ibrahim in 2018. The patient was counseled on the alternatives, risks, and benefits and elected to proceed with the above stated procedure.    DESCRIPTION OF PROCEDURE:    After informed consent was obtained, the patient was taken to the operating room, and moved to the operating table in supine position. After adequate anesthesia was induced, the patient was prepped and draped in the usual sterile fashion. A timeout was taken to confirm correct patient, procedure and laterality.     A well lubricated 16-Spanish cystoscope was inserted via the patient's Mitrofanoff channel.  The scope was advanced into the bladder augmentation as well as the native bladder.  Thorough 360 degree cystoscopy was performed; however, no bladder stone was identified.  There did appear to be potential calcifications within the bladder wall located near the patient's prior bladder neck closure.  After a thorough cystoscopy was then repeated and no stone was seen, the scope was removed and the procedure was concluded.  A 14 Spanish catheter was placed via the Mitrofanoff channel and the patient's bladder was  drained.    The patient tolerated the procedure well. There were no complications. He was awoken from anesthesia and transferred to PACU in stable condition.     PLAN:   - Discharge home once PACU criteria are met  - Follow up in 6 months for repeat cystoscopy to evaluate for bladder stones

## 2021-01-29 NOTE — BRIEF OP NOTE
[facility]    Brief Operative Note    Pre-operative diagnosis: Bladder stone [N21.0]  Post-operative diagnosis Negative cystoscopy    Procedure: Procedure(s):  CYSTOSCOPY, VIA MITROFANOFF  Surgeon: Surgeon(s) and Role:     * Joel Blunt MD - Primary     * Kian Wilkins MD - Resident - Assisting  Anesthesia: General   Estimated blood loss: None  Drains: None  Specimens: * No specimens in log *  Findings:   No bladder stones visualized; possibl evidence of calcification within bladder wall near bladder neck closure.  Complications: None.  Implants: * No implants in log *

## 2021-02-11 ENCOUNTER — TELEPHONE (OUTPATIENT)
Dept: UROLOGY | Facility: CLINIC | Age: 36
End: 2021-02-11

## 2021-02-11 NOTE — TELEPHONE ENCOUNTER
Pt called and notified that if he has an increase in urinary symptoms we would want a urine sample.    Ester Robbins CMA  02/11/21  9:22 AM      ----- Message from Juliet Curran sent at 2/11/2021  8:32 AM CST -----  Patient was wondering if he need to drop off a urine sample a few days before his cystoscopy?  ----- Message -----  From: Manjit Mantilla MD  Sent: 1/29/2021  10:41 AM CST  To: Clinic Coordinators-U[facility]    Summer,  Can Olu Trinh's current follow-up appointments and ultrasound in March and May be cancelled and can he be scheduled for a cystoscopy with Dr. Blunt in 6 months? Thank you!    Best,Gatito

## 2021-05-17 DIAGNOSIS — N39.0 URINARY TRACT INFECTION: Primary | ICD-10-CM

## 2021-05-29 ENCOUNTER — MYC MEDICAL ADVICE (OUTPATIENT)
Dept: UROLOGY | Facility: CLINIC | Age: 36
End: 2021-05-29

## 2021-06-03 ENCOUNTER — ANCILLARY PROCEDURE (OUTPATIENT)
Dept: ULTRASOUND IMAGING | Facility: CLINIC | Age: 36
End: 2021-06-03
Attending: UROLOGY
Payer: COMMERCIAL

## 2021-06-03 DIAGNOSIS — N31.9 NEUROGENIC BLADDER: ICD-10-CM

## 2021-06-03 DIAGNOSIS — N39.0 URINARY TRACT INFECTION: ICD-10-CM

## 2021-06-03 LAB
ALBUMIN UR-MCNC: 100 MG/DL
APPEARANCE UR: ABNORMAL
BILIRUB UR QL STRIP: NEGATIVE
COLOR UR AUTO: YELLOW
CREAT SERPL-MCNC: 0.92 MG/DL (ref 0.66–1.25)
GFR SERPL CREATININE-BSD FRML MDRD: >90 ML/MIN/{1.73_M2}
GLUCOSE UR STRIP-MCNC: NEGATIVE MG/DL
HGB UR QL STRIP: NEGATIVE
KETONES UR STRIP-MCNC: NEGATIVE MG/DL
LEUKOCYTE ESTERASE UR QL STRIP: ABNORMAL
MUCOUS THREADS #/AREA URNS LPF: PRESENT /LPF
NITRATE UR QL: NEGATIVE
PH UR STRIP: 6 PH (ref 5–7)
RBC #/AREA URNS AUTO: 7 /HPF (ref 0–2)
SOURCE: ABNORMAL
SP GR UR STRIP: 1.01 (ref 1–1.03)
TRANS CELLS #/AREA URNS HPF: 1 /HPF
UROBILINOGEN UR STRIP-MCNC: 0 MG/DL (ref 0–2)
WBC #/AREA URNS AUTO: 51 /HPF (ref 0–5)

## 2021-06-03 PROCEDURE — 76770 US EXAM ABDO BACK WALL COMP: CPT | Performed by: RADIOLOGY

## 2021-06-03 PROCEDURE — 87086 URINE CULTURE/COLONY COUNT: CPT | Mod: 90 | Performed by: PATHOLOGY

## 2021-06-03 PROCEDURE — 87088 URINE BACTERIA CULTURE: CPT | Mod: 90 | Performed by: PATHOLOGY

## 2021-06-03 PROCEDURE — 81001 URINALYSIS AUTO W/SCOPE: CPT | Performed by: PATHOLOGY

## 2021-06-03 PROCEDURE — 36415 COLL VENOUS BLD VENIPUNCTURE: CPT | Performed by: PATHOLOGY

## 2021-06-03 PROCEDURE — 99000 SPECIMEN HANDLING OFFICE-LAB: CPT | Performed by: PATHOLOGY

## 2021-06-03 PROCEDURE — 82565 ASSAY OF CREATININE: CPT | Performed by: PATHOLOGY

## 2021-06-03 PROCEDURE — 87186 SC STD MICRODIL/AGAR DIL: CPT | Mod: 90 | Performed by: PATHOLOGY

## 2021-06-04 NOTE — PROGRESS NOTES
Left message for patient to call writer back on direct line to discuss catheter concerns.  Carole Burr RN

## 2021-06-06 LAB
BACTERIA SPEC CULT: ABNORMAL
Lab: ABNORMAL
SPECIMEN SOURCE: ABNORMAL

## 2021-06-07 NOTE — RESULT ENCOUNTER NOTE
"Patient informed of Ultra sound results, no changes.  Patient states August appointment is \"good\".  Catheter issue is resolved in ED.  Denies other questions or concerns.  Carole Burr RN  "

## 2021-06-11 NOTE — RESULT ENCOUNTER NOTE
Patient is asymptomatic. No need for treatment at this time.  Patient will contact clinic if he develops symptoms.  Carole Burr RN

## 2021-06-25 DIAGNOSIS — N39.0 URINARY TRACT INFECTION: ICD-10-CM

## 2021-06-25 DIAGNOSIS — N31.9 NEUROGENIC BLADDER: ICD-10-CM

## 2021-06-25 LAB
ALBUMIN UR-MCNC: 100 MG/DL
APPEARANCE UR: ABNORMAL
BACTERIA #/AREA URNS HPF: ABNORMAL /HPF
BILIRUB UR QL STRIP: NEGATIVE
COLOR UR AUTO: YELLOW
GLUCOSE UR STRIP-MCNC: NEGATIVE MG/DL
HGB UR QL STRIP: NEGATIVE
KETONES UR STRIP-MCNC: NEGATIVE MG/DL
LEUKOCYTE ESTERASE UR QL STRIP: ABNORMAL
NITRATE UR QL: NEGATIVE
PH UR STRIP: 6 PH (ref 5–7)
RBC #/AREA URNS AUTO: 5 /HPF (ref 0–2)
SOURCE: ABNORMAL
SP GR UR STRIP: 1.01 (ref 1–1.03)
SQUAMOUS #/AREA URNS AUTO: <1 /HPF (ref 0–1)
UROBILINOGEN UR STRIP-MCNC: 0 MG/DL (ref 0–2)
WBC #/AREA URNS AUTO: >182 /HPF (ref 0–5)
WBC CLUMPS #/AREA URNS HPF: PRESENT /HPF

## 2021-06-25 PROCEDURE — 99000 SPECIMEN HANDLING OFFICE-LAB: CPT | Performed by: PATHOLOGY

## 2021-06-25 PROCEDURE — 81001 URINALYSIS AUTO W/SCOPE: CPT | Performed by: PATHOLOGY

## 2021-06-25 PROCEDURE — 87088 URINE BACTERIA CULTURE: CPT | Mod: 90 | Performed by: PATHOLOGY

## 2021-06-25 PROCEDURE — 87086 URINE CULTURE/COLONY COUNT: CPT | Mod: 90 | Performed by: PATHOLOGY

## 2021-06-25 PROCEDURE — 87186 SC STD MICRODIL/AGAR DIL: CPT | Mod: 90 | Performed by: PATHOLOGY

## 2021-06-27 LAB
BACTERIA SPEC CULT: ABNORMAL
Lab: ABNORMAL
SPECIMEN SOURCE: ABNORMAL

## 2021-06-29 NOTE — RESULT ENCOUNTER NOTE
Patient has a history of seizures,complains of sudden onset of seizure activity with onset of UTI past and present, denies other systemic symptoms. Afebrile.  Prescription sent to pharmacy.  Carole Burr RN

## 2021-07-03 ENCOUNTER — HEALTH MAINTENANCE LETTER (OUTPATIENT)
Age: 36
End: 2021-07-03

## 2021-07-19 ENCOUNTER — LAB (OUTPATIENT)
Dept: LAB | Facility: CLINIC | Age: 36
End: 2021-07-19
Payer: COMMERCIAL

## 2021-07-19 DIAGNOSIS — N39.0 URINARY TRACT INFECTION: ICD-10-CM

## 2021-07-19 LAB
ALBUMIN UR-MCNC: 100 MG/DL
APPEARANCE UR: ABNORMAL
BACTERIA #/AREA URNS HPF: ABNORMAL /HPF
BILIRUB UR QL STRIP: NEGATIVE
COLOR UR AUTO: YELLOW
GLUCOSE UR STRIP-MCNC: NEGATIVE MG/DL
HGB UR QL STRIP: ABNORMAL
KETONES UR STRIP-MCNC: NEGATIVE MG/DL
LEUKOCYTE ESTERASE UR QL STRIP: ABNORMAL
MUCOUS THREADS #/AREA URNS LPF: PRESENT /LPF
NITRATE UR QL: NEGATIVE
PH UR STRIP: 6 [PH] (ref 5–7)
RBC URINE: 8 /HPF
SP GR UR STRIP: 1.01 (ref 1–1.03)
UROBILINOGEN UR STRIP-MCNC: NORMAL MG/DL
WBC URINE: 79 /HPF

## 2021-07-19 PROCEDURE — 87086 URINE CULTURE/COLONY COUNT: CPT | Mod: 90 | Performed by: PATHOLOGY

## 2021-07-19 PROCEDURE — 81001 URINALYSIS AUTO W/SCOPE: CPT | Performed by: PATHOLOGY

## 2021-07-20 LAB — BACTERIA UR CULT: NORMAL

## 2021-07-27 ENCOUNTER — PRE VISIT (OUTPATIENT)
Dept: UROLOGY | Facility: CLINIC | Age: 36
End: 2021-07-27

## 2021-07-27 NOTE — TELEPHONE ENCOUNTER
[facility]     Relevant information: neurogenic bladder, bladder stones    Problem list   Patient Active Problem List   Diagnosis     Attention deficit hyperactivity disorder (ADHD)     Neurogenic bladder     Other hydronephrosis     Bladder stone     Exstrophy of bladder sequence     Intractable epilepsy without status epilepticus, unspecified epilepsy type (H)       Records/imaging/labs/orders: all records available    Pt called: no need for a call    At Rooming: prep abdomen, no uro-jet, stone basket, straight intermittent catheters 14-18

## 2021-08-05 ENCOUNTER — LAB REQUISITION (OUTPATIENT)
Dept: LAB | Facility: CLINIC | Age: 36
End: 2021-08-05

## 2021-08-05 LAB — HBV SURFACE AB SERPL IA-ACNC: 1.17 M[IU]/ML

## 2021-08-05 PROCEDURE — 86706 HEP B SURFACE ANTIBODY: CPT | Performed by: INTERNAL MEDICINE

## 2021-08-05 PROCEDURE — 86481 TB AG RESPONSE T-CELL SUSP: CPT | Performed by: INTERNAL MEDICINE

## 2021-08-05 PROCEDURE — 86787 VARICELLA-ZOSTER ANTIBODY: CPT | Performed by: INTERNAL MEDICINE

## 2021-08-06 LAB
GAMMA INTERFERON BACKGROUND BLD IA-ACNC: 0.01 IU/ML
M TB IFN-G BLD-IMP: NEGATIVE
M TB IFN-G CD4+ BCKGRND COR BLD-ACNC: 9.99 IU/ML
MITOGEN IGNF BCKGRD COR BLD-ACNC: 0.03 IU/ML
MITOGEN IGNF BCKGRD COR BLD-ACNC: 0.06 IU/ML
QUANTIFERON MITOGEN: 10 IU/ML
QUANTIFERON NIL TUBE: 0.01 IU/ML
QUANTIFERON TB1 TUBE: 0.07 IU/ML
QUANTIFERON TB2 TUBE: 0.04
VZV IGG SER QL IA: >4000 INDEX
VZV IGG SER QL IA: POSITIVE

## 2021-08-30 ENCOUNTER — OFFICE VISIT (OUTPATIENT)
Dept: UROLOGY | Facility: CLINIC | Age: 36
End: 2021-08-30
Payer: COMMERCIAL

## 2021-08-30 VITALS
BODY MASS INDEX: 33.86 KG/M2 | SYSTOLIC BLOOD PRESSURE: 140 MMHG | HEART RATE: 56 BPM | HEIGHT: 72 IN | DIASTOLIC BLOOD PRESSURE: 91 MMHG | WEIGHT: 250 LBS

## 2021-08-30 DIAGNOSIS — Q64.10 EXSTROPHY OF BLADDER SEQUENCE: ICD-10-CM

## 2021-08-30 DIAGNOSIS — N21.0 BLADDER STONE: ICD-10-CM

## 2021-08-30 PROCEDURE — 52310 CYSTOSCOPY AND TREATMENT: CPT | Performed by: UROLOGY

## 2021-08-30 ASSESSMENT — MIFFLIN-ST. JEOR: SCORE: 2101.99

## 2021-08-30 ASSESSMENT — PAIN SCALES - GENERAL: PAINLEVEL: NO PAIN (0)

## 2021-08-30 NOTE — PATIENT INSTRUCTIONS
"Follow up in six months for a cystoscopy to evaluate for stones.     It was a pleasure meeting with you today.  Thank you for allowing me and my team the privilege of caring for you today.  YOU are the reason we are here, and I truly hope we provided you with the excellent service you deserve.  Please let us know if there is anything else we can do for you so that we can be sure you are leaving completely satisfied with your care experience.        Ester Robbins CMA    AFTER YOUR CYSTOSCOPY        You have just completed a cystoscopy, or \"cysto\", which allowed your physician to learn more about your bladder (or to remove a stent placed after surgery). We suggest that you continue to avoid caffeine, fruit juice, and alcohol for the next 24 hours, however, you are encouraged to return to your normal activities.         A few things that are considered normal after your cystoscopy:     * Small amount of bleeding (or spotting) that clears within the next 24 hours     * Slight burning sensation with urination     * Sensation to of needing to avoid more frequently     * The feeling of \"air\" in your urine     * Mild discomfort that is relieved with Tylenol        Please contact our office promptly if you:     * Develop a fever above 101 degrees     * Are unable to urinate     * Develop bright red blood that does not stop     * Severe pain or swelling         Please contact our office with any concerns or questions @DEPHN.  "

## 2021-08-30 NOTE — NURSING NOTE
Chief Complaint   Patient presents with     Cystoscopy     neurogenic bladder, bladder stones       Blood pressure (!) 140/91, pulse 56, height 1.829 m (6'), weight 113.4 kg (250 lb). Body mass index is 33.91 kg/m .    Patient Active Problem List   Diagnosis     Attention deficit hyperactivity disorder (ADHD)     Neurogenic bladder     Other hydronephrosis     Bladder stone     Exstrophy of bladder sequence     Intractable epilepsy without status epilepticus, unspecified epilepsy type (H)       Allergies   Allergen Reactions     Sulfa Drugs      unknown       Current Outpatient Medications   Medication Sig Dispense Refill     levETIRAcetam (KEPPRA XR) 500 MG 24 hr tablet Take 500 mg by mouth daily Takes 4 tablet every am       ibuprofen (ADVIL/MOTRIN) 200 MG capsule Take 200 mg by mouth every 4 hours as needed for fever (Patient not taking: Reported on 2021)         Social History     Tobacco Use     Smoking status: Never Smoker     Smokeless tobacco: Never Used   Substance Use Topics     Alcohol use: Yes     Comment: Alcoholic beverage once or twice per week.     Drug use: No       Invasive Procedure Safety Checklist:    Procedure: Cystoscopy    Action: Complete sections and checkboxes as appropriate.    Pre-procedure:  1. Patient ID Verified with 2 identifiers (Yaa and  or MRN) : YES    2. Procedure and site verified with patient/designee (when able) : YES    3. Accurate consent documentation in medical record : YES    4. H&P (or appropriate assessment) documented in medical record : N/A  H&P must be up to 30 days prior to procedure an updated within 24 hours of                 Procedure as applicable.     5. Relevant diagnostic and radiology test results appropriately labeled and displayed as applicable : YES    6. Blood products, implants, devices, and/or special equipment available for the procedure as applicable : YES    7. Procedure site(s) marked with provider initials [Exclusions: none] : NO    8.  Marking not required. Reason : Yes  Procedure does not require site marking    Time Out:     Time-Out performed immediately prior to starting procedure, including verbal and active participation of all team members addressing: YES    1. Correct patient identity.  2. Confirmed that the correct side and site are marked.  3. An accurate procedure to be done.  4. Agreement on the procedure to be done.  5. Correct patient position.  6. Relevant images and results are properly labeled and appropriately displayed.  7. The need to administer antibiotics or fluids for irrigation purposes during the procedure as applicable.  8. Safety precautions based on patient history or medication use.    During Procedure: Verification of correct person, site, and procedure occurs any time the responsibility for care of the patient is transferred to another member of the care team.      Chris Brothers  8/30/2021  2:41 PM

## 2021-08-30 NOTE — PROGRESS NOTES
Male Cystoscopy Procedure Note     PRE-PROCEDURE DIAGNOSIS: bladder exstrophy, bladder stone, blood   POST-PROCEDURE DIAGNOSIS: same  PROCEDURE:   1.cystoscopy via Mitrofanoff and removal of bladder stone  2. Urethroscopy via penile urethra    HISTORY: Olu Trinh is a 36 year old man with hx of bladder/cloacal extrophy who underwent prior bladder stone removal. Recent imaging suggest a new stone has developed.  He has hx of prior percutaneous bladder stone surgery in 2015 with Dr. Blunt and most recently stone removal via his channel with Dr. Ibrahim in 2018. Hd neg cysto with me last year. At that time we thought the calcification see on CT might be extravesical -- perhaps a calcified prostate remnant.    REVIEW OF OFFICE STUDIES:    CT again shows this stone near BN    DESCRIPTION OF PROCEDURE:  After informed consent was obtained, the patient was brought to the procedure room where he was placed in the supine position with all pressure points well padded.  The abdomen, penis and scrotum were prepped and draped in a sterile fashion. A flexible cystoscope was introduced through a well-lubricated stoma. The stoma was normal in appearance. The bladder was mostly made of augment with a small plate of normal bladder. At the bladder neck there was a 1cm stone mixed with mucous. We were able to irrigate this out with about 5-10 minutes of directed aspiration with the scope.     The scope was then placed through the urethral meatus and up to the proximal end of the urethra at the distal face of the BN closure because the patient said her recently had blood per urethra even though he does not void. This was negative.     ASSESSMENT AND PLAN:  36 year old man with recurrent mucous and stone in floor of bladder . Plan another cysto in 6 months for surveillance and to evacuate any recurrence before it gets too large.

## 2021-08-30 NOTE — LETTER
8/30/2021       RE: Olu Trinh  [address]  [facility] [address]     Dear Colleague,    Thank you for referring your patient, Olu Trinh, to the [facility]OLOG[facility] [place] at [facility]. Please see a copy of my visit note below.    Male Cystoscopy Procedure Note     PRE-PROCEDURE DIAGNOSIS: bladder exstrophy, bladder stone, blood   POST-PROCEDURE DIAGNOSIS: same  PROCEDURE:   1.cystoscopy via Mitrofanoff and removal of bladder stone  2. Urethroscopy via penile urethra    HISTORY: Olu Trinh is a 36 year old man with hx of bladder/cloacal extrophy who underwent prior bladder stone removal. Recent imaging suggest a new stone has developed.  He has hx of prior percutaneous bladder stone surgery in 2015 with Dr. Blunt and most recently stone removal via his channel with Dr. Ibrahim in 2018. Hd neg cysto with me last year. At that time we thought the calcification see on CT might be extravesical -- perhaps a calcified prostate remnant.    REVIEW OF OFFICE STUDIES:    CT again shows this stone near BN    DESCRIPTION OF PROCEDURE:  After informed consent was obtained, the patient was brought to the procedure room where he was placed in the supine position with all pressure points well padded.  The abdomen, penis and scrotum were prepped and draped in a sterile fashion. A flexible cystoscope was introduced through a well-lubricated stoma. The stoma was normal in appearance. The bladder was mostly made of augment with a small plate of normal bladder. At the bladder neck there was a 1cm stone mixed with mucous. We were able to irrigate this out with about 5-10 minutes of directed aspiration with the scope.     The scope was then placed through the urethral meatus and up to the proximal end of the urethra at the distal face of the BN closure because the patient said her recently had blood per urethra even though he  does not void. This was negative.     ASSESSMENT AND PLAN:  36 year old man with recurrent mucous and stone in floor of bladder . Plan another cysto in 6 months for surveillance and to evacuate any recurrence before it gets too large.     Again, thank you for allowing me to participate in the care of your patient.      Sincerely,    Joel Blunt MD

## 2021-09-21 ENCOUNTER — LAB (OUTPATIENT)
Dept: LAB | Facility: CLINIC | Age: 36
End: 2021-09-21
Payer: COMMERCIAL

## 2021-09-21 DIAGNOSIS — N39.0 URINARY TRACT INFECTION: ICD-10-CM

## 2021-09-21 LAB
ALBUMIN UR-MCNC: 100 MG/DL
APPEARANCE UR: ABNORMAL
BACTERIA #/AREA URNS HPF: ABNORMAL /HPF
BILIRUB UR QL STRIP: NEGATIVE
COLOR UR AUTO: YELLOW
GLUCOSE UR STRIP-MCNC: NEGATIVE MG/DL
HGB UR QL STRIP: ABNORMAL
HYALINE CASTS: 2 /LPF
KETONES UR STRIP-MCNC: 5 MG/DL
LEUKOCYTE ESTERASE UR QL STRIP: ABNORMAL
MUCOUS THREADS #/AREA URNS LPF: PRESENT /LPF
NITRATE UR QL: NEGATIVE
PH UR STRIP: 6 [PH] (ref 5–7)
RBC URINE: 9 /HPF
SP GR UR STRIP: 1.01 (ref 1–1.03)
UROBILINOGEN UR STRIP-MCNC: NORMAL MG/DL
WBC CLUMPS #/AREA URNS HPF: PRESENT /HPF
WBC URINE: >182 /HPF

## 2021-09-21 PROCEDURE — 87186 SC STD MICRODIL/AGAR DIL: CPT | Mod: 90 | Performed by: PATHOLOGY

## 2021-09-21 PROCEDURE — 87086 URINE CULTURE/COLONY COUNT: CPT | Mod: 90 | Performed by: PATHOLOGY

## 2021-09-21 PROCEDURE — 81001 URINALYSIS AUTO W/SCOPE: CPT | Performed by: PATHOLOGY

## 2021-09-22 LAB — BACTERIA UR CULT: ABNORMAL

## 2021-09-23 DIAGNOSIS — N39.0 URINARY TRACT INFECTION: Primary | ICD-10-CM

## 2021-09-23 RX ORDER — CIPROFLOXACIN 500 MG/1
500 TABLET, FILM COATED ORAL 2 TIMES DAILY
Qty: 10 TABLET | Refills: 0 | Status: SHIPPED | OUTPATIENT
Start: 2021-09-23 | End: 2021-09-28

## 2021-10-23 ENCOUNTER — HEALTH MAINTENANCE LETTER (OUTPATIENT)
Age: 36
End: 2021-10-23

## 2022-01-05 ENCOUNTER — MYC MEDICAL ADVICE (OUTPATIENT)
Dept: UROLOGY | Facility: CLINIC | Age: 37
End: 2022-01-05
Payer: COMMERCIAL

## 2022-01-07 ENCOUNTER — LAB (OUTPATIENT)
Dept: LAB | Facility: CLINIC | Age: 37
End: 2022-01-07
Payer: COMMERCIAL

## 2022-01-07 DIAGNOSIS — N39.0 URINARY TRACT INFECTION: Primary | ICD-10-CM

## 2022-01-07 DIAGNOSIS — N39.0 URINARY TRACT INFECTION: ICD-10-CM

## 2022-01-07 LAB
ALBUMIN UR-MCNC: 100 MG/DL
APPEARANCE UR: ABNORMAL
BACTERIA #/AREA URNS HPF: ABNORMAL /HPF
BILIRUB UR QL STRIP: NEGATIVE
COLOR UR AUTO: YELLOW
GLUCOSE UR STRIP-MCNC: NEGATIVE MG/DL
HGB UR QL STRIP: NEGATIVE
KETONES UR STRIP-MCNC: NEGATIVE MG/DL
LEUKOCYTE ESTERASE UR QL STRIP: ABNORMAL
MUCOUS THREADS #/AREA URNS LPF: PRESENT /LPF
NITRATE UR QL: NEGATIVE
PH UR STRIP: 6 [PH] (ref 5–7)
RBC URINE: 6 /HPF
SP GR UR STRIP: 1.01 (ref 1–1.03)
SQUAMOUS EPITHELIAL: <1 /HPF
UROBILINOGEN UR STRIP-MCNC: NORMAL MG/DL
WBC CLUMPS #/AREA URNS HPF: PRESENT /HPF
WBC URINE: 150 /HPF

## 2022-01-07 PROCEDURE — 99000 SPECIMEN HANDLING OFFICE-LAB: CPT | Performed by: PATHOLOGY

## 2022-01-07 PROCEDURE — 87086 URINE CULTURE/COLONY COUNT: CPT | Mod: 90 | Performed by: PATHOLOGY

## 2022-01-07 PROCEDURE — 87088 URINE BACTERIA CULTURE: CPT | Mod: 90 | Performed by: PATHOLOGY

## 2022-01-07 PROCEDURE — 87186 SC STD MICRODIL/AGAR DIL: CPT | Mod: 90 | Performed by: PATHOLOGY

## 2022-01-07 PROCEDURE — 81001 URINALYSIS AUTO W/SCOPE: CPT | Performed by: PATHOLOGY

## 2022-01-10 LAB
BACTERIA UR CULT: ABNORMAL

## 2022-02-21 ENCOUNTER — PRE VISIT (OUTPATIENT)
Dept: UROLOGY | Facility: CLINIC | Age: 37
End: 2022-02-21
Payer: COMMERCIAL

## 2022-02-21 NOTE — TELEPHONE ENCOUNTER
Reason for visit: cystoscopy through [facility]            Relevant information: neurogenic bladder, bladder stone      Records/imaging/labs/orders: all records available     Pt called: no need for a call     At Rooming: prep abdomen, no uro-jet, stone basket, straight intermittent catheters 14-18

## 2022-05-11 ENCOUNTER — MYC MEDICAL ADVICE (OUTPATIENT)
Dept: UROLOGY | Facility: CLINIC | Age: 37
End: 2022-05-11
Payer: COMMERCIAL

## 2022-06-07 ENCOUNTER — LAB (OUTPATIENT)
Dept: LAB | Facility: CLINIC | Age: 37
End: 2022-06-07
Payer: COMMERCIAL

## 2022-06-07 ENCOUNTER — NURSE TRIAGE (OUTPATIENT)
Dept: UROLOGY | Facility: CLINIC | Age: 37
End: 2022-06-07
Payer: COMMERCIAL

## 2022-06-07 DIAGNOSIS — N39.0 URINARY TRACT INFECTION: Primary | ICD-10-CM

## 2022-06-07 DIAGNOSIS — N39.0 URINARY TRACT INFECTION: ICD-10-CM

## 2022-06-07 LAB
ALBUMIN UR-MCNC: 100 MG/DL
APPEARANCE UR: ABNORMAL
BACTERIA #/AREA URNS HPF: ABNORMAL /HPF
BILIRUB UR QL STRIP: NEGATIVE
COLOR UR AUTO: YELLOW
GLUCOSE UR STRIP-MCNC: NEGATIVE MG/DL
HGB UR QL STRIP: ABNORMAL
KETONES UR STRIP-MCNC: NEGATIVE MG/DL
LEUKOCYTE ESTERASE UR QL STRIP: ABNORMAL
NITRATE UR QL: POSITIVE
PH UR STRIP: 6 [PH] (ref 5–7)
RBC URINE: 29 /HPF
SP GR UR STRIP: 1.01 (ref 1–1.03)
UROBILINOGEN UR STRIP-MCNC: NORMAL MG/DL
WBC CLUMPS #/AREA URNS HPF: PRESENT /HPF
WBC URINE: >182 /HPF

## 2022-06-07 PROCEDURE — 87186 SC STD MICRODIL/AGAR DIL: CPT | Mod: 90 | Performed by: PATHOLOGY

## 2022-06-07 PROCEDURE — 99000 SPECIMEN HANDLING OFFICE-LAB: CPT | Performed by: PATHOLOGY

## 2022-06-07 PROCEDURE — 81001 URINALYSIS AUTO W/SCOPE: CPT | Performed by: PATHOLOGY

## 2022-06-07 PROCEDURE — 87086 URINE CULTURE/COLONY COUNT: CPT | Mod: 90 | Performed by: PATHOLOGY

## 2022-06-07 NOTE — TELEPHONE ENCOUNTER
Nurse Triage SBAR    Is this a 2nd Level Triage? NO    Situation: Spoke to pt. Pt woke up this morning with pain in penis.     Background: Pt has history of stones in both bladder and kidneys. Pt also has history of UTI's. Pt has mitrofanoff.      Assessment: Pt reports that he had the pain right away when he woke. It is right at the tip. Pain has improved since this morning. Pain at this time is 2-3/10. No redness or swelling. Pt caths every few hours daily. Sometimes will have to pull cath in and out to get it going. Urine appears cloudy. No hematuria. Pt reports drinking adequate amount of water. Pt reports that he feels that he is able to completely empty bladder with cathing. Pt is also having left flank pain, very mild at this time. It was worse this morning that the pt was breathing heavy due to pain/discomfort. Pt irrigated bladder for relief. Did not take any medications for pain. No fever. No pain to mitrofanoff site. Chart and problems list reviewed.    Protocol Recommended Disposition:   See Today In Office    Recommendation: UA/UC .  Tylenol, ibuprofen, and heating pad.   Increase fluid intake.     .    Does the patient meet one of the following criteria for ADS visit consideration? No     Kristin Grant RN MSN    Orders Placed This Encounter   Procedures     Routine UA with microscopic - No culture     Standing Status:   Future     Standing Expiration Date:   6/7/2023     Urine Culture Aerobic Bacterial     Standing Status:   Future     Standing Expiration Date:   6/7/2023         Reason for Disposition    Side (flank) or lower back pain present    Protocols used: URINARY SYMPTOMS-A-OH

## 2022-06-08 LAB — BACTERIA UR CULT: ABNORMAL

## 2022-06-09 ENCOUNTER — PATIENT OUTREACH (OUTPATIENT)
Dept: UROLOGY | Facility: CLINIC | Age: 37
End: 2022-06-09
Payer: COMMERCIAL

## 2022-06-09 DIAGNOSIS — N31.9 NEUROGENIC BLADDER: ICD-10-CM

## 2022-06-09 DIAGNOSIS — Q64.10 EXSTROPHY OF BLADDER SEQUENCE: ICD-10-CM

## 2022-06-09 DIAGNOSIS — N39.0 URINARY TRACT INFECTION: Primary | ICD-10-CM

## 2022-06-09 RX ORDER — NITROFURANTOIN 25; 75 MG/1; MG/1
100 CAPSULE ORAL 2 TIMES DAILY
Qty: 10 CAPSULE | Refills: 0 | Status: SHIPPED | OUTPATIENT
Start: 2022-06-09 | End: 2022-06-14

## 2022-07-20 ENCOUNTER — PRE VISIT (OUTPATIENT)
Dept: UROLOGY | Facility: CLINIC | Age: 37
End: 2022-07-20

## 2022-07-20 NOTE — TELEPHONE ENCOUNTER
Reason for visit: cystoscopy through [facility]            Relevant information: neurogenic bladder, bladder stone      Records/imaging/labs/orders: all records available     Pt called: no need for a call     At Rooming: prep abdomen, no uro-jet, stone basket, straight intermittent catheters 14-18    Ester Robbins CMA  07/20/22  2:26 PM

## 2022-07-30 ENCOUNTER — HEALTH MAINTENANCE LETTER (OUTPATIENT)
Age: 37
End: 2022-07-30

## 2022-08-01 ENCOUNTER — OFFICE VISIT (OUTPATIENT)
Dept: UROLOGY | Facility: CLINIC | Age: 37
End: 2022-08-01
Payer: COMMERCIAL

## 2022-08-01 VITALS
BODY MASS INDEX: 36.57 KG/M2 | HEART RATE: 111 BPM | DIASTOLIC BLOOD PRESSURE: 88 MMHG | HEIGHT: 72 IN | WEIGHT: 270 LBS | SYSTOLIC BLOOD PRESSURE: 133 MMHG

## 2022-08-01 DIAGNOSIS — N21.0 BLADDER STONE: Primary | ICD-10-CM

## 2022-08-01 PROCEDURE — 52000 CYSTOURETHROSCOPY: CPT | Performed by: UROLOGY

## 2022-08-01 RX ORDER — OXCARBAZEPINE 600 MG/1
900 TABLET ORAL DAILY
COMMUNITY
Start: 2022-07-31 | End: 2023-07-30

## 2022-08-01 RX ORDER — CEFAZOLIN SODIUM IN 0.9 % NACL 3 G/100 ML
3 INTRAVENOUS SOLUTION, PIGGYBACK (ML) INTRAVENOUS
Status: CANCELLED | OUTPATIENT
Start: 2022-08-01

## 2022-08-01 RX ORDER — PYRIDOXINE HCL (VITAMIN B6) 100 MG
650 TABLET ORAL
COMMUNITY

## 2022-08-01 RX ORDER — CEFAZOLIN SODIUM IN 0.9 % NACL 3 G/100 ML
3 INTRAVENOUS SOLUTION, PIGGYBACK (ML) INTRAVENOUS SEE ADMIN INSTRUCTIONS
Status: CANCELLED | OUTPATIENT
Start: 2022-08-01

## 2022-08-01 RX ORDER — LORAZEPAM 1 MG/1
TABLET ORAL
Status: ON HOLD | COMMUNITY
Start: 2021-09-21 | End: 2022-11-10

## 2022-08-01 ASSESSMENT — PAIN SCALES - GENERAL: PAINLEVEL: NO PAIN (0)

## 2022-08-01 NOTE — LETTER
8/1/2022       RE: Olu Trinh  [address]  [place] [address]     Dear Colleague,    Thank you for referring your patient, Olu Trinh, to the [facility] [place] at [facility]. Please see a copy of my visit note below.    Male Cystoscopy Procedure Note     PRE-PROCEDURE DIAGNOSIS: bladder exstrophy and h/o bladder stones  POST-PROCEDURE DIAGNOSIS: bladder exstrophy and 1cm bladder stone  PROCEDURE: cystoscopy  HISTORY: Olu Trinh is a 35 year old male with a history of neurogenic bladder secondary to Bladder / cloacal exstrophy.   We removed a bladder stone in 2021. Also had percutaneous bladder stone surgery by Dr. Blunt in 2015 and Patrice in 2018.  He has chronic bilateral hydronephrosis      Previous Bladder Surgeries:  Previous Bladder Augmentation: unknown bowel type in unknown year  Catheterizable stoma:appendiceal Mitrofanoff in unknown year.   Revisions include: none   Anti-incontinence procedures: none  Botox injections: None     Pertinent Medications:  Current Anticholinergics: None  Current Prophylactic antibiotics: None  Intravesical gentamycin:  None  Intravesical oxybutinin: None     Catheterization History:  The patient catheterizes per Mitrofanoff stoma into a augmented bladder with a 14F straight catheter q3-4 hours. Catheterization is performed by  self. The patient uses a new catheter each time. He does not irrigate the bladder. He used catheterize only when he feels full; sometimes he gets flank pain when he feels full but now he does it more regularly. Has also stopped drinking POP per instructions. He does not irrigate vigously, instead just puts in 60cc and lets it drain passively     Incontinence History:  He does not leak between voids/caths. He does not experience urgency of urination.     If patient has an AUS or Sling then:  Leak management: No leak management  necessary      Urinary Tract Infection History:  Treated with antibiotics for positive culture and non-specific symptoms: 0-2 times in the last year  year     Bowel Movement History:  The patient has a bowel movement q1 days. Bowel regimen includes none    DESCRIPTION OF PROCEDURE:  After informed consent was obtained, the patient was brought to the procedure room where he was placed in the supine position with all pressure points well padded.  The catheterizable stoma was prepped and draped in a sterile fashion. A flexible cystoscope was introduced through a well-lubricated channel.  The channel was free of stricture but the course was a little tortuous. The bladder plate was small and most of the bladder was composed of bladder augment. Bladder neck was open. Bladder has a 1cm stone.The flexible cystoscope was removed and the findings were described to the patient.   ASSESSMENT AND PLAN:  37 year old man with 1cm bladder stone. Will schedule for cystoscopic laser lithotripsy with scope through the channel. No need for percutaneous approach.     Then will do office cysto q6 months to evacuate these recurrent stones in the office when they are smaller.                 Again, thank you for allowing me to participate in the care of your patient.      Sincerely,    Joel Blunt MD

## 2022-08-01 NOTE — NURSING NOTE
Chief Complaint   Patient presents with     Cystoscopy     Neurogenic bladder, bladder stones       Blood pressure 133/88, pulse 111, height 1.829 m (6'), weight 122.5 kg (270 lb). Body mass index is 36.62 kg/m .    Patient Active Problem List   Diagnosis     Attention deficit hyperactivity disorder (ADHD)     Neurogenic bladder     Other hydronephrosis     Bladder stone     Exstrophy of bladder sequence     Intractable epilepsy without status epilepticus, unspecified epilepsy type (H)       Allergies   Allergen Reactions     Ondansetron      Other reaction(s): Hallucinations     Sulfa Drugs      unknown       Current Outpatient Medications   Medication Sig Dispense Refill     Cranberry 500 MG CAPS Take 500 mg by mouth       ibuprofen (ADVIL/MOTRIN) 200 MG capsule Take 200 mg by mouth every 4 hours as needed for fever       levETIRAcetam (KEPPRA XR) 500 MG 24 hr tablet Take 500 mg by mouth daily Takes 4 tablet every am       LORazepam (ATIVAN) 1 MG tablet TAKE 1 TABLET BY MOUTH AS NEEDED FOR SEIZURE       OXTELLAR  MG 24 hr tablet          Social History     Tobacco Use     Smoking status: Never Smoker     Smokeless tobacco: Never Used   Substance Use Topics     Alcohol use: Yes     Comment: Alcoholic beverage once or twice per week.     Drug use: No       Invasive Procedure Safety Checklist:    Procedure: Cystoscopy    Action: Complete sections and checkboxes as appropriate.    Pre-procedure:  1. Patient ID Verified with 2 identifiers (Yaa and  or MRN) : YES    2. Procedure and site verified with patient/designee (when able) : YES    3. Accurate consent documentation in medical record : YES    4. H&P (or appropriate assessment) documented in medical record : N/A  H&P must be up to 30 days prior to procedure an updated within 24 hours of                 Procedure as applicable.     5. Relevant diagnostic and radiology test results appropriately labeled and displayed as applicable : YES    6. Blood  products, implants, devices, and/or special equipment available for the procedure as applicable : YES    7. Procedure site(s) marked with provider initials [Exclusions: none] : NO    8. Marking not required. Reason : Yes  Procedure does not require site marking    Time Out:     Time-Out performed immediately prior to starting procedure, including verbal and active participation of all team members addressing: YES    1. Correct patient identity.  2. Confirmed that the correct side and site are marked.  3. An accurate procedure to be done.  4. Agreement on the procedure to be done.  5. Correct patient position.  6. Relevant images and results are properly labeled and appropriately displayed.  7. The need to administer antibiotics or fluids for irrigation purposes during the procedure as applicable.  8. Safety precautions based on patient history or medication use.    During Procedure: Verification of correct person, site, and procedure occurs any time the responsibility for care of the patient is transferred to another member of the care team.        Magdalena Griffin, EMT  8/1/2022  4:14 PM

## 2022-08-01 NOTE — PROGRESS NOTES
Male Cystoscopy Procedure Note     PRE-PROCEDURE DIAGNOSIS: bladder exstrophy and h/o bladder stones  POST-PROCEDURE DIAGNOSIS: bladder exstrophy and 1cm bladder stone  PROCEDURE: cystoscopy  HISTORY: Olu Trinh is a 35 year old male with a history of neurogenic bladder secondary to Bladder / cloacal exstrophy.   We removed a bladder stone in 2021. Also had percutaneous bladder stone surgery by Dr. Blunt in 2015 and Patrice in 2018.  He has chronic bilateral hydronephrosis      Previous Bladder Surgeries:  Previous Bladder Augmentation: unknown bowel type in unknown year  Catheterizable stoma:appendiceal Mitrofanoff in unknown year.   Revisions include: none   Anti-incontinence procedures: none  Botox injections: None     Pertinent Medications:  Current Anticholinergics: None  Current Prophylactic antibiotics: None  Intravesical gentamycin:  None  Intravesical oxybutinin: None     Catheterization History:  The patient catheterizes per Mitrofanoff stoma into a augmented bladder with a 14F straight catheter q3-4 hours. Catheterization is performed by  self. The patient uses a new catheter each time. He does not irrigate the bladder. He used catheterize only when he feels full; sometimes he gets flank pain when he feels full but now he does it more regularly. Has also stopped drinking POP per instructions. He does not irrigate vigously, instead just puts in 60cc and lets it drain passively     Incontinence History:  He does not leak between voids/caths. He does not experience urgency of urination.     If patient has an AUS or Sling then:  Leak management: No leak management necessary      Urinary Tract Infection History:  Treated with antibiotics for positive culture and non-specific symptoms: 0-2 times in the last year  year     Bowel Movement History:  The patient has a bowel movement q1 days. Bowel regimen includes none    DESCRIPTION OF PROCEDURE:  After informed consent was obtained, the patient was  brought to the procedure room where he was placed in the supine position with all pressure points well padded.  The catheterizable stoma was prepped and draped in a sterile fashion. A flexible cystoscope was introduced through a well-lubricated channel.  The channel was free of stricture but the course was a little tortuous. The bladder plate was small and most of the bladder was composed of bladder augment. Bladder neck was open. Bladder has a 1cm stone.The flexible cystoscope was removed and the findings were described to the patient.   ASSESSMENT AND PLAN:  37 year old man with 1cm bladder stone. Will schedule for cystoscopic laser lithotripsy with scope through the channel. No need for percutaneous approach.     Then will do office cysto q6 months to evacuate these recurrent stones in the office when they are smaller.

## 2022-08-01 NOTE — LETTER
Date:August 2, 2022      Patient was self referred, no letter generated. Do not send.        [facility] Health Information

## 2022-08-15 ENCOUNTER — TELEPHONE (OUTPATIENT)
Dept: UROLOGY | Facility: CLINIC | Age: 37
End: 2022-08-15

## 2022-08-15 NOTE — TELEPHONE ENCOUNTER
Patient is scheduled for surgery with Dr. Blunt     Spoke with: Patient via phone     Date of Surgery: Friday September 09, 2022     Location: [facility] OR      Informed patient they will need an adult : Yes     Pre-op: Yes      H&P: Patient to schedule with PCP     NP EVAL: Friday August 26, 2022     Pre-procedure COVID-19 Test: Tuesday September 06, 2022 at [facility]     Post-op: Monday March 13, 2023    Additional imaging/appointments:     Additional comments:      Surgery packet: Sent via mail 8/15/22    Patient is aware that surgery time is tentative to change and to expect a call 3-1 business days from Pre Admission Nursing for instructions and arrival time

## 2022-08-26 ENCOUNTER — OFFICE VISIT (OUTPATIENT)
Dept: FAMILY MEDICINE | Facility: CLINIC | Age: 37
End: 2022-08-26
Payer: COMMERCIAL

## 2022-08-26 ENCOUNTER — LAB (OUTPATIENT)
Dept: LAB | Facility: CLINIC | Age: 37
End: 2022-08-26
Payer: COMMERCIAL

## 2022-08-26 VITALS
RESPIRATION RATE: 17 BRPM | BODY MASS INDEX: 38.28 KG/M2 | OXYGEN SATURATION: 94 % | DIASTOLIC BLOOD PRESSURE: 98 MMHG | TEMPERATURE: 97.9 F | HEART RATE: 80 BPM | WEIGHT: 282.6 LBS | HEIGHT: 72 IN | SYSTOLIC BLOOD PRESSURE: 136 MMHG

## 2022-08-26 DIAGNOSIS — Z01.818 PRE-OP EXAM: ICD-10-CM

## 2022-08-26 DIAGNOSIS — Z01.818 PRE-OP EXAM: Primary | ICD-10-CM

## 2022-08-26 LAB
ANION GAP SERPL CALCULATED.3IONS-SCNC: 10 MMOL/L (ref 3–14)
BUN SERPL-MCNC: 17 MG/DL (ref 7–30)
CALCIUM SERPL-MCNC: 9.1 MG/DL (ref 8.5–10.1)
CHLORIDE BLD-SCNC: 109 MMOL/L (ref 94–109)
CO2 SERPL-SCNC: 25 MMOL/L (ref 20–32)
CREAT SERPL-MCNC: 0.94 MG/DL (ref 0.66–1.25)
ERYTHROCYTE [DISTWIDTH] IN BLOOD BY AUTOMATED COUNT: 12.9 % (ref 10–15)
GFR SERPL CREATININE-BSD FRML MDRD: >90 ML/MIN/1.73M2
GLUCOSE BLD-MCNC: 102 MG/DL (ref 70–99)
HCT VFR BLD AUTO: 44.7 % (ref 40–53)
HGB BLD-MCNC: 14.8 G/DL (ref 13.3–17.7)
MCH RBC QN AUTO: 29.5 PG (ref 26.5–33)
MCHC RBC AUTO-ENTMCNC: 33.1 G/DL (ref 31.5–36.5)
MCV RBC AUTO: 89 FL (ref 78–100)
PLATELET # BLD AUTO: 198 10E3/UL (ref 150–450)
POTASSIUM BLD-SCNC: 4.2 MMOL/L (ref 3.4–5.3)
RBC # BLD AUTO: 5.02 10E6/UL (ref 4.4–5.9)
SODIUM SERPL-SCNC: 144 MMOL/L (ref 133–144)
WBC # BLD AUTO: 5.6 10E3/UL (ref 4–11)

## 2022-08-26 PROCEDURE — 99204 OFFICE O/P NEW MOD 45 MIN: CPT | Performed by: NURSE PRACTITIONER

## 2022-08-26 PROCEDURE — 36415 COLL VENOUS BLD VENIPUNCTURE: CPT | Performed by: PATHOLOGY

## 2022-08-26 PROCEDURE — 80048 BASIC METABOLIC PNL TOTAL CA: CPT | Performed by: PATHOLOGY

## 2022-08-26 PROCEDURE — 85027 COMPLETE CBC AUTOMATED: CPT | Performed by: PATHOLOGY

## 2022-08-26 NOTE — PROGRESS NOTES
[facility] [address]  [address] [facility] [address]  Phone: [phone]  Fax: [phone]  Primary Provider: No Ref-Primary, Physician  Pre-op Performing Provider: BENEDICTO BUSTILLO    PREOPERATIVE EVALUATION:  Today's date: 8/26/2022    Olu Trinh is a 37 year old male who presents for a preoperative evaluation.    Surgical Information:  Surgery/Procedure: LITHOTRIPSY, WITH CYSTOSCOPY  [place] [facility] [place]  Surgeon: Joel Blunt MD  Surgery Date: 9/9/2022  Time of Surgery: 10:50 AM  Where patient plans to recover: At home with roommates  Fax number for surgical facility: Note does not need to be faxed, will be available electronically in Epic.    Type of Anesthesia Anticipated: Monitor Anesthesia Care    Assessment & Plan     The proposed surgical procedure is considered INTERMEDIATE risk.    Pre-op exam  Update labs as noted below. Repeat BP closer to goal (patient admitted to bike riding prior to appointment.)   - CBC with platelets; Future  - Basic metabolic panel; Future         Risks and Recommendations:  The patient has the following additional risks and recommendations for perioperative complications:   - Consult Hospitalist / IM to assist with post-op medical management   - Hx Seizures- Last seizure June/2022. Follows Dr. Patel of [facility].  Continue all antiepileptics without modification.    Medication Instructions:  Patient is to take all scheduled medications on the day of surgery EXCEPT for modifications listed below:   - ibuprofen (Advil, Motrin): HOLD 1 day before surgery.    - Benzodiazepines: Continue without modification.  Hold herbal supplements 1 week before surgery.  -Continue all antiepileptics.         RECOMMENDATION:  APPROVAL GIVEN to proceed with proposed procedure, without further diagnostic evaluation.    Review of external notes as documented above     Subjective     HPI related to upcoming  procedure:  37-year-old male presents for pre-op exam. PMH includes neurogenic bladder secondary to bladder/cloacal exstrophy and chronic bilateral hydronephrosis.  Patient had a bedside cystoscopy on 8/1/22 which demonstrated a 1cm bladder stone. He is scheduled for a LITHOTRIPSY, WITH CYSTOSCOPY with Dr. Blunt on 9/9/22. Today, patient denies acute concerns/complaints at time of exam.      Preop Questions 8/26/2022   1. Have you ever had a heart attack or stroke? No   2. Have you ever had surgery on your heart or blood vessels, such as a stent placement, a coronary artery bypass, or surgery on an artery in your head, neck, heart, or legs? No   3. Do you have chest pain with activity? No   4. Do you have a history of  heart failure? No   5. Do you currently have a cold, bronchitis or symptoms of other infection? No   6. Do you have a cough, shortness of breath, or wheezing? No   7. Do you or anyone in your family have previous history of blood clots? YES - Father with hx of PE   8. Do you or does anyone in your family have a serious bleeding problem such as prolonged bleeding following surgeries or cuts? No   9. Have you ever had problems with anemia or been told to take iron pills? No   10. Have you had any abnormal blood loss such as black, tarry or bloody stools? YES - Hematuria from Kidney Stones   11. Have you ever had a blood transfusion? No   12. Are you willing to have a blood transfusion if it is medically needed before, during, or after your surgery? YES   13. Have you or any of your relatives ever had problems with anesthesia? No   14. Do you have sleep apnea, excessive snoring or daytime drowsiness? No   15. Do you have any artifical heart valves or other implanted medical devices like a pacemaker, defibrillator, or continuous glucose monitor? No   16. Do you have artificial joints? No   17. Are you allergic to latex? No     Health Care Directive:  Patient does not have a Health Care Directive or  Living Will: Patient states has Advance Directive and will bring in a copy to clinic.    Preoperative Review of :   reviewed - Last script 9/2021      Status of Chronic Conditions:  Hx Seizures- Last seizure June/2022, follows Dr. Patel with [facility]. Patient reports he has approximately 4 seizures/year.    Review of Systems  CONSTITUTIONAL: NEGATIVE for fever, chills, change in weight  INTEGUMENTARY/SKIN: NEGATIVE for worrisome rashes, moles or lesions  EYES: NEGATIVE for vision changes or irritation  ENT/MOUTH: NEGATIVE for ear, mouth and throat problems  RESP: NEGATIVE for significant cough or SOB  CV: NEGATIVE for chest pain, palpitations or peripheral edema  GI: NEGATIVE for nausea, abdominal pain, heartburn, or change in bowel habits  : As noted in HPI.  MUSCULOSKELETAL: NEGATIVE for significant arthralgias or myalgia  NEURO: NEGATIVE for weakness, dizziness or paresthesias  ENDOCRINE: NEGATIVE for temperature intolerance, skin/hair changes  HEME: NEGATIVE for bleeding problems  PSYCHIATRIC: NEGATIVE for changes in mood or affect      Patient Active Problem List    Diagnosis Date Noted     Intractable epilepsy without status epilepticus, unspecified epilepsy type (H) 05/11/2018     Priority: Medium     Exstrophy of bladder sequence 06/29/2015     Priority: Medium     s/p bladder augmentation, bladder neck reconstruction (Mikey Glez) and creation of Mitrofanoff channel in 2002. He is not wheel chair bound. He uses 14F cath for CIC.        Other hydronephrosis 06/02/2015     Priority: Medium     Bladder stone 06/02/2015     Priority: Medium     Neurogenic bladder 10/30/2012     Priority: Medium     Problem list name updated by automated process. Provider to review       Attention deficit hyperactivity disorder (ADHD) 11/06/2006     Priority: Medium     Problem list name updated by automated process. Provider to review        Past Medical History:   Diagnosis Date     Attention deficit disorder  with hyperactivity(314.01)      Bladder stone 06/02/2015     Exstrophy of bladder sequence 06/29/2015     Learning disability      Neurogenic bladder, NOS 10/30/2012     Obesity (BMI 35.0-39.9 without comorbidity)      Other hydronephrosis 06/02/2015     Unspecified epilepsy with intractable epilepsy 10/22/2013     Past Surgical History:   Procedure Laterality Date     BLADDER AUGMENTATION       bladder neck reconstruction      Mikey Glez     CYSTOSCOPY N/A 07/10/2018    Procedure: CYSTOSCOPY;  CYSTOSCOPY  OF NEOBLADDER  ;  Surgeon: Mario Ibrahim MD;  Location: [facility] OR     CYSTOSCOPY VIA MITROFANOFF N/A 1/29/2021    Procedure: CYSTOSCOPY, VIA MITROFANOFF;  Surgeon: Joel Blunt MD;  Location: [facility] OR     LASER HOLMIUM LITHOTRIPSY BLADDER N/A 07/24/2015    Procedure: LASER HOLMIUM LITHOTRIPSY BLADDER;  Surgeon: Joel Blunt MD;  Location: [facility] OR     LASER HOLMIUM LITHOTRIPSY BLADDER N/A 07/10/2018    Procedure: LASER HOLMIUM LITHOTRIPSY BLADDER;;  Surgeon: Mario Ibrahim MD;  Location: [facility] OR     LEFT TEMPORAL CRANIOTOMY FOR ANTERIOR TEMPORAL LOBECTOMY  10/01/2018     MITROFANOFF PROCEDURE (APPENDIX CONDUIT)  01/01/2002     ZZ REMOVAL OF KIDNEY STONE       Current Outpatient Medications   Medication Sig Dispense Refill     Cranberry 500 MG CAPS Take 500 mg by mouth       ibuprofen (ADVIL/MOTRIN) 200 MG capsule Take 200 mg by mouth every 4 hours as needed for fever       levETIRAcetam (KEPPRA XR) 500 MG 24 hr tablet Take 500 mg by mouth daily Takes 4 tablet every am       LORazepam (ATIVAN) 1 MG tablet TAKE 1 TABLET BY MOUTH AS NEEDED FOR SEIZURE       OXTELLAR  MG 24 hr tablet          Allergies   Allergen Reactions     Ondansetron      Other reaction(s): Hallucinations     Sulfa Drugs      unknown        Social History     Tobacco Use     Smoking status: Never Smoker     Smokeless tobacco: Never Used   Substance Use Topics     Alcohol use: Yes     Comment: Alcoholic beverage  once or twice per week.     Family History   Problem Relation Age of Onset     Seizure Disorder Mother      Breast Cancer Mother      Cancer Father      History   Drug Use No         Objective     BP (!) 152/104 (BP Location: Right arm, Patient Position: Sitting, Cuff Size: Adult Large)   Pulse 80   Temp 97.9  F (36.6  C) (Oral)   Resp 17   Ht 1.829 m (6')   Wt 128.2 kg (282 lb 9.6 oz)   SpO2 94%   BMI 38.33 kg/m        Physical Exam    GENERAL APPEARANCE: healthy, alert and no distress     EYES: EOMI,  PERRL     HENT: ear canals and TM's normal and nose and mouth without ulcers or lesions     NECK: no adenopathy, no asymmetry, masses, or scars and thyroid normal to palpation     RESP: lungs clear to auscultation - no rales, rhonchi or wheezes     CV: regular rates and rhythm, normal S1 S2, no S3 or S4 and no murmur, click or rub     ABDOMEN:  soft, nontender, no HSM or masses and bowel sounds normal. Left flank tenderness noted (chronic issue per patient.)     MS: extremities normal- no gross deformities noted, no evidence of inflammation in joints, FROM in all extremities.     SKIN: no suspicious lesions or rashes     NEURO: Normal strength and tone, sensory exam grossly normal, mentation intact and speech normal     PSYCH: mentation appears normal. and affect normal/bright     LYMPHATICS: No cervical adenopathy    Recent Labs   Lab Test 06/03/21  1613 12/02/20  0000 09/12/20  0000   POTASSIUM  --  3.5 4.3   CR 0.92 1.39* 0.93        Diagnostics:  Labs pending at this time.  Results will be reviewed when available.   No EKG required, no history of coronary heart disease, significant arrhythmia, peripheral arterial disease or other structural heart disease.    Revised Cardiac Risk Index (RCRI):  The patient has the following serious cardiovascular risks for perioperative complications:   - No serious cardiac risks = 0 points   METS>4, rides bike  RCRI Interpretation: 0 points: Class I (very low risk - 0.4%  complication rate)    All questions/concerns addressed. Patient stated understanding/agreement to plan of care.         Signed Electronically by: PATRICIA Enciso CNP  Copy of this evaluation report is provided to requesting physician.

## 2022-08-26 NOTE — H&P (VIEW-ONLY)
[facility] [address]  [address] [facility] [address]  Phone: [phone]  Fax: [phone]  Primary Provider: No Ref-Primary, Physician  Pre-op Performing Provider: BENEDICTO BUSTILLO    PREOPERATIVE EVALUATION:  Today's date: 8/26/2022    Olu Trinh is a 37 year old male who presents for a preoperative evaluation.    Surgical Information:  Surgery/Procedure: LITHOTRIPSY, WITH CYSTOSCOPY  [place] [facility] [place]  Surgeon: Joel Blunt MD  Surgery Date: 9/9/2022  Time of Surgery: 10:50 AM  Where patient plans to recover: At home with roommates  Fax number for surgical facility: Note does not need to be faxed, will be available electronically in Epic.    Type of Anesthesia Anticipated: Monitor Anesthesia Care    Assessment & Plan     The proposed surgical procedure is considered INTERMEDIATE risk.    Pre-op exam  Update labs as noted below. Repeat BP closer to goal (patient admitted to bike riding prior to appointment.)   - CBC with platelets; Future  - Basic metabolic panel; Future         Risks and Recommendations:  The patient has the following additional risks and recommendations for perioperative complications:   - Consult Hospitalist / IM to assist with post-op medical management   - Hx Seizures- Last seizure June/2022. Follows Dr. Patel of [facility].  Continue all antiepileptics without modification.    Medication Instructions:  Patient is to take all scheduled medications on the day of surgery EXCEPT for modifications listed below:   - ibuprofen (Advil, Motrin): HOLD 1 day before surgery.    - Benzodiazepines: Continue without modification.  Hold herbal supplements 1 week before surgery.  -Continue all antiepileptics.         RECOMMENDATION:  APPROVAL GIVEN to proceed with proposed procedure, without further diagnostic evaluation.    Review of external notes as documented above     Subjective     HPI related to upcoming  procedure:  37-year-old male presents for pre-op exam. PMH includes neurogenic bladder secondary to bladder/cloacal exstrophy and chronic bilateral hydronephrosis.  Patient had a bedside cystoscopy on 8/1/22 which demonstrated a 1cm bladder stone. He is scheduled for a LITHOTRIPSY, WITH CYSTOSCOPY with Dr. Blunt on 9/9/22. Today, patient denies acute concerns/complaints at time of exam.      Preop Questions 8/26/2022   1. Have you ever had a heart attack or stroke? No   2. Have you ever had surgery on your heart or blood vessels, such as a stent placement, a coronary artery bypass, or surgery on an artery in your head, neck, heart, or legs? No   3. Do you have chest pain with activity? No   4. Do you have a history of  heart failure? No   5. Do you currently have a cold, bronchitis or symptoms of other infection? No   6. Do you have a cough, shortness of breath, or wheezing? No   7. Do you or anyone in your family have previous history of blood clots? YES - Father with hx of PE   8. Do you or does anyone in your family have a serious bleeding problem such as prolonged bleeding following surgeries or cuts? No   9. Have you ever had problems with anemia or been told to take iron pills? No   10. Have you had any abnormal blood loss such as black, tarry or bloody stools? YES - Hematuria from Kidney Stones   11. Have you ever had a blood transfusion? No   12. Are you willing to have a blood transfusion if it is medically needed before, during, or after your surgery? YES   13. Have you or any of your relatives ever had problems with anesthesia? No   14. Do you have sleep apnea, excessive snoring or daytime drowsiness? No   15. Do you have any artifical heart valves or other implanted medical devices like a pacemaker, defibrillator, or continuous glucose monitor? No   16. Do you have artificial joints? No   17. Are you allergic to latex? No     Health Care Directive:  Patient does not have a Health Care Directive or  Living Will: Patient states has Advance Directive and will bring in a copy to clinic.    Preoperative Review of :   reviewed - Last script 9/2021      Status of Chronic Conditions:  Hx Seizures- Last seizure June/2022, follows Dr. Patel with [facility]. Patient reports he has approximately 4 seizures/year.    Review of Systems  CONSTITUTIONAL: NEGATIVE for fever, chills, change in weight  INTEGUMENTARY/SKIN: NEGATIVE for worrisome rashes, moles or lesions  EYES: NEGATIVE for vision changes or irritation  ENT/MOUTH: NEGATIVE for ear, mouth and throat problems  RESP: NEGATIVE for significant cough or SOB  CV: NEGATIVE for chest pain, palpitations or peripheral edema  GI: NEGATIVE for nausea, abdominal pain, heartburn, or change in bowel habits  : As noted in HPI.  MUSCULOSKELETAL: NEGATIVE for significant arthralgias or myalgia  NEURO: NEGATIVE for weakness, dizziness or paresthesias  ENDOCRINE: NEGATIVE for temperature intolerance, skin/hair changes  HEME: NEGATIVE for bleeding problems  PSYCHIATRIC: NEGATIVE for changes in mood or affect      Patient Active Problem List    Diagnosis Date Noted     Intractable epilepsy without status epilepticus, unspecified epilepsy type (H) 05/11/2018     Priority: Medium     Exstrophy of bladder sequence 06/29/2015     Priority: Medium     s/p bladder augmentation, bladder neck reconstruction (Mikey Glez) and creation of Mitrofanoff channel in 2002. He is not wheel chair bound. He uses 14F cath for CIC.        Other hydronephrosis 06/02/2015     Priority: Medium     Bladder stone 06/02/2015     Priority: Medium     Neurogenic bladder 10/30/2012     Priority: Medium     Problem list name updated by automated process. Provider to review       Attention deficit hyperactivity disorder (ADHD) 11/06/2006     Priority: Medium     Problem list name updated by automated process. Provider to review        Past Medical History:   Diagnosis Date     Attention deficit disorder  with hyperactivity(314.01)      Bladder stone 06/02/2015     Exstrophy of bladder sequence 06/29/2015     Learning disability      Neurogenic bladder, NOS 10/30/2012     Obesity (BMI 35.0-39.9 without comorbidity)      Other hydronephrosis 06/02/2015     Unspecified epilepsy with intractable epilepsy 10/22/2013     Past Surgical History:   Procedure Laterality Date     BLADDER AUGMENTATION       bladder neck reconstruction      Mikey Glez     CYSTOSCOPY N/A 07/10/2018    Procedure: CYSTOSCOPY;  CYSTOSCOPY  OF NEOBLADDER  ;  Surgeon: Mario Ibrahim MD;  Location: [facility] OR     CYSTOSCOPY VIA MITROFANOFF N/A 1/29/2021    Procedure: CYSTOSCOPY, VIA MITROFANOFF;  Surgeon: Joel Blunt MD;  Location: [facility] OR     LASER HOLMIUM LITHOTRIPSY BLADDER N/A 07/24/2015    Procedure: LASER HOLMIUM LITHOTRIPSY BLADDER;  Surgeon: Joel Blunt MD;  Location: [facility] OR     LASER HOLMIUM LITHOTRIPSY BLADDER N/A 07/10/2018    Procedure: LASER HOLMIUM LITHOTRIPSY BLADDER;;  Surgeon: Mario Ibrahim MD;  Location: [facility] OR     LEFT TEMPORAL CRANIOTOMY FOR ANTERIOR TEMPORAL LOBECTOMY  10/01/2018     MITROFANOFF PROCEDURE (APPENDIX CONDUIT)  01/01/2002     ZZ REMOVAL OF KIDNEY STONE       Current Outpatient Medications   Medication Sig Dispense Refill     Cranberry 500 MG CAPS Take 500 mg by mouth       ibuprofen (ADVIL/MOTRIN) 200 MG capsule Take 200 mg by mouth every 4 hours as needed for fever       levETIRAcetam (KEPPRA XR) 500 MG 24 hr tablet Take 500 mg by mouth daily Takes 4 tablet every am       LORazepam (ATIVAN) 1 MG tablet TAKE 1 TABLET BY MOUTH AS NEEDED FOR SEIZURE       OXTELLAR  MG 24 hr tablet          Allergies   Allergen Reactions     Ondansetron      Other reaction(s): Hallucinations     Sulfa Drugs      unknown        Social History     Tobacco Use     Smoking status: Never Smoker     Smokeless tobacco: Never Used   Substance Use Topics     Alcohol use: Yes     Comment: Alcoholic beverage  once or twice per week.     Family History   Problem Relation Age of Onset     Seizure Disorder Mother      Breast Cancer Mother      Cancer Father      History   Drug Use No         Objective     BP (!) 152/104 (BP Location: Right arm, Patient Position: Sitting, Cuff Size: Adult Large)   Pulse 80   Temp 97.9  F (36.6  C) (Oral)   Resp 17   Ht 1.829 m (6')   Wt 128.2 kg (282 lb 9.6 oz)   SpO2 94%   BMI 38.33 kg/m        Physical Exam    GENERAL APPEARANCE: healthy, alert and no distress     EYES: EOMI,  PERRL     HENT: ear canals and TM's normal and nose and mouth without ulcers or lesions     NECK: no adenopathy, no asymmetry, masses, or scars and thyroid normal to palpation     RESP: lungs clear to auscultation - no rales, rhonchi or wheezes     CV: regular rates and rhythm, normal S1 S2, no S3 or S4 and no murmur, click or rub     ABDOMEN:  soft, nontender, no HSM or masses and bowel sounds normal. Left flank tenderness noted (chronic issue per patient.)     MS: extremities normal- no gross deformities noted, no evidence of inflammation in joints, FROM in all extremities.     SKIN: no suspicious lesions or rashes     NEURO: Normal strength and tone, sensory exam grossly normal, mentation intact and speech normal     PSYCH: mentation appears normal. and affect normal/bright     LYMPHATICS: No cervical adenopathy    Recent Labs   Lab Test 06/03/21  1613 12/02/20  0000 09/12/20  0000   POTASSIUM  --  3.5 4.3   CR 0.92 1.39* 0.93        Diagnostics:  Labs pending at this time.  Results will be reviewed when available.   No EKG required, no history of coronary heart disease, significant arrhythmia, peripheral arterial disease or other structural heart disease.    Revised Cardiac Risk Index (RCRI):  The patient has the following serious cardiovascular risks for perioperative complications:   - No serious cardiac risks = 0 points   METS>4, rides bike  RCRI Interpretation: 0 points: Class I (very low risk - 0.4%  complication rate)    All questions/concerns addressed. Patient stated understanding/agreement to plan of care.         Signed Electronically by: PATRICIA Enciso CNP  Copy of this evaluation report is provided to requesting physician.

## 2022-08-31 ENCOUNTER — TELEPHONE (OUTPATIENT)
Dept: UROLOGY | Facility: CLINIC | Age: 37
End: 2022-08-31

## 2022-08-31 DIAGNOSIS — N39.0 URINARY TRACT INFECTION: ICD-10-CM

## 2022-08-31 DIAGNOSIS — N21.0 BLADDER STONE: Primary | ICD-10-CM

## 2022-08-31 DIAGNOSIS — Z01.818 PRE-OPERATIVE EXAMINATION: ICD-10-CM

## 2022-08-31 NOTE — TELEPHONE ENCOUNTER
Procedure: cystolithotomy, percutaneous     Date: 9/9/22  Provider: Dr. Joel Blunt     Post op appt: Scheduled - 6 month cysto    H&P: 8/26/22  UA/UC: 9/1/22  COVID test: scheduled 9/6/22    Medications: reviewed  Soap: pt to   Reviewed when to start clear liquids and when to start NPO: reviewed  : roommates   24 hour observation: roommates     Pt or family member expressed understanding: yes    Ester Robbins CMA  09/01/22  2:26 PM    Detailed message left asking pt to call back to review surgical plan, drop off a urine sample, orders placed per protocol.    Ester Robbins CMA  08/31/22  4:03 PM      Message left returning pt's call.    Ester Robbins CMA  09/01/22  10:19 AM

## 2022-09-01 ENCOUNTER — LAB (OUTPATIENT)
Dept: LAB | Facility: CLINIC | Age: 37
End: 2022-09-01
Payer: COMMERCIAL

## 2022-09-01 DIAGNOSIS — N21.0 BLADDER STONE: ICD-10-CM

## 2022-09-01 DIAGNOSIS — N39.0 URINARY TRACT INFECTION: ICD-10-CM

## 2022-09-01 LAB
ALBUMIN UR-MCNC: 100 MG/DL
APPEARANCE UR: ABNORMAL
BACTERIA #/AREA URNS HPF: ABNORMAL /HPF
BILIRUB UR QL STRIP: NEGATIVE
COLOR UR AUTO: YELLOW
GLUCOSE UR STRIP-MCNC: NEGATIVE MG/DL
HGB UR QL STRIP: ABNORMAL
KETONES UR STRIP-MCNC: NEGATIVE MG/DL
LEUKOCYTE ESTERASE UR QL STRIP: ABNORMAL
MUCOUS THREADS #/AREA URNS LPF: PRESENT /LPF
NITRATE UR QL: POSITIVE
PH UR STRIP: 6.5 [PH] (ref 5–7)
RBC URINE: 10 /HPF
SP GR UR STRIP: 1.02 (ref 1–1.03)
UROBILINOGEN UR STRIP-MCNC: NORMAL MG/DL
WBC CLUMPS #/AREA URNS HPF: PRESENT /HPF
WBC URINE: 174 /HPF

## 2022-09-01 PROCEDURE — 81001 URINALYSIS AUTO W/SCOPE: CPT | Performed by: PATHOLOGY

## 2022-09-01 PROCEDURE — 87086 URINE CULTURE/COLONY COUNT: CPT | Performed by: PATHOLOGY

## 2022-09-01 PROCEDURE — 99000 SPECIMEN HANDLING OFFICE-LAB: CPT | Performed by: PATHOLOGY

## 2022-09-01 PROCEDURE — 87186 SC STD MICRODIL/AGAR DIL: CPT | Performed by: PATHOLOGY

## 2022-09-04 LAB — BACTERIA UR CULT: ABNORMAL

## 2022-09-06 ENCOUNTER — TELEPHONE (OUTPATIENT)
Dept: UROLOGY | Facility: CLINIC | Age: 37
End: 2022-09-06

## 2022-09-06 DIAGNOSIS — N39.0 URINARY TRACT INFECTION: ICD-10-CM

## 2022-09-06 DIAGNOSIS — N21.0 BLADDER STONE: Primary | ICD-10-CM

## 2022-09-06 RX ORDER — NITROFURANTOIN 25; 75 MG/1; MG/1
100 CAPSULE ORAL 2 TIMES DAILY
Qty: 10 CAPSULE | Refills: 0 | Status: SHIPPED | OUTPATIENT
Start: 2022-09-06 | End: 2022-09-11

## 2022-09-06 NOTE — TELEPHONE ENCOUNTER
Detailed message left on voicemail notifying pt of medication order, where it was sent, and instructions. Pt asked to call back with confirmation. Direct line given.    Ester Robbins CMA  09/06/22  4:48 PM

## 2022-09-06 NOTE — TELEPHONE ENCOUNTER
Message left asking pt to call back with for instructions with antibiotic. Antibiotic sent to pharmacy on file.    Ester Robbins CMA  09/06/22  4:09 PM      ----- Message from Carole Burr RN sent at 9/6/2022  1:49 PM CDT -----  Regarding: FW: UC sensitivity result on 9/04 DOS 09/09/22  Based on his sensitivity he can take Nitrofurantoin 100 mg BID 3 days before surgery and Dr Nicolas would like him covered for 2 days post op also.    5 days Nitrofurantoin 100 mg BID x 5 days start today.    Thank you    Carole      ----- Message -----  From: Carole Burr RN  Sent: 9/6/2022  11:49 AM CDT  To: [facility] Navigator  Subject: FW: UC sensitivity result on 9/04 DOS 09/09/#    Ester,    Please send this to patients preferred pharmacy.    Thank you    Carole   ----- Message -----  From: Manjit Nicolas MD  Sent: 9/6/2022  10:19 AM CDT  To: Carole Burr RN, Ester Robbins CMA  Subject: RE: UC sensitivity result on 9/04 DOS 09/09/#    Hello,     Can we sent this patient bactrim 800mg BID for 3 days prior and 2 days after surgery?     Thank you!  Cally  ----- Message -----  From: Carole Burr RN  Sent: 9/3/2022   8:43 PM CDT  To: Manjit Nicolas MD, Carole Burr RN, #  Subject: UC sensitivity result on 9/04 DOS 09/09/22

## 2022-09-06 NOTE — TELEPHONE ENCOUNTER
Pt called my direct line, picked up antibiotic this morning and started. He expressed understanding that he will hold his med the morning of the surgery and will resume in the evening.      Ester Robbins CMA  09/07/22  1:42 PM

## 2022-09-08 ENCOUNTER — TELEPHONE (OUTPATIENT)
Dept: UROLOGY | Facility: CLINIC | Age: 37
End: 2022-09-08

## 2022-09-08 ENCOUNTER — ANESTHESIA EVENT (OUTPATIENT)
Dept: SURGERY | Facility: AMBULATORY SURGERY CENTER | Age: 37
End: 2022-09-08
Payer: COMMERCIAL

## 2022-09-08 RX ORDER — FENTANYL CITRATE 50 UG/ML
25 INJECTION, SOLUTION INTRAMUSCULAR; INTRAVENOUS
Status: CANCELLED | OUTPATIENT
Start: 2022-09-08

## 2022-09-08 NOTE — TELEPHONE ENCOUNTER
Pt called to verify arrival time for surgical procedure 9/9/22. Pt notified of surgery time and arrival time.    Ester Robbins CMA  09/08/22  11:57 AM

## 2022-09-09 ENCOUNTER — HOSPITAL ENCOUNTER (OUTPATIENT)
Facility: AMBULATORY SURGERY CENTER | Age: 37
Discharge: HOME | End: 2022-09-09
Attending: UROLOGY
Payer: COMMERCIAL

## 2022-09-09 ENCOUNTER — ANESTHESIA (OUTPATIENT)
Dept: SURGERY | Facility: AMBULATORY SURGERY CENTER | Age: 37
End: 2022-09-09
Payer: COMMERCIAL

## 2022-09-09 VITALS
TEMPERATURE: 98.1 F | HEIGHT: 72 IN | SYSTOLIC BLOOD PRESSURE: 103 MMHG | RESPIRATION RATE: 18 BRPM | BODY MASS INDEX: 37.93 KG/M2 | HEART RATE: 45 BPM | DIASTOLIC BLOOD PRESSURE: 81 MMHG | OXYGEN SATURATION: 97 % | WEIGHT: 280 LBS

## 2022-09-09 DIAGNOSIS — N21.0 BLADDER STONE: ICD-10-CM

## 2022-09-09 PROCEDURE — 52315 CYSTOSCOPY AND TREATMENT: CPT | Mod: GC | Performed by: UROLOGY

## 2022-09-09 PROCEDURE — 52315 CYSTOSCOPY AND TREATMENT: CPT

## 2022-09-09 RX ORDER — CEFAZOLIN SODIUM IN 0.9 % NACL 3 G/100 ML
3 INTRAVENOUS SOLUTION, PIGGYBACK (ML) INTRAVENOUS SEE ADMIN INSTRUCTIONS
Status: DISCONTINUED | OUTPATIENT
Start: 2022-09-09 | End: 2022-09-09 | Stop reason: HOSPADM

## 2022-09-09 RX ORDER — CEFAZOLIN SODIUM IN 0.9 % NACL 3 G/100 ML
3 INTRAVENOUS SOLUTION, PIGGYBACK (ML) INTRAVENOUS
Status: COMPLETED | OUTPATIENT
Start: 2022-09-09 | End: 2022-09-09

## 2022-09-09 RX ORDER — FENTANYL CITRATE 50 UG/ML
25 INJECTION, SOLUTION INTRAMUSCULAR; INTRAVENOUS EVERY 5 MIN PRN
Status: DISCONTINUED | OUTPATIENT
Start: 2022-09-09 | End: 2022-09-10 | Stop reason: HOSPADM

## 2022-09-09 RX ORDER — MEPERIDINE HYDROCHLORIDE 25 MG/ML
12.5 INJECTION INTRAMUSCULAR; INTRAVENOUS; SUBCUTANEOUS ONCE
Status: DISCONTINUED | OUTPATIENT
Start: 2022-09-09 | End: 2022-09-10 | Stop reason: HOSPADM

## 2022-09-09 RX ORDER — LIDOCAINE HYDROCHLORIDE 20 MG/ML
INJECTION, SOLUTION INFILTRATION; PERINEURAL PRN
Status: DISCONTINUED | OUTPATIENT
Start: 2022-09-09 | End: 2022-09-09

## 2022-09-09 RX ORDER — OXYCODONE HYDROCHLORIDE 5 MG/1
5 TABLET ORAL EVERY 4 HOURS PRN
Status: DISCONTINUED | OUTPATIENT
Start: 2022-09-09 | End: 2022-09-10 | Stop reason: HOSPADM

## 2022-09-09 RX ORDER — PROPOFOL 10 MG/ML
INJECTION, EMULSION INTRAVENOUS PRN
Status: DISCONTINUED | OUTPATIENT
Start: 2022-09-09 | End: 2022-09-09

## 2022-09-09 RX ORDER — LIDOCAINE 40 MG/G
CREAM TOPICAL
Status: DISCONTINUED | OUTPATIENT
Start: 2022-09-09 | End: 2022-09-09 | Stop reason: HOSPADM

## 2022-09-09 RX ORDER — ACETAMINOPHEN 325 MG/1
975 TABLET ORAL ONCE
Status: DISCONTINUED | OUTPATIENT
Start: 2022-09-09 | End: 2022-09-09 | Stop reason: HOSPADM

## 2022-09-09 RX ORDER — SODIUM CHLORIDE, SODIUM LACTATE, POTASSIUM CHLORIDE, CALCIUM CHLORIDE 600; 310; 30; 20 MG/100ML; MG/100ML; MG/100ML; MG/100ML
INJECTION, SOLUTION INTRAVENOUS CONTINUOUS
Status: DISCONTINUED | OUTPATIENT
Start: 2022-09-09 | End: 2022-09-09 | Stop reason: HOSPADM

## 2022-09-09 RX ORDER — SODIUM CHLORIDE, SODIUM LACTATE, POTASSIUM CHLORIDE, CALCIUM CHLORIDE 600; 310; 30; 20 MG/100ML; MG/100ML; MG/100ML; MG/100ML
INJECTION, SOLUTION INTRAVENOUS CONTINUOUS
Status: DISCONTINUED | OUTPATIENT
Start: 2022-09-09 | End: 2022-09-10 | Stop reason: HOSPADM

## 2022-09-09 RX ORDER — PROPOFOL 10 MG/ML
INJECTION, EMULSION INTRAVENOUS CONTINUOUS PRN
Status: DISCONTINUED | OUTPATIENT
Start: 2022-09-09 | End: 2022-09-09

## 2022-09-09 RX ORDER — HYDROMORPHONE HYDROCHLORIDE 1 MG/ML
0.2 INJECTION, SOLUTION INTRAMUSCULAR; INTRAVENOUS; SUBCUTANEOUS EVERY 5 MIN PRN
Status: DISCONTINUED | OUTPATIENT
Start: 2022-09-09 | End: 2022-09-10 | Stop reason: HOSPADM

## 2022-09-09 RX ORDER — DEXAMETHASONE SODIUM PHOSPHATE 4 MG/ML
INJECTION, SOLUTION INTRA-ARTICULAR; INTRALESIONAL; INTRAMUSCULAR; INTRAVENOUS; SOFT TISSUE PRN
Status: DISCONTINUED | OUTPATIENT
Start: 2022-09-09 | End: 2022-09-09

## 2022-09-09 RX ORDER — FENTANYL CITRATE 50 UG/ML
INJECTION, SOLUTION INTRAMUSCULAR; INTRAVENOUS PRN
Status: DISCONTINUED | OUTPATIENT
Start: 2022-09-09 | End: 2022-09-09

## 2022-09-09 RX ADMIN — SODIUM CHLORIDE, SODIUM LACTATE, POTASSIUM CHLORIDE, CALCIUM CHLORIDE: 600; 310; 30; 20 INJECTION, SOLUTION INTRAVENOUS at 10:51

## 2022-09-09 RX ADMIN — Medication 3 G: at 12:28

## 2022-09-09 RX ADMIN — LIDOCAINE HYDROCHLORIDE 40 MG: 20 INJECTION, SOLUTION INFILTRATION; PERINEURAL at 12:07

## 2022-09-09 RX ADMIN — FENTANYL CITRATE 50 MCG: 50 INJECTION, SOLUTION INTRAMUSCULAR; INTRAVENOUS at 12:07

## 2022-09-09 RX ADMIN — DEXAMETHASONE SODIUM PHOSPHATE 4 MG: 4 INJECTION, SOLUTION INTRA-ARTICULAR; INTRALESIONAL; INTRAMUSCULAR; INTRAVENOUS; SOFT TISSUE at 12:30

## 2022-09-09 RX ADMIN — PROPOFOL 100 MG: 10 INJECTION, EMULSION INTRAVENOUS at 12:17

## 2022-09-09 RX ADMIN — PROPOFOL 150 MCG/KG/MIN: 10 INJECTION, EMULSION INTRAVENOUS at 12:06

## 2022-09-09 RX ADMIN — FENTANYL CITRATE 50 MCG: 50 INJECTION, SOLUTION INTRAMUSCULAR; INTRAVENOUS at 12:11

## 2022-09-09 RX ADMIN — LIDOCAINE HYDROCHLORIDE 60 MG: 20 INJECTION, SOLUTION INFILTRATION; PERINEURAL at 12:05

## 2022-09-09 NOTE — ANESTHESIA CARE TRANSFER NOTE
Patient: Olu Trinh    Procedure: Procedure(s):  CYSTOLITHOTOMY, WITH LASER STANDBY       Diagnosis: Bladder stone [N21.0]  Diagnosis Additional Information: No value filed.    Anesthesia Type:   No value filed.     Note:    Oropharynx: spontaneously breathing and oropharynx clear of all foreign objects  Level of Consciousness: awake  Oxygen Supplementation: room air    Independent Airway: airway patency satisfactory and stable  Dentition: dentition unchanged  Vital Signs Stable: post-procedure vital signs reviewed and stable  Report to RN Given: handoff report given  Patient transferred to: PACU  Comments: Resps easy and regular. Report to PACU RN  Handoff Report: Identifed the Patient, Identified the Reponsible Provider, Reviewed the pertinent medical history, Discussed the surgical course, Reviewed Intra-OP anesthesia mangement and issues during anesthesia, Set expectations for post-procedure period and Allowed opportunity for questions and acknowledgement of understanding      Vitals:  Vitals Value Taken Time   BP 95/68 09/09/22 1252   Temp 36.7  C (98.1  F) 09/09/22 1252   Pulse 58 09/09/22 1252   Resp 16 09/09/22 1252   SpO2 94 % 09/09/22 1252       Electronically Signed By: PATRICIA CAMARILLO CRNA  September 9, 2022  12:59 PM

## 2022-09-09 NOTE — ANESTHESIA PREPROCEDURE EVALUATION
Anesthesia Pre-Procedure Evaluation    Patient: Olu Trinh   MRN: [id number] : 1985        Procedure : Procedure(s):  CYSTOLITHOTOMY, PERCUTANEOUS          Past Medical History:   Diagnosis Date     Attention deficit disorder with hyperactivity(314.01)      Bladder stone 2015     Exstrophy of bladder sequence 2015     Learning disability      Neurogenic bladder, NOS 10/30/2012     Obesity (BMI 35.0-39.9 without comorbidity)      Other hydronephrosis 2015     Unspecified epilepsy with intractable epilepsy 10/22/2013      Past Surgical History:   Procedure Laterality Date     BLADDER AUGMENTATION       bladder neck reconstruction      Mikey Glez     CYSTOSCOPY N/A 07/10/2018    Procedure: CYSTOSCOPY;  CYSTOSCOPY  OF NEOBLADDER  ;  Surgeon: Mario Ibrahim MD;  Location: [facility] OR     CYSTOSCOPY VIA MITROFANOFF N/A 2021    Procedure: CYSTOSCOPY, VIA MITROFANOFF;  Surgeon: Joel Blunt MD;  Location: [facility] OR     LASER HOLMIUM LITHOTRIPSY BLADDER N/A 2015    Procedure: LASER HOLMIUM LITHOTRIPSY BLADDER;  Surgeon: Joel Blunt MD;  Location: [facility] OR     LASER HOLMIUM LITHOTRIPSY BLADDER N/A 07/10/2018    Procedure: LASER HOLMIUM LITHOTRIPSY BLADDER;;  Surgeon: Mario Ibrahim MD;  Location: [facility] OR     LEFT TEMPORAL CRANIOTOMY FOR ANTERIOR TEMPORAL LOBECTOMY  10/01/2018     MITROFANOFF PROCEDURE (APPENDIX CONDUIT)  2002     ZZC REMOVAL OF KIDNEY STONE        Allergies   Allergen Reactions     Ondansetron      Other reaction(s): Hallucinations     Sulfa Drugs      unknown      Social History     Tobacco Use     Smoking status: Never Smoker     Smokeless tobacco: Never Used   Substance Use Topics     Alcohol use: Yes     Comment: Alcoholic beverage once or twice per week.      Wt Readings from Last 1 Encounters:   22 127 kg (280 lb)           Physical Exam    Airway  airway exam normal           Respiratory Devices and Support          Dental  no notable dental history         Cardiovascular   cardiovascular exam normal          Pulmonary   pulmonary exam normal                OUTSIDE LABS:  CBC:   Lab Results   Component Value Date    WBC 5.6 08/26/2022    WBC 6.0 09/19/2018    HGB 14.8 08/26/2022    HGB 15.5 09/19/2018    HCT 44.7 08/26/2022    HCT 47.2 09/19/2018     08/26/2022     (L) 09/19/2018     BMP:   Lab Results   Component Value Date     08/26/2022     09/19/2018    POTASSIUM 4.2 08/26/2022    POTASSIUM 3.5 12/02/2020    CHLORIDE 109 08/26/2022    CHLORIDE 107 09/19/2018    CO2 25 08/26/2022    CO2 24 09/19/2018    BUN 17 08/26/2022    BUN 19 09/19/2018    CR 0.94 08/26/2022    CR 0.92 06/03/2021     (H) 08/26/2022     (H) 12/02/2020     COAGS: No results found for: PTT, INR, FIBR  POC: No results found for: BGM, HCG, HCGS  HEPATIC:   Lab Results   Component Value Date    ALBUMIN 4.0 09/19/2018    PROTTOTAL 7.7 09/19/2018    ALT 31 09/19/2018    AST 20 09/19/2018    ALKPHOS 50 09/19/2018    BILITOTAL 0.3 09/19/2018     OTHER:   Lab Results   Component Value Date    MIO 9.1 08/26/2022       Anesthesia Plan    ASA Status:  2   NPO Status:  NPO Appropriate    Anesthesia Type: MAC.     - Reason for MAC: straight local not clinically adequate   Induction: Intravenous, Propofol.   Maintenance: Balanced.        Consents    Anesthesia Plan(s) and associated risks, benefits, and realistic alternatives discussed. Questions answered and patient/representative(s) expressed understanding.    - Discussed:     - Discussed with:  Patient      - Extended Intubation/Ventilatory Support Discussed: No.      - Patient is DNR/DNI Status: No    Use of blood products discussed: No .     Postoperative Care    Pain management: IV analgesics, Oral pain medications, Multi-modal analgesia.   PONV prophylaxis: Dexamethasone or Solumedrol, Ondansetron (or other 5HT-3)     Comments:                Ariana Bright MD

## 2022-09-09 NOTE — DISCHARGE INSTRUCTIONS
[facility]  Home Care Following Anesthesia  For 24 hours after surgery:  Get plenty of rest.  A responsible adult must stay with you for at least 24 hours after you leave the surgery center.  Do not drive or use heavy equipment.  If you have weakness or tingling, don't drive or use heavy equipment until this feeling goes away.   Do not drink alcohol.   Avoid strenuous or risky activities.  Ask for help when climbing stairs.  You may feel lightheaded.  IF so, sit for a few minutes before standing.  Have someone help you get up.   If you have nausea (feel sick to your stomach): Drink only clear liquids such as apple juice, ginger ale, broth or 7-Up.  Rest may also help.  Be sure to drink enough fluids.  Move to a regular diet as you feel able.   You may have a slight fever.  Call the doctor if your fever is over 100 F (37.7 C) (taken under the tongue) or lasts longer than 24 hours.  You may have a dry mouth, a sore throat, muscle aches or trouble sleeping. These should go away after 24 hours.  Do not make important or legal decisions.   It is recommended to avoid smoking.               Tips for taking pain medications  To get the best pain relief possible, remember these points:  Take pain medications as directed, before pain becomes severe.  Pain medication can upset your stomach: taking it with food may help.  Constipation is a common side effect of pain medication. Drink plenty of  fluids.  Eat foods high in fiber. Take a stool softener if recommended by your doctor or pharmacist.  Do not drink alcohol, drive or operate machinery while taking pain medications.  Ask about other ways to control pain, such as with heat, ice or relaxation.    Tylenol/Acetaminophen Consumption  To help encourage the safe use of acetaminophen, the makers of TYLENOL  have lowered the maximum daily dose for single-ingredient Extra Strength TYLENOL  (acetaminophen) products sold in the [place] from 8 pills  per day (4,000 mg) to 6 pills per day (3,000 mg). The dosing interval has also changed from 2 pills every 4-6 hours to 2 pills every 6 hours.  If you feel your pain relief is insufficient, you may take Tylenol/Acetaminophen in addition to your narcotic pain medication.   Be careful not to exceed 3,000 mg of Tylenol/Acetaminophen in a 24 hour period from all sources.  If you are taking extra strength Tylenol/acetaminophen (500 mg), the maximum dose is 6 tablets in 24 hours.  If you are taking regular strength acetaminophen (325 mg), the maximum dose is 9 tablets in 24 hours.    Call a doctor for any of the following:  Signs of infection (fever, growing tenderness at the surgery site, a large amount of drainage or bleeding, severe pain, foul-smelling drainage, redness, swelling).  It has been over 8 to 10 hours since surgery and you are still not able to urinate (pass water).  Headache for over 24 hours.  Numbness, tingling or weakness the day after surgery (if you had spinal anesthesia).  Signs of Covid-19 infection (temperature over 100 degrees, shortness of breath, cough, loss of taste/smell, generalized body aches, persistent headache, chills, sore throat, nausea/vomiting/diarrhea)    Your doctor is:  Dr. Manjit Nicolas, Prostate and Urology: [phone]  Or dial [phone] and ask for the resident on call for:  Prostate Urology  For emergency care, call the:  [facility]:  [phone] (TTY for hearing impaired: [phone])

## 2022-09-09 NOTE — INTERVAL H&P NOTE
I have reviewed the surgical (or preoperative) H&P that is linked to this encounter, and examined the patient. There are no significant changes    Clinical Conditions Present on Arrival:  Clinically Significant Risk Factors Present on Admission                   # Obesity: Estimated body mass index is 37.97 kg/m  as calculated from the following:    Height as of this encounter: 1.829 m (6').    Weight as of this encounter: 127 kg (280 lb).

## 2022-09-09 NOTE — ANESTHESIA PROCEDURE NOTES
Airway    Staff -        CRNA: Mya Lino APRN CRNA       Performed By: CRNAIndications and Patient Condition       Indications for airway management: eric-procedural         Mask difficulty assessment: 1 - vent by mask    Final Airway Details       Final airway type: supraglottic airway    Supraglottic Airway Details        Type: LMA       Brand: I-Gel       LMA size: 5    Post intubation assessment        Placement verified by: capnometry, equal breath sounds and chest rise        Number of attempts at approach: 1       Number of other approaches attempted: 0       Secured with: silk tape       Ease of procedure: easy       Dentition: Intact and Unchanged

## 2022-09-09 NOTE — OP NOTE
PREOPERATIVE DIAGNOSIS:  Neurogenic bladder, bladder stone    POSTOPERATIVE DIAGNOSIS: Neurogenic bladder, bladder stone    PROCEDURES PERFORMED:   1. Cystoscopy through channel with bladder stone removal.    STAFF SURGEON: Joel Blunt MD  FELLOW: Cally Nicolas MD  RESIDENT(S): Rosa May MD    ANESTHESIA: General    ESTIMATED BLOOD LOSS: <5 mL.     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 Has had bladder stones removed in past (2021, 2018 Dr. Ibrahim, 2015 percutaneous). Of note, chronic bilateral hydronephrosis. Recent office cystoscopy demonstrated 1 cm bladder stone. Caths per mitrofanoff stoma into augmented bladder with 14fr straight cath q3-4h, no irrigation. No incontinence between caths. We discussed cystoscopy through the channel and a percutaneous stone removal. The patient was counseled on the alternatives, risks, and benefits and elected to proceed with the above stated procedure including urinary obstruction, infection, pain, bleeding, need for future procedures and risks of anesthesia. The bladder neck is not closed (had young esther leadbetter bladder neck reconstruction as child). Mitrofanoff is Present     DESCRIPTION OF PROCEDURE:   After verification of informed consent was obtained, the patient was brought to the operating room, and moved to the operating table. Antibiotics were given. After adequate anesthesia was induced, prepped and draped in the usual sterile fashion. A formal timeout was performed to confirm the correct patient, procedure and operative site.     A flexible adult cystoscope was inserted into the catheterizable channel.   Stenosis was Present or Absent  Strictures were Present or Absent  False Passage was Present or Absent    Stones were 4 in number  Total size of all stones combined was 2 cm in diameter  The bladder and augment were otherwise normal.      All stones were removed with basket. The procedure  was then concluded. The patient was extubated and transferred to the postanesthesia care unit in stable condition.      POSTOP PLAN:  Follow up in 3-4 months for cystoscopy to evaluate bladder and ensure no significant mucous or stone.  Body mass index is 37.97 kg/m .    Rosa May MD  Urology Resident

## 2022-09-09 NOTE — ANESTHESIA POSTPROCEDURE EVALUATION
Patient: Olu Trinh    Procedure: Procedure(s):  CYSTOLITHOTOMY, WITH LASER STANDBY       Anesthesia Type:  General    Note:  Disposition: Outpatient   Postop Pain Control: Uneventful            Sign Out: Well controlled pain   PONV: No   Neuro/Psych: Uneventful            Sign Out: Acceptable/Baseline neuro status   Airway/Respiratory: Uneventful            Sign Out: Acceptable/Baseline resp. status   CV/Hemodynamics: Uneventful            Sign Out: Acceptable CV status; No obvious hypovolemia; No obvious fluid overload   Other NRE:    DID A NON-ROUTINE EVENT OCCUR?     Event details/Postop Comments:  LMA placed early after MAC failed to maintain ventilation           Last vitals:  Vitals Value Taken Time   /81 09/09/22 1313   Temp 36.7  C (98.1  F) 09/09/22 1313   Pulse 45 09/09/22 1313   Resp 18 09/09/22 1313   SpO2 97 % 09/09/22 1303       Electronically Signed By: Saul Mckeon MD  September 9, 2022  3:04 PM

## 2022-09-12 ENCOUNTER — TELEPHONE (OUTPATIENT)
Dept: UROLOGY | Facility: CLINIC | Age: 37
End: 2022-09-12

## 2022-10-10 ENCOUNTER — HEALTH MAINTENANCE LETTER (OUTPATIENT)
Age: 37
End: 2022-10-10

## 2022-11-07 ENCOUNTER — TELEPHONE (OUTPATIENT)
Dept: UROLOGY | Facility: CLINIC | Age: 37
End: 2022-11-07

## 2022-11-07 ENCOUNTER — TELEPHONE (OUTPATIENT)
Dept: NURSING | Facility: CLINIC | Age: 37
End: 2022-11-07

## 2022-11-07 ENCOUNTER — HOSPITAL ENCOUNTER (INPATIENT)
Facility: CLINIC | Age: 37
LOS: 3 days | Discharge: HOME | End: 2022-11-10
Attending: EMERGENCY MEDICINE | Admitting: INTERNAL MEDICINE
Payer: COMMERCIAL

## 2022-11-07 ENCOUNTER — ANCILLARY PROCEDURE (OUTPATIENT)
Dept: CT IMAGING | Facility: CLINIC | Age: 37
End: 2022-11-07
Attending: NURSE PRACTITIONER
Payer: COMMERCIAL

## 2022-11-07 ENCOUNTER — OFFICE VISIT (OUTPATIENT)
Dept: UROLOGY | Facility: CLINIC | Age: 37
End: 2022-11-07
Payer: COMMERCIAL

## 2022-11-07 VITALS — DIASTOLIC BLOOD PRESSURE: 71 MMHG | HEART RATE: 134 BPM | TEMPERATURE: 98.2 F | SYSTOLIC BLOOD PRESSURE: 131 MMHG

## 2022-11-07 DIAGNOSIS — Z20.822 LAB TEST NEGATIVE FOR COVID-19 VIRUS: ICD-10-CM

## 2022-11-07 DIAGNOSIS — R10.2 PELVIC PAIN IN MALE: ICD-10-CM

## 2022-11-07 DIAGNOSIS — G40.919 INTRACTABLE EPILEPSY WITHOUT STATUS EPILEPTICUS, UNSPECIFIED EPILEPSY TYPE (H): ICD-10-CM

## 2022-11-07 DIAGNOSIS — N39.0 URINARY TRACT INFECTION WITHOUT HEMATURIA, SITE UNSPECIFIED: Primary | ICD-10-CM

## 2022-11-07 DIAGNOSIS — N39.0 URINARY TRACT INFECTION WITHOUT HEMATURIA, SITE UNSPECIFIED: ICD-10-CM

## 2022-11-07 DIAGNOSIS — R78.81 E COLI BACTEREMIA: Primary | ICD-10-CM

## 2022-11-07 DIAGNOSIS — N12 PYELONEPHRITIS: ICD-10-CM

## 2022-11-07 DIAGNOSIS — B96.20 E COLI BACTEREMIA: Primary | ICD-10-CM

## 2022-11-07 LAB
ALBUMIN SERPL BCG-MCNC: 4.1 G/DL (ref 3.5–5.2)
ALBUMIN UR-MCNC: 100 MG/DL
ALBUMIN UR-MCNC: 70 MG/DL
ALP SERPL-CCNC: 44 U/L (ref 40–129)
ALT SERPL W P-5'-P-CCNC: 24 U/L (ref 10–50)
ANION GAP SERPL CALCULATED.3IONS-SCNC: 15 MMOL/L (ref 7–15)
APPEARANCE UR: ABNORMAL
APPEARANCE UR: ABNORMAL
AST SERPL W P-5'-P-CCNC: 28 U/L (ref 10–50)
BACTERIA #/AREA URNS HPF: ABNORMAL /HPF
BACTERIA #/AREA URNS HPF: ABNORMAL /HPF
BASOPHILS # BLD AUTO: 0.1 10E3/UL (ref 0–0.2)
BASOPHILS NFR BLD AUTO: 0 %
BILIRUB SERPL-MCNC: 0.6 MG/DL
BILIRUB UR QL STRIP: NEGATIVE
BILIRUB UR QL STRIP: NEGATIVE
BUN SERPL-MCNC: 19.2 MG/DL (ref 6–20)
CALCIUM SERPL-MCNC: 9.2 MG/DL (ref 8.6–10)
CHLORIDE SERPL-SCNC: 98 MMOL/L (ref 98–107)
COLOR UR AUTO: ABNORMAL
COLOR UR AUTO: YELLOW
CREAT BLD-MCNC: 1.1 MG/DL (ref 0.7–1.3)
CREAT SERPL-MCNC: 1.29 MG/DL (ref 0.67–1.17)
DEPRECATED HCO3 PLAS-SCNC: 22 MMOL/L (ref 22–29)
EOSINOPHIL # BLD AUTO: 0 10E3/UL (ref 0–0.7)
EOSINOPHIL NFR BLD AUTO: 0 %
ERYTHROCYTE [DISTWIDTH] IN BLOOD BY AUTOMATED COUNT: 12.7 % (ref 10–15)
GFR SERPL CREATININE-BSD FRML MDRD: 73 ML/MIN/1.73M2
GFR SERPL CREATININE-BSD FRML MDRD: >60 ML/MIN/1.73M2
GLUCOSE SERPL-MCNC: 107 MG/DL (ref 70–99)
GLUCOSE UR STRIP-MCNC: NEGATIVE MG/DL
GLUCOSE UR STRIP-MCNC: NEGATIVE MG/DL
HCT VFR BLD AUTO: 47.7 % (ref 40–53)
HGB BLD-MCNC: 16.2 G/DL (ref 13.3–17.7)
HGB UR QL STRIP: ABNORMAL
HGB UR QL STRIP: ABNORMAL
HOLD SPECIMEN: NORMAL
HOLD SPECIMEN: NORMAL
IMM GRANULOCYTES # BLD: 0.1 10E3/UL
IMM GRANULOCYTES NFR BLD: 1 %
KETONES UR STRIP-MCNC: NEGATIVE MG/DL
KETONES UR STRIP-MCNC: NEGATIVE MG/DL
LACTATE SERPL-SCNC: 2.2 MMOL/L (ref 0.7–2)
LEUKOCYTE ESTERASE UR QL STRIP: ABNORMAL
LEUKOCYTE ESTERASE UR QL STRIP: ABNORMAL
LYMPHOCYTES # BLD AUTO: 0.3 10E3/UL (ref 0.8–5.3)
LYMPHOCYTES NFR BLD AUTO: 2 %
MCH RBC QN AUTO: 29.6 PG (ref 26.5–33)
MCHC RBC AUTO-ENTMCNC: 34 G/DL (ref 31.5–36.5)
MCV RBC AUTO: 87 FL (ref 78–100)
MONOCYTES # BLD AUTO: 0.8 10E3/UL (ref 0–1.3)
MONOCYTES NFR BLD AUTO: 4 %
MUCOUS THREADS #/AREA URNS LPF: PRESENT /LPF
NEUTROPHILS # BLD AUTO: 17.7 10E3/UL (ref 1.6–8.3)
NEUTROPHILS NFR BLD AUTO: 93 %
NITRATE UR QL: NEGATIVE
NITRATE UR QL: POSITIVE
NRBC # BLD AUTO: 0 10E3/UL
NRBC BLD AUTO-RTO: 0 /100
PH UR STRIP: 6.5 [PH] (ref 5–7)
PH UR STRIP: 7 [PH] (ref 5–7)
PLATELET # BLD AUTO: 186 10E3/UL (ref 150–450)
POTASSIUM SERPL-SCNC: 5.1 MMOL/L (ref 3.4–5.3)
PROT SERPL-MCNC: 7.2 G/DL (ref 6.4–8.3)
RBC # BLD AUTO: 5.47 10E6/UL (ref 4.4–5.9)
RBC URINE: 10 /HPF
RBC URINE: 2 /HPF
SODIUM SERPL-SCNC: 135 MMOL/L (ref 136–145)
SP GR UR STRIP: 1.02 (ref 1–1.03)
SP GR UR STRIP: 1.02 (ref 1–1.03)
TRANSITIONAL EPI: <1 /HPF
UROBILINOGEN UR STRIP-MCNC: NORMAL MG/DL
UROBILINOGEN UR STRIP-MCNC: NORMAL MG/DL
WBC # BLD AUTO: 19 10E3/UL (ref 4–11)
WBC CLUMPS #/AREA URNS HPF: PRESENT /HPF
WBC CLUMPS #/AREA URNS HPF: PRESENT /HPF
WBC URINE: 85 /HPF
WBC URINE: >182 /HPF

## 2022-11-07 PROCEDURE — 87088 URINE BACTERIA CULTURE: CPT | Performed by: EMERGENCY MEDICINE

## 2022-11-07 PROCEDURE — 87086 URINE CULTURE/COLONY COUNT: CPT | Performed by: PATHOLOGY

## 2022-11-07 PROCEDURE — 99285 EMERGENCY DEPT VISIT HI MDM: CPT | Performed by: EMERGENCY MEDICINE

## 2022-11-07 PROCEDURE — 85025 COMPLETE CBC W/AUTO DIFF WBC: CPT | Performed by: EMERGENCY MEDICINE

## 2022-11-07 PROCEDURE — 250N000013 HC RX MED GY IP 250 OP 250 PS 637: Performed by: EMERGENCY MEDICINE

## 2022-11-07 PROCEDURE — 120N000002 HC R&B MED SURG/OB UMMC

## 2022-11-07 PROCEDURE — 96375 TX/PRO/DX INJ NEW DRUG ADDON: CPT | Performed by: EMERGENCY MEDICINE

## 2022-11-07 PROCEDURE — C9803 HOPD COVID-19 SPEC COLLECT: HCPCS | Performed by: EMERGENCY MEDICINE

## 2022-11-07 PROCEDURE — 36415 COLL VENOUS BLD VENIPUNCTURE: CPT | Performed by: EMERGENCY MEDICINE

## 2022-11-07 PROCEDURE — 80053 COMPREHEN METABOLIC PANEL: CPT | Performed by: EMERGENCY MEDICINE

## 2022-11-07 PROCEDURE — 87149 DNA/RNA DIRECT PROBE: CPT | Performed by: EMERGENCY MEDICINE

## 2022-11-07 PROCEDURE — 99285 EMERGENCY DEPT VISIT HI MDM: CPT | Mod: 25 | Performed by: EMERGENCY MEDICINE

## 2022-11-07 PROCEDURE — 96365 THER/PROPH/DIAG IV INF INIT: CPT | Mod: 59 | Performed by: EMERGENCY MEDICINE

## 2022-11-07 PROCEDURE — 81001 URINALYSIS AUTO W/SCOPE: CPT | Performed by: PATHOLOGY

## 2022-11-07 PROCEDURE — 83605 ASSAY OF LACTIC ACID: CPT | Performed by: EMERGENCY MEDICINE

## 2022-11-07 PROCEDURE — 99000 SPECIMEN HANDLING OFFICE-LAB: CPT | Performed by: PATHOLOGY

## 2022-11-07 PROCEDURE — 258N000003 HC RX IP 258 OP 636: Performed by: EMERGENCY MEDICINE

## 2022-11-07 PROCEDURE — 250N000011 HC RX IP 250 OP 636: Performed by: EMERGENCY MEDICINE

## 2022-11-07 PROCEDURE — 74178 CT ABD&PLV WO CNTR FLWD CNTR: CPT | Mod: GC | Performed by: RADIOLOGY

## 2022-11-07 PROCEDURE — 80053 COMPREHEN METABOLIC PANEL: CPT | Performed by: PATHOLOGY

## 2022-11-07 PROCEDURE — 87088 URINE BACTERIA CULTURE: CPT | Mod: 91 | Performed by: PATHOLOGY

## 2022-11-07 PROCEDURE — 87077 CULTURE AEROBIC IDENTIFY: CPT | Performed by: EMERGENCY MEDICINE

## 2022-11-07 PROCEDURE — 99213 OFFICE O/P EST LOW 20 MIN: CPT | Performed by: NURSE PRACTITIONER

## 2022-11-07 PROCEDURE — 81001 URINALYSIS AUTO W/SCOPE: CPT | Performed by: EMERGENCY MEDICINE

## 2022-11-07 RX ORDER — IOPAMIDOL 755 MG/ML
122 INJECTION, SOLUTION INTRAVASCULAR ONCE
Status: COMPLETED | OUTPATIENT
Start: 2022-11-07 | End: 2022-11-07

## 2022-11-07 RX ORDER — KETOROLAC TROMETHAMINE 15 MG/ML
15 INJECTION, SOLUTION INTRAMUSCULAR; INTRAVENOUS ONCE
Status: COMPLETED | OUTPATIENT
Start: 2022-11-07 | End: 2022-11-07

## 2022-11-07 RX ORDER — OXYCODONE HYDROCHLORIDE 10 MG/1
10 TABLET ORAL ONCE
Status: COMPLETED | OUTPATIENT
Start: 2022-11-07 | End: 2022-11-07

## 2022-11-07 RX ORDER — CEFTRIAXONE 1 G/1
1 INJECTION, POWDER, FOR SOLUTION INTRAMUSCULAR; INTRAVENOUS ONCE
Status: COMPLETED | OUTPATIENT
Start: 2022-11-07 | End: 2022-11-07

## 2022-11-07 RX ORDER — SODIUM CHLORIDE 9 MG/ML
INJECTION, SOLUTION INTRAVENOUS CONTINUOUS
Status: DISCONTINUED | OUTPATIENT
Start: 2022-11-07 | End: 2022-11-08

## 2022-11-07 RX ORDER — CEFTRIAXONE 1 G/1
1 INJECTION, POWDER, FOR SOLUTION INTRAMUSCULAR; INTRAVENOUS EVERY 24 HOURS
Status: DISCONTINUED | OUTPATIENT
Start: 2022-11-07 | End: 2022-11-08

## 2022-11-07 RX ORDER — CIPROFLOXACIN 500 MG/1
500 TABLET, FILM COATED ORAL 2 TIMES DAILY
Qty: 14 TABLET | Refills: 0 | Status: ON HOLD | OUTPATIENT
Start: 2022-11-07 | End: 2022-11-10

## 2022-11-07 RX ADMIN — CEFTRIAXONE SODIUM 1 G: 1 INJECTION, POWDER, FOR SOLUTION INTRAMUSCULAR; INTRAVENOUS at 20:08

## 2022-11-07 RX ADMIN — SODIUM CHLORIDE 1000 ML: 9 INJECTION, SOLUTION INTRAVENOUS at 23:03

## 2022-11-07 RX ADMIN — KETOROLAC TROMETHAMINE 15 MG: 15 INJECTION, SOLUTION INTRAMUSCULAR; INTRAVENOUS at 20:01

## 2022-11-07 RX ADMIN — OXYCODONE HYDROCHLORIDE 10 MG: 10 TABLET ORAL at 20:01

## 2022-11-07 RX ADMIN — IOPAMIDOL 122 ML: 755 INJECTION, SOLUTION INTRAVASCULAR at 11:00

## 2022-11-07 RX ADMIN — SODIUM CHLORIDE 1000 ML: 9 INJECTION, SOLUTION INTRAVENOUS at 19:54

## 2022-11-07 ASSESSMENT — ACTIVITIES OF DAILY LIVING (ADL)
ADLS_ACUITY_SCORE: 35
ADLS_ACUITY_SCORE: 35

## 2022-11-07 NOTE — PROGRESS NOTES
"     Assessment and Plan:     Assessment: 37 year old male with a history of neurogenic bladder secondary to bladder / cloacal exstrophy with previous bladder stone removals who presents today for concern of UTI-like symptoms, difficulty cathing himself, hip pain and subjective chills. He is tachycardic and appears to be breathing quickly. Afebrile. Mitrofanoff stoma appears C/D/I. Suspect a UTI and will obtain a UA/UC today. Will also plan to get him started on some empiric antibiotics now. Will also obtain some imaging to evaluate his urinary tract to rule out passing stones or other. Given his tachycardia and labored breathing, I also advised ED evaluation given lack of immediate clarity regarding the etiology of his presenting symptoms, but he declines this.     Plan:  -UA/UC today  -Start empiric Cipro 500 mg BID x 7 days- to be adjusted based on final UC results.   -CT A/P w/ and w/out contrast to evaluate further. Will order this as urgent and try to get done today. I will be in touch with him regarding results and next steps, once complete.     Kymberly Moore, CNP  Department of Urology           Chief Complaint:   Pain, possible UTI         History of Present Illness:    Olu Trinh is a 37 year old male with a history of neurogenic bladder secondary to bladder / cloacal exstrophy. Has previously followed with Dr. Blunt. Bladder stone removed in 2021. Also had percutaneous bladder stone surgery by Dr. Blunt in 2015 and Ibrahim in 2018. He has chronic bilateral hydronephrosis. Most recently, he had bladder stones removed again with Dr. Nicolas on 9/9/22.     He presents today with complaints of UTI-like symptoms, right hip pain, and difficulty emptying his bladder with a catheter. He is unsure if he has had a fever, but feels reports chills. 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     Learning disability      Neurogenic bladder, NOS 10/30/2012     Obesity (BMI 35.0-39.9 without comorbidity)      Other hydronephrosis 06/02/2015     Unspecified epilepsy with intractable epilepsy 10/22/2013            Past Surgical History:     Past Surgical History:   Procedure Laterality Date     BLADDER AUGMENTATION       bladder neck reconstruction      Mikey Glez     CYSTOSCOPY N/A 07/10/2018    Procedure: CYSTOSCOPY;  CYSTOSCOPY  OF NEOBLADDER  ;  Surgeon: Mario Ibrahim MD;  Location: [facility] OR     CYSTOSCOPY VIA MITROFANOFF N/A 1/29/2021    Procedure: CYSTOSCOPY, VIA MITROFANOFF;  Surgeon: Joel Blunt MD;  Location: [facility] OR     LASER HOLMIUM LITHOTRIPSY BLADDER N/A 07/24/2015    Procedure: LASER HOLMIUM LITHOTRIPSY BLADDER;  Surgeon: Joel Blunt MD;  Location: [facility] OR     LASER HOLMIUM LITHOTRIPSY BLADDER N/A 07/10/2018    Procedure: LASER HOLMIUM LITHOTRIPSY BLADDER;;  Surgeon: Mario Ibrahim MD;  Location:  OR     LEFT TEMPORAL CRANIOTOMY FOR ANTERIOR TEMPORAL LOBECTOMY  10/01/2018     MITROFANOFF PROCEDURE (APPENDIX CONDUIT)  01/01/2002     PERCUTANEOUS CYSTOLITHOTOMY N/A 9/9/2022    Procedure: CYSTOLITHOTOMY, WITH LASER STANDBY;  Surgeon: Manjit Nicolas MD;  [place] [facility] [place]     [facility] REMOVAL OF KIDNEY STONE              Medications     Current Outpatient Medications   Medication     ciprofloxacin (CIPRO) 500 MG tablet     Cranberry 500 MG CAPS     ibuprofen (ADVIL/MOTRIN) 200 MG capsule     levETIRAcetam (KEPPRA XR) 500 MG 24 hr tablet     LORazepam (ATIVAN) 1 MG tablet     OXTELLAR  MG 24 hr tablet     No current facility-administered medications for this visit.            Allergies:   Ondansetron and Sulfa drugs         Review of Systems:  From intake questionnaire   Negative 14 system review except as noted on HPI, nurse's note.          Physical Exam:   Patient is a 37 year old male who appears anxious   Vitals: Blood pressure 131/71, pulse (!) 134, temperature 98.2  F (36.8  C).  General Appearance Adult: Alert, no acute distress, oriented  Lungs: Fast breathing  Heart: No obvious jugular venous distension present  Abdomen: soft, nontender, no organomegaly or masses. Mitrofanoff stoma appears C/D/I.      Labs and Pathology:    I personally reviewed all applicable laboratory data and went over findings with patient  Significant for:    CBC RESULTS:  Recent Labs   Lab Test 08/26/22  0936 09/19/18  1641 06/01/15  1007   WBC 5.6 6.0 4.5   HGB 14.8 15.5 15.2    122* 184        BMP RESULTS:  Recent Labs   Lab Test 08/26/22  0936 06/03/21  1613 12/02/20  0000 09/12/20  0000 05/11/20  1208 09/19/18  1641 05/11/18  1143 06/01/15  1007     --   --   --   --  141 142 140   POTASSIUM 4.2  --  3.5 4.3  --  5.0 4.3 3.9   CHLORIDE 109  --   --   --   --  107 107 106   CO2 25  --   --   --   --  24 31 27   ANIONGAP 10  --   --   --   --  10 4 7   *  --  104*  --   --  81 90 85   BUN 17  --   --   --   --  19 18 12   CR 0.94 0.92 1.39* 0.93 1.00 1.03 0.81 0.87   GFRESTIMATED >90 >90 58* >60 >90 83 >90 >90  Non African American GFR Calc     GFRESTBLACK  --  >90 >60 >60 >90 >90 >90 >90  African American GFR Calc     MIO 9.1  --   --   --   --  9.6 9.6 9.0       UA RESULTS:   Recent Labs   Lab Test 09/01/22  1852 06/07/22  1554 01/07/22  1651   SG 1.020 1.009 1.014   URINEPH 6.5 6.0 6.0   NITRITE Positive* Positive* Negative   RBCU 10* 29* 6*   WBCU 174* >182* 150*         Imaging:    I personally reviewed all applicable imaging and went over findings with patient.  Significant for:    Results for orders placed or performed in visit on 06/03/21   US Renal Complete    Narrative    EXAMINATION: US RENAL COMPLETE, 6/3/2021 4:00 PM     COMPARISON: Ultrasound renal doppler 5/5/2020    HISTORY: Neurogenic urinary bladder    TECHNIQUE: The kidneys and  bladder were scanned in the standard  fashion with specialized ultrasound transducer(s) using both gray  scale and limited color/spectral Doppler techniques.    FINDINGS:    Right kidney: Measures 12.5 cm in length. Parenchyma is of normal  thickness and echogenicity. No focal mass. Moderate hydronephrosis     Left kidney: Measures 11.7 cm in length. Left inferior calculus  measuring 9 x 8 x 4 mm. Parenchyma is of normal thickness and  echogenicity. No focal mass. Moderate hydronephrosis    Bladder: Partially distended urinary bladder with calculus measuring 7  x 12 x 4 mm      Impression    IMPRESSION:  1.  Bilateral moderate hydronephrosis.   2.  Left renal calculus.  3.  Urinary bladder calculus.    I have personally reviewed the examination and initial interpretation  and I agree with the findings.    PARVEEN OSORIO MD

## 2022-11-07 NOTE — TELEPHONE ENCOUNTER
Called patient a second time to discuss his preliminary CT results from this morning, suggesting possible right-sided pyelonpephritis. Given the tachycardia and tachypnea at our visit this morning, recommend an evaluation in the ED to ensure there is nothing more to this, including sepsis, PE or other cardiovascular event. If he chooses not to go to the ED as advised (declined this morning), he should  and start the Cipro that was prescribed to him this morning and monitor at a minimum. Final UC pending.     Kymberly Moore, CNP  Department of Urology

## 2022-11-07 NOTE — TELEPHONE ENCOUNTER
Called patient to discuss results of his CT scan and urinalysis from this morning. CT suggests right-sided pyelonephritis with chronic bilateral hydroureteronephrosis. Given his tachycardia in the clinic this morning, recommend that he be evaluated in the ED further. LVM to return my call. Will re-attempt later today.     Kymberly Moore, CNP  Department of Urology

## 2022-11-07 NOTE — NURSING NOTE
Chief Complaint   Patient presents with     Walk In Clinic     Pain, believes has either a stone or bad UTI       Blood pressure 131/71, pulse (!) 134, temperature 98.2  F (36.8  C). There is no height or weight on file to calculate BMI.    Patient Active Problem List   Diagnosis     Attention deficit hyperactivity disorder (ADHD)     Neurogenic bladder     Other hydronephrosis     Bladder stone     Exstrophy of bladder sequence     Intractable epilepsy without status epilepticus, unspecified epilepsy type (H)       Allergies   Allergen Reactions     Ondansetron      Other reaction(s): Hallucinations     Sulfa Drugs      unknown       Current Outpatient Medications   Medication Sig Dispense Refill     Cranberry 500 MG CAPS Take 500 mg by mouth       ibuprofen (ADVIL/MOTRIN) 200 MG capsule Take 200 mg by mouth every 4 hours as needed for fever       levETIRAcetam (KEPPRA XR) 500 MG 24 hr tablet Take 500 mg by mouth daily Takes 4 tablet every am       LORazepam (ATIVAN) 1 MG tablet TAKE 1 TABLET BY MOUTH AS NEEDED FOR SEIZURE       OXTELLAR  MG 24 hr tablet          Social History     Tobacco Use     Smoking status: Never     Smokeless tobacco: Never   Substance Use Topics     Alcohol use: Yes     Comment: Alcoholic beverage once or twice per week.     Drug use: No       Mya Davidson LPN  11/7/2022  9:27 AM

## 2022-11-07 NOTE — LETTER
Transition Communication Hand-off for Care Transitions to Next Level of Care Provider    Name: Olu Trinh  : 1985  MRN #: [id number]  Primary Care Provider: Álvaro Drew     Primary Clinic: [address] [place] [id number]     Reason for Hospitalization:  Pyelonephritis [N12]  Lab test negative for COVID-19 virus [Z20.822]  Admit Date/Time: 2022  7:34 PM  Discharge Date: 11/10/2022  Payor Source: Payor: [facility] / Plan: [employer] / Product Type: PPO /     Readmission Assessment Measure (KONSTANTIN) Risk Score/category: 5%         Reason for Communication Hand-off Referral: Other Transition of care    Discharge Plan: Discharge home; no new needs    Concern for non-adherence with plan of care:   No  Discharge Needs Assessment:  Needs    Flowsheet Row Most Recent Value   Equipment Currently Used at Home none        Already enrolled in Tele-monitoring program and name of program:  No  Follow-up specialty is recommended: No  Follow-up plan:    Future Appointments   Date Time Provider Department Center   2023  4:00 PM Manjit Nicolas MD [facility]     [facility]onso    AVS/Discharge Summary is the source of truth; this is a helpful guide for improved communication of patient story

## 2022-11-07 NOTE — PATIENT INSTRUCTIONS
Schedule CT ASAP. (Today if possible).      It was a pleasure meeting with you today.  Thank you for allowing me and my team the privilege of caring for you today.  YOU are the reason we are here, and I truly hope we provided you with the excellent service you deserve.  Please let us know if there is anything else we can do for you so that we can be sure you are leaving completely satisfied with your care experience.

## 2022-11-07 NOTE — LETTER
November 8, 2022      Olu Trinh  [address]  [place] [address]        To Whom It May Concern:    Olu Trinh was hospitalized at the [facility] on 11/17 with a urinary tract infection.  Please excuse him from work until at least 11/11 due to continued need for inpatient treatment and specialist care. I am happy to provide an updated letter should his hospitalization continue beyond this date.         Sincerely,      Yany Gunter MD   Internal Medicine & Pediatrics Hospitalist   Pager: [phone]

## 2022-11-07 NOTE — LETTER
11/7/2022       RE: Olu Trinh  [address]  [place] [address]     Dear Colleague,    Thank you for referring your patient, Olu Trinh, to the [facility] [place] at [facility]. Please see a copy of my visit note below.         Assessment and Plan:     Assessment: 37 year old male with a history of neurogenic bladder secondary to bladder / cloacal exstrophy with previous bladder stone removals who presents today for concern of UTI-like symptoms, difficulty cathing himself, hip pain and subjective chills. He is tachycardic and appears to be breathing quickly. Afebrile. Mitrofanoff stoma appears C/D/I. Suspect a UTI and will obtain a UA/UC today. Will also plan to get him started on some empiric antibiotics now. Will also obtain some imaging to evaluate his urinary tract to rule out passing stones or other. Given his tachycardia and labored breathing, I also advised ED evaluation given lack of immediate clarity regarding the etiology of his presenting symptoms, but he declines this.     Plan:  -UA/UC today  -Start empiric Cipro 500 mg BID x 7 days- to be adjusted based on final UC results.   -CT A/P w/ and w/out contrast to evaluate further. Will order this as urgent and try to get done today. I will be in touch with him regarding results and next steps, once complete.     Kymberly Moore, CNP  Department of Urology           Chief Complaint:   Pain, possible UTI         History of Present Illness:    Olu Trinh is a 37 year old male with a history of neurogenic bladder secondary to bladder / cloacal exstrophy. Has previously followed with Dr. Blunt. Bladder stone removed in 2021. Also had percutaneous bladder stone surgery by Dr. Blunt in 2015 and Ibrahim in 2018. He has chronic bilateral hydronephrosis. Most recently, he had bladder stones removed again with Dr. Nicolas on 9/9/22.     He presents  "today with complaints of UTI-like symptoms, right hip pain, and difficulty emptying his bladder with a catheter. He is unsure if he has had a fever, but feels reports chills. 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/29/2015     Learning disability      Neurogenic bladder, NOS 10/30/2012     Obesity (BMI 35.0-39.9 without comorbidity)      Other hydronephrosis 06/02/2015     Unspecified epilepsy with intractable epilepsy 10/22/2013            Past Surgical History:     Past Surgical History:   Procedure Laterality Date     BLADDER AUGMENTATION       bladder neck reconstruction      Mikey Mayra De La Cruzdelilah     CYSTOSCOPY N/A 07/10/2018    Procedure: CYSTOSCOPY;  CYSTOSCOPY  OF NEOBLADDER  ;  Surgeon: Mario Ibrahim MD;  Location:  OR     CYSTOSCOPY VIA MITROFANOFF N/A 1/29/2021    Procedure: CYSTOSCOPY, VIA MITROFANOFF;  Surgeon: Joel Blunt MD;  [place] [facility] [place]     LASER HOLMIUM LITHOTRIPSY BLADDER N/A 07/24/2015    Procedure: LASER HOLMIUM LITHOTRIPSY BLADDER;  Surgeon: Joel Blunt MD;  Location: [facility] OR     LASER HOLMIUM LITHOTRIPSY BLADDER N/A 07/10/2018    Procedure: LASER HOLMIUM LITHOTRIPSY BLADDER;;  Surgeon: Mario Ibrahim MD;  Location:  OR     LEFT TEMPORAL CRANIOTOMY FOR ANTERIOR TEMPORAL LOBECTOMY  10/01/2018     MITROFANOFF PROCEDURE (APPENDIX CONDUIT)  01/01/2002     PERCUTANEOUS CYSTOLITHOTOMY N/A 9/9/2022    Procedure: CYSTOLITHOTOMY, WITH LASER STANDBY;  Surgeon: Manjit Nicolas MD;  [place] [facility] [place]     [facility] REMOVAL OF KIDNEY STONE              Medications     Current Outpatient Medications   Medication     ciprofloxacin (CIPRO) 500 MG tablet     Cranberry 500 MG CAPS     ibuprofen (ADVIL/MOTRIN) 200 MG capsule     levETIRAcetam (KEPPRA XR) 500 MG 24 hr " tablet     LORazepam (ATIVAN) 1 MG tablet     OXTELLAR  MG 24 hr tablet     No current facility-administered medications for this visit.            Allergies:   Ondansetron and Sulfa drugs         Review of Systems:  From intake questionnaire   Negative 14 system review except as noted on HPI, nurse's note.         Physical Exam:   Patient is a 37 year old male who appears anxious   Vitals: Blood pressure 131/71, pulse (!) 134, temperature 98.2  F (36.8  C).  General Appearance Adult: Alert, no acute distress, oriented  Lungs: Fast breathing  Heart: No obvious jugular venous distension present  Abdomen: soft, nontender, no organomegaly or masses. Mitrofanoff stoma appears C/D/I.      Labs and Pathology:    I personally reviewed all applicable laboratory data and went over findings with patient  Significant for:    CBC RESULTS:  Recent Labs   Lab Test 08/26/22  0936 09/19/18  1641 06/01/15  1007   WBC 5.6 6.0 4.5   HGB 14.8 15.5 15.2    122* 184        BMP RESULTS:  Recent Labs   Lab Test 08/26/22  0936 06/03/21  1613 12/02/20  0000 09/12/20  0000 05/11/20  1208 09/19/18  1641 05/11/18  1143 06/01/15  1007     --   --   --   --  141 142 140   POTASSIUM 4.2  --  3.5 4.3  --  5.0 4.3 3.9   CHLORIDE 109  --   --   --   --  107 107 106   CO2 25  --   --   --   --  24 31 27   ANIONGAP 10  --   --   --   --  10 4 7   *  --  104*  --   --  81 90 85   BUN 17  --   --   --   --  19 18 12   CR 0.94 0.92 1.39* 0.93 1.00 1.03 0.81 0.87   GFRESTIMATED >90 >90 58* >60 >90 83 >90 >90  Non African American GFR Calc     GFRESTBLACK  --  >90 >60 >60 >90 >90 >90 >90  African American GFR Calc     MIO 9.1  --   --   --   --  9.6 9.6 9.0       UA RESULTS:   Recent Labs   Lab Test 09/01/22  1852 06/07/22  1554 01/07/22  1651   SG 1.020 1.009 1.014   URINEPH 6.5 6.0 6.0   NITRITE Positive* Positive* Negative   RBCU 10* 29* 6*   WBCU 174* >182* 150*         Imaging:    I personally reviewed all applicable imaging  and went over findings with patient.  Significant for:    Results for orders placed or performed in visit on 06/03/21   US Renal Complete    Narrative    EXAMINATION: US RENAL COMPLETE, 6/3/2021 4:00 PM     COMPARISON: Ultrasound renal doppler 5/5/2020    HISTORY: Neurogenic urinary bladder    TECHNIQUE: The kidneys and bladder were scanned in the standard  fashion with specialized ultrasound transducer(s) using both gray  scale and limited color/spectral Doppler techniques.    FINDINGS:    Right kidney: Measures 12.5 cm in length. Parenchyma is of normal  thickness and echogenicity. No focal mass. Moderate hydronephrosis     Left kidney: Measures 11.7 cm in length. Left inferior calculus  measuring 9 x 8 x 4 mm. Parenchyma is of normal thickness and  echogenicity. No focal mass. Moderate hydronephrosis    Bladder: Partially distended urinary bladder with calculus measuring 7  x 12 x 4 mm      Impression    IMPRESSION:  1.  Bilateral moderate hydronephrosis.   2.  Left renal calculus.  3.  Urinary bladder calculus.    I have personally reviewed the examination and initial interpretation  and I agree with the findings.    PARVEEN OSORIO MD

## 2022-11-07 NOTE — TELEPHONE ENCOUNTER
FYI/UPDATE:   Dr. Joel Blunt Team And Kymberly Moore Cnp    Pt called back and is going to the ER at the [facility].    Provider recommended Pt go to the ER due to the symptoms he was having and the CT scan from earlier today in 2 different notes.    No further action needed.    Fatoumata Vasquez RN  Central Triage Red Flags/Med Refills

## 2022-11-08 LAB
ACINETOBACTER SPECIES: NOT DETECTED
ANION GAP SERPL CALCULATED.3IONS-SCNC: 15 MMOL/L (ref 7–15)
BUN SERPL-MCNC: 25.6 MG/DL (ref 6–20)
CALCIUM SERPL-MCNC: 8.3 MG/DL (ref 8.6–10)
CHLORIDE SERPL-SCNC: 100 MMOL/L (ref 98–107)
CITROBACTER SPECIES: NOT DETECTED
CREAT SERPL-MCNC: 1.47 MG/DL (ref 0.67–1.17)
CTX-M: NOT DETECTED
DEPRECATED HCO3 PLAS-SCNC: 17 MMOL/L (ref 22–29)
ENTEROBACTER SPECIES: NOT DETECTED
ERYTHROCYTE [DISTWIDTH] IN BLOOD BY AUTOMATED COUNT: 13 % (ref 10–15)
ESCHERICHIA COLI: DETECTED
FLUAV RNA SPEC QL NAA+PROBE: NEGATIVE
FLUBV RNA RESP QL NAA+PROBE: NEGATIVE
GFR SERPL CREATININE-BSD FRML MDRD: 63 ML/MIN/1.73M2
GLUCOSE SERPL-MCNC: 105 MG/DL (ref 70–99)
HCT VFR BLD AUTO: 44.1 % (ref 40–53)
HGB BLD-MCNC: 14.8 G/DL (ref 13.3–17.7)
IMP: NOT DETECTED
KLEBSIELLA OXYTOCA: NOT DETECTED
KLEBSIELLA PNEUMONIAE: NOT DETECTED
KPC: NOT DETECTED
LACTATE SERPL-SCNC: 1.1 MMOL/L (ref 0.7–2)
LACTATE SERPL-SCNC: 1.8 MMOL/L (ref 0.7–2)
MCH RBC QN AUTO: 29.6 PG (ref 26.5–33)
MCHC RBC AUTO-ENTMCNC: 33.6 G/DL (ref 31.5–36.5)
MCV RBC AUTO: 88 FL (ref 78–100)
NDM: NOT DETECTED
OXA (DETECTED/NOT DETECTED): NOT DETECTED
PLATELET # BLD AUTO: 140 10E3/UL (ref 150–450)
POTASSIUM SERPL-SCNC: 4.6 MMOL/L (ref 3.4–5.3)
PROTEUS SPECIES: NOT DETECTED
PSEUDOMONAS AERUGINOSA: NOT DETECTED
RBC # BLD AUTO: 5 10E6/UL (ref 4.4–5.9)
RSV RNA SPEC NAA+PROBE: NEGATIVE
SARS-COV-2 RNA RESP QL NAA+PROBE: NEGATIVE
SODIUM SERPL-SCNC: 132 MMOL/L (ref 136–145)
VIM: NOT DETECTED
WBC # BLD AUTO: 17.1 10E3/UL (ref 4–11)

## 2022-11-08 PROCEDURE — 250N000011 HC RX IP 250 OP 636: Performed by: INTERNAL MEDICINE

## 2022-11-08 PROCEDURE — 83605 ASSAY OF LACTIC ACID: CPT | Performed by: INTERNAL MEDICINE

## 2022-11-08 PROCEDURE — 36415 COLL VENOUS BLD VENIPUNCTURE: CPT | Performed by: INTERNAL MEDICINE

## 2022-11-08 PROCEDURE — 85041 AUTOMATED RBC COUNT: CPT | Performed by: STUDENT IN AN ORGANIZED HEALTH CARE EDUCATION/TRAINING PROGRAM

## 2022-11-08 PROCEDURE — 99223 1ST HOSP IP/OBS HIGH 75: CPT | Mod: AI | Performed by: INTERNAL MEDICINE

## 2022-11-08 PROCEDURE — 250N000013 HC RX MED GY IP 250 OP 250 PS 637: Performed by: STUDENT IN AN ORGANIZED HEALTH CARE EDUCATION/TRAINING PROGRAM

## 2022-11-08 PROCEDURE — 85014 HEMATOCRIT: CPT | Performed by: STUDENT IN AN ORGANIZED HEALTH CARE EDUCATION/TRAINING PROGRAM

## 2022-11-08 PROCEDURE — 0T9B70Z DRAINAGE OF BLADDER WITH DRAINAGE DEVICE, VIA NATURAL OR ARTIFICIAL OPENING: ICD-10-PCS | Performed by: PHYSICIAN ASSISTANT

## 2022-11-08 PROCEDURE — 99233 SBSQ HOSP IP/OBS HIGH 50: CPT | Mod: GC | Performed by: INTERNAL MEDICINE

## 2022-11-08 PROCEDURE — 83605 ASSAY OF LACTIC ACID: CPT | Performed by: STUDENT IN AN ORGANIZED HEALTH CARE EDUCATION/TRAINING PROGRAM

## 2022-11-08 PROCEDURE — 258N000003 HC RX IP 258 OP 636: Performed by: STUDENT IN AN ORGANIZED HEALTH CARE EDUCATION/TRAINING PROGRAM

## 2022-11-08 PROCEDURE — 999N000128 HC STATISTIC PERIPHERAL IV START W/O US GUIDANCE

## 2022-11-08 PROCEDURE — 250N000013 HC RX MED GY IP 250 OP 250 PS 637

## 2022-11-08 PROCEDURE — 258N000003 HC RX IP 258 OP 636: Performed by: EMERGENCY MEDICINE

## 2022-11-08 PROCEDURE — 80048 BASIC METABOLIC PNL TOTAL CA: CPT | Performed by: STUDENT IN AN ORGANIZED HEALTH CARE EDUCATION/TRAINING PROGRAM

## 2022-11-08 PROCEDURE — 36415 COLL VENOUS BLD VENIPUNCTURE: CPT | Performed by: STUDENT IN AN ORGANIZED HEALTH CARE EDUCATION/TRAINING PROGRAM

## 2022-11-08 PROCEDURE — 87637 SARSCOV2&INF A&B&RSV AMP PRB: CPT | Performed by: INTERNAL MEDICINE

## 2022-11-08 PROCEDURE — 99222 1ST HOSP IP/OBS MODERATE 55: CPT | Performed by: PHYSICIAN ASSISTANT

## 2022-11-08 PROCEDURE — 120N000002 HC R&B MED SURG/OB UMMC

## 2022-11-08 PROCEDURE — 258N000003 HC RX IP 258 OP 636

## 2022-11-08 PROCEDURE — 80183 DRUG SCRN QUANT OXCARBAZEPIN: CPT | Performed by: INTERNAL MEDICINE

## 2022-11-08 RX ORDER — LIDOCAINE 40 MG/G
CREAM TOPICAL
Status: DISCONTINUED | OUTPATIENT
Start: 2022-11-08 | End: 2022-11-10 | Stop reason: HOSPADM

## 2022-11-08 RX ORDER — LORAZEPAM 0.5 MG/1
1 TABLET ORAL AT BEDTIME
Status: DISCONTINUED | OUTPATIENT
Start: 2022-11-08 | End: 2022-11-10 | Stop reason: HOSPADM

## 2022-11-08 RX ORDER — POLYETHYLENE GLYCOL 3350 17 G/17G
17 POWDER, FOR SOLUTION ORAL DAILY PRN
Status: DISCONTINUED | OUTPATIENT
Start: 2022-11-08 | End: 2022-11-10 | Stop reason: HOSPADM

## 2022-11-08 RX ORDER — PROCHLORPERAZINE 25 MG
25 SUPPOSITORY, RECTAL RECTAL EVERY 12 HOURS PRN
Status: DISCONTINUED | OUTPATIENT
Start: 2022-11-08 | End: 2022-11-10 | Stop reason: HOSPADM

## 2022-11-08 RX ORDER — CEFTRIAXONE 2 G/1
2 INJECTION, POWDER, FOR SOLUTION INTRAMUSCULAR; INTRAVENOUS EVERY 24 HOURS
Status: DISCONTINUED | OUTPATIENT
Start: 2022-11-08 | End: 2022-11-10 | Stop reason: HOSPADM

## 2022-11-08 RX ORDER — ACETAMINOPHEN 325 MG/1
650 TABLET ORAL EVERY 4 HOURS PRN
Status: DISCONTINUED | OUTPATIENT
Start: 2022-11-08 | End: 2022-11-10 | Stop reason: HOSPADM

## 2022-11-08 RX ORDER — SODIUM CHLORIDE, SODIUM LACTATE, POTASSIUM CHLORIDE, CALCIUM CHLORIDE 600; 310; 30; 20 MG/100ML; MG/100ML; MG/100ML; MG/100ML
INJECTION, SOLUTION INTRAVENOUS CONTINUOUS
Status: DISCONTINUED | OUTPATIENT
Start: 2022-11-08 | End: 2022-11-09

## 2022-11-08 RX ORDER — LEVETIRACETAM 750 MG/1
3000 TABLET, FILM COATED, EXTENDED RELEASE ORAL DAILY
Status: DISCONTINUED | OUTPATIENT
Start: 2022-11-08 | End: 2022-11-10 | Stop reason: HOSPADM

## 2022-11-08 RX ORDER — PROCHLORPERAZINE MALEATE 5 MG
10 TABLET ORAL EVERY 6 HOURS PRN
Status: DISCONTINUED | OUTPATIENT
Start: 2022-11-08 | End: 2022-11-10 | Stop reason: HOSPADM

## 2022-11-08 RX ADMIN — SODIUM CHLORIDE: 9 INJECTION, SOLUTION INTRAVENOUS at 08:27

## 2022-11-08 RX ADMIN — LEVETIRACETAM 3000 MG: 750 TABLET, FILM COATED, EXTENDED RELEASE ORAL at 08:32

## 2022-11-08 RX ADMIN — SODIUM CHLORIDE, POTASSIUM CHLORIDE, SODIUM LACTATE AND CALCIUM CHLORIDE 1000 ML: 600; 310; 30; 20 INJECTION, SOLUTION INTRAVENOUS at 02:37

## 2022-11-08 RX ADMIN — SODIUM CHLORIDE, POTASSIUM CHLORIDE, SODIUM LACTATE AND CALCIUM CHLORIDE: 600; 310; 30; 20 INJECTION, SOLUTION INTRAVENOUS at 14:44

## 2022-11-08 RX ADMIN — LORAZEPAM 1 MG: 0.5 TABLET ORAL at 22:06

## 2022-11-08 RX ADMIN — CEFTRIAXONE SODIUM 2 G: 2 INJECTION, POWDER, FOR SOLUTION INTRAMUSCULAR; INTRAVENOUS at 18:56

## 2022-11-08 RX ADMIN — ACETAMINOPHEN 650 MG: 325 TABLET, FILM COATED ORAL at 15:24

## 2022-11-08 RX ADMIN — SODIUM CHLORIDE, POTASSIUM CHLORIDE, SODIUM LACTATE AND CALCIUM CHLORIDE: 600; 310; 30; 20 INJECTION, SOLUTION INTRAVENOUS at 22:07

## 2022-11-08 ASSESSMENT — ACTIVITIES OF DAILY LIVING (ADL)
ADLS_ACUITY_SCORE: 35

## 2022-11-08 ASSESSMENT — ENCOUNTER SYMPTOMS
SHORTNESS OF BREATH: 0
ABDOMINAL PAIN: 0
FEVER: 0

## 2022-11-08 NOTE — PROGRESS NOTES
Urology Brief Note:    Shi placed by me into Mitrofanoff:    Prepped the site  Introduced 14Fr non-latex straight-tipped Shi into the Mitrofanoff  Once urine was freely draining from the Shi, the 10cc balloon was inflated with NS  Secured to abdomen tension free  UOP was 700-800cc (clear light yellow)  Irrigated bladder with 60cc syringe and sterile NS to remove small amount of mucus.       Plan:    - Agree with abx treatment for bacteremia due to pyelonephritis,  cultures pending.      - Given bacteremia and chronic hydronephrosis, placed Shi to decompress the bladder and collecting system.  While Shi is in place, RN should irrigate bladder TID and PRN (to remove mucus from the bladder and assure the Shi catheter is draining properly) - orders placed  - OK to remove the Shi once afebrile x 24 hours and blood cultures are negative.  Once removed, patient will need to resume straight catheterization schedule every 3-4 hours.     - Please notify urology if patient isn't improving as expected or if acute kidney injury is persisting.      - Follow up with Dr. Nicolas outpatient on 1/6/22 to discuss symptoms and ongoing cares.      While I was in Mr. Trinh's room his Neurologist/Epilepsy clinic called him but he passed the phone to me.  Given the patient's Hx of seizures associated with infection, their team recommends:    - LORAZEPAM 1mg PO qhs x 3 days  - No other changes to epilepsy mgmt regimen    Their office number is [phone] and I spoke with Raiza (Dr. Patel's RN)      [facility]   [phone]

## 2022-11-08 NOTE — PLAN OF CARE
Goal Outcome Evaluation:       /61 (BP Location: Left arm)   Pulse 87   Temp 100  F (37.8  C) (Oral)   Resp 16   SpO2 100%   9950-3186    Neuro: A&Ox4.   Cardiac: SR. VSS.   Respiratory: Sating 100% on RA.  GI/: No bm.Adequate urine output(self   Cath for 850 ml at 1300).  Diet/appetite: Tolerating just fluid,decline solid intake.  Activity: Independent up to chair and in halls.  Pain: At acceptable level on current regimen.   Skin: No new deficits noted.  LDA's:PIV  New this shift: Urology irrigated and inserted tyler.    Plan: Continue with POC. Notify primary team with changes.

## 2022-11-08 NOTE — H&P
[facility]    History and Physical - Hospitalist Service, GOLD TEAM        Date of Admission:  11/7/2022    Assessment & Plan      Olu Trinh is a 37 year old male admitted on 11/7/2022. 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 Presented to the ED with new right flank pain with subjective chills. Found to have right pyelonephritis.    # Sepsis 2/2 right pyelonephritis  # Recurrent urinary calculi in the bladder and left renal collecting system  # History of neurogenic bladder/cloacal exstrophy s/o mitrofanoff  Came in with right flank and penile pain + fever + tachycardia. UA dirty. CT A&P obtained by urology team with concerns for pyelonephritis and recurrent urinary calculi + chronic hydronephrosis. WBC 19, lactic acid 2.2 on admission. Previous urine culture grew E.coli, resistant to ampicillin.   - S/p 2L IVF, continue with  ml/hr  - BCs*2, UC obtained, pending -- continue to follow up  - got empirically treated with ceftriaxone 1 g daily, continue  - urology consult placed    # ANABELL   Baseline creatinine ~0.9. Cr on admission 1.29. Ddx sepsis, postrenal obstruction given chronic hydronephrosis.  - continue IVF as above  - continue self cath q4h with prn(reports that he does that q2-3 hrs at home)  - strict Is/Os    # Intractable epilepsy  No recent seizure. Grossly intact neuro exam.  - Keppra extended release 3000 mg daily + oxcarbazepine  mg daily ordered  - pharm consult for med rec ordered    - will be done in the AM   - might need to adjust antiseizure meds accordingly    Diet:  regular diet   DVT Prophylaxis: Low Risk/Ambulatory with no VTE prophylaxis indicated  Shi Catheter: Not present  Fluids: NS *1L, LR 1L, continue with  ml/hr  Central Lines: None  Cardiac Monitoring:  None  Code Status:  full    Clinically Significant Risk Factors Present on Admission         # Hyponatremia: Lowest Na = 135 mmol/L (Ref range: 136-145) in last 2 days, will monitor as appropriate                    Disposition Plan      Expected Discharge Date: 11/09/2022                To be staffed in the AM.    Jeanette Escamilla MD  Hospitalist Service, [facility]  Securely message with the Vocera Web Console (learn more here)  Text page via [facility] Paging/Directory   Please see signed in provider for up to date coverage information  _________________________________________________________________    Chief Complaint   Right flank & penile pain    History is obtained from the patient.    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     He has been feeling off since yesterday 11/7/22. Started experiencing right hip pain, penile pain, difficulty cathing himself, and subjective fever today. Reports intermittently will notice some blood when he urinates with his penis. Went to urology clinic and was worked up for possible UTI. CT A&P obtained with concerns for pyelonephritis so he was told to come into the hospital. Denies respiratory symptoms, abdominal pain, N/V, diarrhea.    Lives with his room mates. Denies smoking. Drinks 3-4 times/week, 2-3 drinks/day. Denies drug use.     Review of Systems    The 10 point Review of Systems is negative other than noted in the HPI or here.     Past Medical History    I have reviewed this patient's medical history and updated it with pertinent information if needed.   Past Medical History:   Diagnosis Date     Attention deficit disorder with hyperactivity(314.01)      Bladder stone 06/02/2015      Exstrophy of bladder sequence 06/29/2015     Learning disability      Neurogenic bladder, NOS 10/30/2012     Obesity (BMI 35.0-39.9 without comorbidity)      Other hydronephrosis 06/02/2015     Unspecified epilepsy with intractable epilepsy 10/22/2013     Past Surgical History   I have reviewed this patient's surgical history and updated it with pertinent information if needed.  Past Surgical History:   Procedure Laterality Date     BLADDER AUGMENTATION       bladder neck reconstruction      Mikey Glez     CYSTOSCOPY N/A 07/10/2018    Procedure: CYSTOSCOPY;  CYSTOSCOPY  OF NEOBLADDER  ;  Surgeon: Mario Ibrahim MD;  Location: [facility] OR     CYSTOSCOPY VIA MITROFANOFF N/A 1/29/2021    Procedure: CYSTOSCOPY, VIA MITROFANOFF;  Surgeon: Joel Blunt MD;  Location: [facility] OR     LASER HOLMIUM LITHOTRIPSY BLADDER N/A 07/24/2015    Procedure: LASER HOLMIUM LITHOTRIPSY BLADDER;  Surgeon: Joel Blunt MD;  Location: [facility] OR     LASER HOLMIUM LITHOTRIPSY BLADDER N/A 07/10/2018    Procedure: LASER HOLMIUM LITHOTRIPSY BLADDER;;  Surgeon: Mario Ibrahim MD;  Location:  OR     LEFT TEMPORAL CRANIOTOMY FOR ANTERIOR TEMPORAL LOBECTOMY  10/01/2018     MITROFANOFF PROCEDURE (APPENDIX CONDUIT)  01/01/2002     PERCUTANEOUS CYSTOLITHOTOMY N/A 9/9/2022    Procedure: CYSTOLITHOTOMY, WITH LASER STANDBY;  Surgeon: Manjit Nicolas MD;  Location: [facility] OR     [facility] REMOVAL OF KIDNEY STONE       Social History   I have reviewed this patient's social history and updated it with pertinent information if needed. Olu Knowles Gayathri  reports that he has never smoked. He has never used smokeless tobacco. He reports current alcohol use. He reports that he does not use drugs.    Family History   I have reviewed this patient's family history and updated it with pertinent information if needed.  Family History   Problem Relation Age of Onset     Seizure Disorder Mother      Breast Cancer Mother      Cancer Father         Prior to Admission Medications   Prior to Admission Medications   Prescriptions Last Dose Informant Patient Reported? Taking?   Cranberry 500 MG CAPS   Yes No   Sig: Take 500 mg by mouth   LORazepam (ATIVAN) 1 MG tablet   Yes No   Sig: TAKE 1 TABLET BY MOUTH AS NEEDED FOR SEIZURE   OXTELLAR  MG 24 hr tablet   Yes No   ciprofloxacin (CIPRO) 500 MG tablet   No No   Sig: Take 1 tablet (500 mg) by mouth 2 times daily for 7 days   ibuprofen (ADVIL/MOTRIN) 200 MG capsule   Yes No   Sig: Take 200 mg by mouth every 4 hours as needed for fever   levETIRAcetam (KEPPRA XR) 500 MG 24 hr tablet   Yes No   Sig: Take 500 mg by mouth daily Takes 4 tablet every am      Facility-Administered Medications: None     Allergies   Allergies   Allergen Reactions     Ondansetron      Other reaction(s): Hallucinations     Sulfa Drugs      unknown       Physical Exam   Vital Signs: Temp: (!) 100.8  F (38.2  C) Temp src: Oral BP: 99/60 Pulse: 78   Resp: 18 SpO2: 98 % O2 Device: None (Room air)    Weight: 0 lbs 0 oz    GA: NAD  HEENT: wnl  Lungs: clear on auscultation both lungs  CVS: RRR, normal S1S2, no murmur  Ext: trace edema  Neuro: A&O*3, grossly intact     Data   Data reviewed today: I reviewed all medications, new labs and imaging results over the last 24 hours.     Recent Labs   Lab 11/07/22 2004 11/07/22  1057   WBC 19.0*  --    HGB 16.2  --    MCV 87  --      --    *  --    POTASSIUM 5.1  --    CHLORIDE 98  --    CO2 22  --    BUN 19.2  --    CR 1.29* 1.1   ANIONGAP 15  --    MIO 9.2  --    *  --    ALBUMIN 4.1  --    PROTTOTAL 7.2  --    BILITOTAL 0.6  --    ALKPHOS 44  --    ALT 24  --    AST 28  --      Recent Results (from the past 24 hour(s))   CT Abdomen wo & w & Pelvis w Contrast    Narrative    EXAMINATION: CT ABDOMEN WO & W & PELVIS W CONTRAST,  11/7/2022 11:26 AM    TECHNIQUE:  TECHNIQUE:  Helical CT images from the lung bases through  the symphysis pubis were obtained  without and with  contrast using CT  urogram protocol. Contrast dose: Isovue 370  120 mls    COMPARISON: 6/3/2021 renal ultrasound, 12/2/2020 abdomen/pelvis CT    HISTORY: History of stones, pelvic/penile/hip pain, difficulty  catheterizing; Urinary tract infection without hematuria, site  unspecified; Pelvic pain in male    FINDINGS:  Urinary System    Right kidney and ureter: Chronically dilated ureter and mild  hydronephrosis. Persistent perinephric fluid and stranding, similar to  previous exam. Suboptimal opacification of the renal collecting system  limits evaluation for intraluminal pathology.    Left kidney and ureter: Chronically dilated ureter and mild  hydronephrosis. Similar appearance of urinary stone in the inferior  pole left renal collecting system. Suboptimal opacification of the  renal collecting system limits evaluation for intraluminal pathology.    Bladder: Postsurgical appearance of prior bladder augmentation for  exstrophy repair with urinary stoma in the right lower quadrant  abdominal wall. Similar appearance of calculus measuring up to 11 mm,  located between the ureterovesicular junctions. Limited evaluation for  intraluminal pathology due to incomplete opacification of the bladder.    Abdomen/Pelvis    Liver: Normal morphology. No discernible mass.    Biliary: No radiodense gallstones. No intrahepatic or extrahepatic  biliary dilation.     Pancreas: No pancreatic mass or inflammatory changes.     Spleen: Splenomegaly measuring up to 14.3 cm. Small splenule adjacent  to the spleen.     Adrenals: No adrenal nodule.     Vascular: Nonaneurysmal abdominal aorta.       Bowel: Colon diverticulosis without evidence of diverticulitis. No  dilated bowel.     Peritoneum/Retroperitoneum: No significant free fluid or free air.      Lymph nodes: Stable appearance of prominent bilateral retroperitoneal  lymph nodes at the level of the renal bryanna.    Lower thorax: The lung bases are clear.     Bones/Soft Tissues:  Diastasis of the symphysis pubis related to  bladder exstrophy. Degenerative changes of the sacroiliac joints  No  acute or aggressive appearing bone lesion.        Impression    IMPRESSION: 1. Right perinephric stranding raises concern for  pyelonephritis versus chronic changes given similar appearance on  comparison exam from 2020. Correlation with clinical parameters  recommended.   2. Stable appearance of bladder augmentation changes and chronic  bilateral hydroureteronephrosis.   3. Unchanged appearance of urinary calculi in the bladder and left  renal collecting system.   4. Unable to evaluate urinary tract for intraluminal masses due to  inadequate contrast opacification.     I have personally reviewed the examination and initial interpretation  and I agree with the findings.    JULIANN HUITRON MD         SYSTEM ID:  [id number]

## 2022-11-08 NOTE — PROGRESS NOTES
[facility]    Progress Note - Medicine Service, MIKE TEAM 5       Date of Admission:  11/7/2022    Assessment & Plan      Olu Trinh is a 37 year old male admitted on 11/7/2022. 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 Presented to the ED with new right flank pain with subjective chills. Found to have right pyelonephritis from CT and gram negative bacteremia.    Today  - Tyler placed with bladder irrigations per urology  - Continue ceftriaxone  - Urology consult placed     # Sepsis secondary to right pyelonephritis  # Recurrent urinary calculi in the bladder and left renal collecting system  # History of neurogenic bladder/cloacal exstrophy status post mitrofanoff  Came in with right flank and penile pain + fever + tachycardia. UA dirty. CT A&P obtained by urology team with concerns for pyelonephritis and recurrent urinary calculi + chronic hydronephrosis. WBC 19, lactic acid 2.2 on admission. Previous urine culture grew E.coli, resistant to ampicillin. Blood cultures this admission growing gram negative bacteremia with a verigene showing E. coli.  - Status post 2L IVF, continue with lactated ringers 125 ml/hr  - Follow up urine and blood cultures  - Urology consulted  - Tyler catheter placed with bladder irrigation TID and prn  - Continue ceftriaxone 2 g q24h, continue     # ANABELL   Baseline creatinine ~0.9. Cr on admission 1.29. Differential diagnosis of acute kidney injury includes pyelonephritis/sepsis, hypovolemia and postrenal obstruction given chronic hydronephrosis.  - continue IVF as above  - tyler catheter  - strict Is/Os     # Intractable epilepsy  No recent seizure. Grossly intact neuro exam on admission. Patient reports increased prodrome symptoms this weekend.  - Keppra extended release  3000 mg daily + oxcarbazepine  mg daily ordered  - 1 mg lorazepam po at bedtime for the next three days per outpatient anti-epileptic clinic  - Seizure precautions     Diet: Combination Diet Regular Diet Adult Regular diet  DVT Prophylaxis: Low Risk/Ambulatory with no VTE prophylaxis indicated  Shi Catheter: Shi placed by urology  Fluids: Lactated ringers 125 mL/hr  Central Lines: None  Cardiac Monitoring: ACTIVE order. Indication: pyelonephritis  Code Status: Full Code Full    Disposition Plan      Expected Discharge Date: 11/10/2022                Patient seen and case discussed with Dr. Gunter who agrees with the above assessment and plan.        Bhavik Kaur, DO  PGY-2, Internal Medicine  [facility]   Securely message with the Vocera Web Console (learn more here)  Text page via [facility] Paging/Directory   Please see signed in provider for up to date coverage information      Clinically Significant Risk Factors Present on Admission         # Hyponatremia: Lowest Na = 132 mmol/L (Ref range: 136-145) in last 2 days, will monitor as appropriate  # Hypocalcemia: Lowest Ca = 8.3 mg/dL (Ref range: 8.5 - 10.1 mg/dL) in last 2 days, will monitor and replace as appropriate                     ______________________________________________________________________    Interval History   Nursing notes reviewed. No acute events overnight. Patient reports his pain is better. He has been having nausea. He states he has prodrome symptoms prior to seizures and has had increased amounts of this over the weekend. Patient has no further concerns or questions at this time.      Data reviewed today: I reviewed all medications, new labs and imaging results over the last 24 hours.    Physical Exam   Vital Signs: Temp: 99  F (37.2  C) Temp src: Oral BP: 122/78 Pulse: 110   Resp: 25 SpO2: 100 % O2 Device: None (Room air)    Weight: 0 lbs 0 oz    General: Alert and oriented without distress  HEENT:  Normocephalic/atraumatic  Respiratory: CTAB without increased respiratory effort or accessory muscle use  Cardiovascular: RRR without murmurs, rubs or gallop. S1, S2 intact.  Abdomen: Bowel sounds present. Soft, non-distended without tenderness to palpation or rebound. R CVA discomfort.  Skin: No overt lesions, bruises, rashes or bleeding on exposed skin surfaces.      Data   Recent Labs   Lab 11/08/22  0451 11/07/22  2004 11/07/22  1057   WBC 17.1* 19.0*  --    HGB 14.8 16.2  --    MCV 88 87  --    * 186  --    * 135*  --    POTASSIUM 4.6 5.1  --    CHLORIDE 100 98  --    CO2 17* 22  --    BUN 25.6* 19.2  --    CR 1.47* 1.29* 1.1   ANIONGAP 15 15  --    MIO 8.3* 9.2  --    * 107*  --    ALBUMIN  --  4.1  --    PROTTOTAL  --  7.2  --    BILITOTAL  --  0.6  --    ALKPHOS  --  44  --    ALT  --  24  --    AST  --  28  --      No results found for this or any previous visit (from the past 24 hour(s)).  Medications     lactated ringers 125 mL/hr at 11/08/22 1444       cefTRIAXone  2 g Intravenous Q24H     levETIRAcetam  3,000 mg Oral Daily     LORazepam  1 mg Oral At Bedtime     [START ON 11/9/2022] OXcarbazepine ER 24hour  900 mg Oral Daily     sodium chloride (PF)  3 mL Intracatheter Q8H

## 2022-11-08 NOTE — ED PROVIDER NOTES
[facility] EMERGENCY DEPARTMENT ([facility])  11/07/22    History   No chief complaint on file.    HPI  Olu Trinh is a 37 year old male with PMH of ADHD, intractable epilepsy without status epilepticus, neurogenic bladder secondary to bladder/cloacal exstrophy and previous bladder stone removals (2015, 2018, 2021 & 2022) who presents to the ED for evaluation of right flank pain starting earlier today.. Patient was seen earlier today at [facility] for evaluation of UTI-like symptoms, R hip pain and difficulty emptyping bladder w/catheter. Patient was tachycardic upon examination at clinic, had CT positive for evidence of right-sided pyelonephritis with chronic bilateral hydroureteronephrosis, and was referred to the ED.      Epic Records Reviewed.     CT ABDOMEN WO & W & PELVIS W CONTRAST, [facility] (11/7/2022)  IMPRESSION:   1. Right perinephric stranding raises concern for  pyelonephritis versus chronic changes given similar appearance on  comparison exam from 2020. Correlation with clinical parameters  recommended.   2. Stable appearance of bladder augmentation changes and chronic  bilateral hydroureteronephrosis.   3. Unchanged appearance of urinary calculi in the bladder and left  renal collecting system.   4. Unable to evaluate urinary tract for intraluminal masses due to  inadequate contrast opacification.     Past Medical History  Past Medical History:   Diagnosis Date     Attention deficit disorder with hyperactivity(314.01)      Bladder stone 06/02/2015     Exstrophy of bladder sequence 06/29/2015     Learning disability      Neurogenic bladder, NOS 10/30/2012     Obesity (BMI 35.0-39.9 without comorbidity)      Other hydronephrosis 06/02/2015     Unspecified epilepsy with intractable epilepsy 10/22/2013     Past Surgical History:   Procedure Laterality Date     BLADDER AUGMENTATION       bladder neck reconstruction       Mikey Glez     CYSTOSCOPY N/A 07/10/2018    Procedure: CYSTOSCOPY;  CYSTOSCOPY  OF NEOBLADDER  ;  Surgeon: Mario Ibrahim MD;  Location: [facility] OR     CYSTOSCOPY VIA MITROFANOFF N/A 1/29/2021    Procedure: CYSTOSCOPY, VIA MITROFANOFF;  Surgeon: Joel Blunt MD;  Location: [facility] OR     LASER HOLMIUM LITHOTRIPSY BLADDER N/A 07/24/2015    Procedure: LASER HOLMIUM LITHOTRIPSY BLADDER;  Surgeon: Joel Blunt MD;  Location: [facility] OR     LASER HOLMIUM LITHOTRIPSY BLADDER N/A 07/10/2018    Procedure: LASER HOLMIUM LITHOTRIPSY BLADDER;;  Surgeon: Mario Ibrahim MD;  Location:  OR     LEFT TEMPORAL CRANIOTOMY FOR ANTERIOR TEMPORAL LOBECTOMY  10/01/2018     MITROFANOFF PROCEDURE (APPENDIX CONDUIT)  01/01/2002     PERCUTANEOUS CYSTOLITHOTOMY N/A 9/9/2022    Procedure: CYSTOLITHOTOMY, WITH LASER STANDBY;  Surgeon: Manjit Nicolas MD;  [facility]     [facility] REMOVAL OF KIDNEY STONE       ciprofloxacin (CIPRO) 500 MG tablet  Cranberry 500 MG CAPS  ibuprofen (ADVIL/MOTRIN) 200 MG capsule  levETIRAcetam (KEPPRA XR) 500 MG 24 hr tablet  LORazepam (ATIVAN) 1 MG tablet  OXTELLAR  MG 24 hr tablet      Allergies   Allergen Reactions     Ondansetron      Other reaction(s): Hallucinations     Sulfa Drugs      unknown     Family History  Family History   Problem Relation Age of Onset     Seizure Disorder Mother      Breast Cancer Mother      Cancer Father      Social History   Social History     Tobacco Use     Smoking status: Never     Smokeless tobacco: Never   Substance Use Topics     Alcohol use: Yes     Comment: Alcoholic beverage once or twice per week.     Drug use: No      Past medical history, past surgical history, medications, allergies, family history, and social history were reviewed with the patient. No additional pertinent items.       Review of Systems   Constitutional: Negative for fever.   Respiratory: Negative for shortness of breath.    Cardiovascular: Negative for chest  pain.   Gastrointestinal: Negative for abdominal pain.   All other systems reviewed and are negative.    A complete review of systems was performed with pertinent positives and negatives noted in the HPI, and all other systems negative.    Physical Exam      Physical Exam  Vitals and nursing note reviewed.   HENT:      Head: Atraumatic.   Eyes:      Pupils: Pupils are equal, round, and reactive to light.   Cardiovascular:      Rate and Rhythm: Normal rate and regular rhythm.      Heart sounds: Normal heart sounds.   Pulmonary:      Effort: No respiratory distress.      Breath sounds: Normal breath sounds.   Chest:      Chest wall: No tenderness.   Abdominal:      Tenderness: There is no abdominal tenderness.   Musculoskeletal:      Cervical back: No tenderness.      Thoracic back: No tenderness.      Lumbar back: No tenderness.   Neurological:      General: No focal deficit present.      Mental Status: He is oriented to person, place, and time.           ED Course     ED Course as of 11/08/22 0235   Mon Nov 07, 2022 2015 Temp(!): 100.8  F (38.2  C)   2015 Pulse: 117   2117 WBC(!): 19.0   2117 Lactic Acid(!): 2.2   2155 UA with Microscopic reflex to Culture(!)   2156 Creatinine(!): 1.29     Procedures                 Results for orders placed or performed in visit on 11/07/22   CT Abdomen wo & w & Pelvis w Contrast     Status: None    Narrative    EXAMINATION: CT ABDOMEN WO & W & PELVIS W CONTRAST,  11/7/2022 11:26 AM    TECHNIQUE:  TECHNIQUE:  Helical CT images from the lung bases through  the symphysis pubis were obtained  without and with contrast using CT  urogram protocol. Contrast dose: Isovue 370  120 mls    COMPARISON: 6/3/2021 renal ultrasound, 12/2/2020 abdomen/pelvis CT    HISTORY: History of stones, pelvic/penile/hip pain, difficulty  catheterizing; Urinary tract infection without hematuria, site  unspecified; Pelvic pain in male    FINDINGS:  Urinary System    Right kidney and ureter: Chronically  dilated ureter and mild  hydronephrosis. Persistent perinephric fluid and stranding, similar to  previous exam. Suboptimal opacification of the renal collecting system  limits evaluation for intraluminal pathology.    Left kidney and ureter: Chronically dilated ureter and mild  hydronephrosis. Similar appearance of urinary stone in the inferior  pole left renal collecting system. Suboptimal opacification of the  renal collecting system limits evaluation for intraluminal pathology.    Bladder: Postsurgical appearance of prior bladder augmentation for  exstrophy repair with urinary stoma in the right lower quadrant  abdominal wall. Similar appearance of calculus measuring up to 11 mm,  located between the ureterovesicular junctions. Limited evaluation for  intraluminal pathology due to incomplete opacification of the bladder.    Abdomen/Pelvis    Liver: Normal morphology. No discernible mass.    Biliary: No radiodense gallstones. No intrahepatic or extrahepatic  biliary dilation.     Pancreas: No pancreatic mass or inflammatory changes.     Spleen: Splenomegaly measuring up to 14.3 cm. Small splenule adjacent  to the spleen.     Adrenals: No adrenal nodule.     Vascular: Nonaneurysmal abdominal aorta.       Bowel: Colon diverticulosis without evidence of diverticulitis. No  dilated bowel.     Peritoneum/Retroperitoneum: No significant free fluid or free air.      Lymph nodes: Stable appearance of prominent bilateral retroperitoneal  lymph nodes at the level of the renal bryanna.    Lower thorax: The lung bases are clear.     Bones/Soft Tissues: Diastasis of the symphysis pubis related to  bladder exstrophy. Degenerative changes of the sacroiliac joints  No  acute or aggressive appearing bone lesion.        Impression    IMPRESSION: 1. Right perinephric stranding raises concern for  pyelonephritis versus chronic changes given similar appearance on  comparison exam from 2020. Correlation with clinical  parameters  recommended.   2. Stable appearance of bladder augmentation changes and chronic  bilateral hydroureteronephrosis.   3. Unchanged appearance of urinary calculi in the bladder and left  renal collecting system.   4. Unable to evaluate urinary tract for intraluminal masses due to  inadequate contrast opacification.     I have personally reviewed the examination and initial interpretation  and I agree with the findings.    JULIANN HUITRON MD         SYSTEM ID:  [id number]   Creatinine POCT     Status: Normal   Result Value Ref Range    Creatinine POCT 1.1 0.7 - 1.3 mg/dL    GFR, ESTIMATED POCT >60 >60 mL/min/1.73m2   Results for orders placed or performed in visit on 11/07/22   Routine UA with microscopic - No culture     Status: Abnormal   Result Value Ref Range    Color Urine Yellow Colorless, Straw, Light Yellow, Yellow    Appearance Urine Slightly Cloudy (A) Clear    Glucose Urine Negative Negative mg/dL    Bilirubin Urine Negative Negative    Ketones Urine Negative Negative mg/dL    Specific Gravity Urine 1.020 1.003 - 1.035    Blood Urine Small (A) Negative    pH Urine 7.0 5.0 - 7.0    Protein Albumin Urine 100 (A) Negative mg/dL    Urobilinogen Urine Normal Normal, 2.0 mg/dL    Nitrite Urine Negative Negative    Leukocyte Esterase Urine Moderate (A) Negative    Bacteria Urine Moderate (A) None Seen /HPF    WBC Clumps Urine Present (A) None Seen /HPF    Mucus Urine Present (A) None Seen /LPF    RBC Urine 10 (H) <=2 /HPF    WBC Urine 85 (H) <=5 /HPF     Medications - No data to display     Assessments & Plan (with Medical Decision Making)     37 year old male with PMH of ADHD, intractable epilepsy without status epilepticus, 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  Vital signs in triage notable for fever to 100.8 and tachycardia to  117 mild tachypnea 24, blood pressure 106/73 and normal pulse ox 100% on room air.  IV established, labs drawn sent reviewed document epic remarkable for leukocytosis at 19.0 but otherwise normal CBC, lactic acid 2.2, mild ANABELL with a creatinine of 1.29 and UA with large whites, bacteria and leuk esterase and nitrates.  Patient was given ceftriaxone along with a liter normal saline IV fluid bolus.  Case discussed with medicine hospital service with agreement to admit to inpatient status for pyelonephritis.      I have reviewed the nursing notes. I have reviewed the findings, diagnosis, plan and need for follow up with the patient.    New Prescriptions    No medications on file       Final diagnoses:   Pyelonephritis       --  Gerry Valverde MD  [facility] EMERGENCY DEPARTMENT  11/7/2022     Gerry Valverde MD  11/08/22 0239

## 2022-11-08 NOTE — CONSULTS
Urology Consult History and Physical    Name: Olu Trinh    MRN: [id number]   YOB: 1985       We were asked to see Olu Trinh at the request of Dr. 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  He has past surgical Hx of Young Mayra Leadbetter bladder neck reconstruction as a child, bladder augmentation (unknown bowel type) and appendiceal Mitrofanoff in the distant past.  He is dependent on straight catheterization.  He follows with Dr. Blunt. He has Hx of recurrent bladder stones and underwent cystolithalopaxy on 7/24/15, 7/10/18, 1/29/21 and most recently on 9/9/22 (using cystoscopy through channel).  He does get UTIs and recent positive cultures have shown:  9/1/22 - >100K E Coli (R:amp)  6/7/22 ->100K E Coli (R:amp)   1/7/22 - >100K K oxytoca (2 strains) and E faecium --> all resistant to ampicillin with Enterococcus also resistant to PCN  9/21/21- >100K E coli (R amp, gent)    Prior to his last surgery, he was started on a 5d course of nitrofurantoin on 9/6/22.      Yesterday he presented to Urology clinic with UTI symptoms (difficulty cathing, right hip pain, pain at the tip of his penis, subjective chills).  Vitals showed tachycardia to 130's and he appeared to have subjective dyspnea.  UA showed RBC 10, WBC 85, neg nitrites --> UCx pending.  A CT urogram was obtained that showed R perinephric stranding, stable chronic augment with BL hydroureteronephrosis, unchanged urinary calculi in bladder and left renal collecting system.  There were no obstructing stones.  Ultimately he was referred to ED  for concern for sepsis and he has been admitted to Internal Medicine service.     Notably, he has a leukocytosis (19 at admission --> 17.1 today).  Repeat UA showed positive nitrites.  His serum creatinine was 1.49mg/dL at admission and today is 1.29mg/dL (up from baseline 0.92mg/d), in the setting of sepsis and recent IV contrast.  His lactate was 2.2 but now is 1.8.  He has been started on ceftriaxone.  In the hospital HR initially was 117bpm, but recently is 75bpm.  Tmax is 100.8.  RR has decreased from 24 --> 18 and he is breathing room air.      He sts his pain has completely resolved in his penis and right hip.  He never had bladder or flank pain.  He has intermittent difficulties catheterizing at times, but this is intermittent and there have been no recent serious concerns.  CIC every 3-4 hours with sensation of bladder fullness and feels he empties.  Also irrigates a few times a week to remove mucus.  No hematuria.  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     Learning disability      Neurogenic bladder, NOS 10/30/2012     Obesity (BMI 35.0-39.9 without comorbidity)      Other hydronephrosis 06/02/2015     Unspecified epilepsy with intractable epilepsy 10/22/2013            Past Surgical History:     Past Surgical History:   Procedure Laterality Date     BLADDER AUGMENTATION       bladder neck reconstruction      Mikey Mayra De La Cruzdelilah     CYSTOSCOPY N/A 07/10/2018    Procedure: CYSTOSCOPY;  CYSTOSCOPY  OF NEOBLADDER  ;  Surgeon: Mario Ibrahim MD;  Location:  OR     CYSTOSCOPY VIA MITROFANOFF N/A 1/29/2021    Procedure: CYSTOSCOPY, VIA MITROFANOFF;  Surgeon: Joel Blunt MD;  Location: [facility] OR     LASER HOLMIUM LITHOTRIPSY BLADDER N/A 07/24/2015    Procedure: LASER HOLMIUM LITHOTRIPSY BLADDER;   Surgeon: Joel Blunt MD;  Location: [facility] OR     LASER HOLMIUM LITHOTRIPSY BLADDER N/A 07/10/2018    Procedure: LASER HOLMIUM LITHOTRIPSY BLADDER;;  Surgeon: Mario Ibrahim MD;  Location: [facility] OR     LEFT TEMPORAL CRANIOTOMY FOR ANTERIOR TEMPORAL LOBECTOMY  10/01/2018     MITROFANOFF PROCEDURE (APPENDIX CONDUIT)  01/01/2002     PERCUTANEOUS CYSTOLITHOTOMY N/A 9/9/2022    Procedure: CYSTOLITHOTOMY, WITH LASER STANDBY;  Surgeon: Manjit Nicolas MD;  Location: [facility] OR     [facility] REMOVAL OF KIDNEY STONE              Social History:     Social History     Tobacco Use     Smoking status: Never     Smokeless tobacco: Never   Substance Use Topics     Alcohol use: Yes     Comment: Alcoholic beverage once or twice per week.            Family History:     Family History   Problem Relation Age of Onset     Seizure Disorder Mother      Breast Cancer Mother      Cancer Father             Allergies:     Allergies   Allergen Reactions     Ondansetron      Other reaction(s): Hallucinations     Sulfa Drugs      unknown            Medications:     Current Facility-Administered Medications   Medication     cefTRIAXone (ROCEPHIN) 1 g vial to attach to  mL bag for ADULTS or NS 50 mL bag for PEDS     levETIRAcetam (KEPPRA XR) 24 hr tablet 3,000 mg     OXcarbazepine ER 24hour (OXTELLAR XR) 24 hr tablet 900 mg     sodium chloride 0.9% infusion     Current Outpatient Medications   Medication Sig     Cranberry 500 MG CAPS Take 500 mg by mouth     ibuprofen (ADVIL/MOTRIN) 200 MG capsule Take 200 mg by mouth every 4 hours as needed for fever     levETIRAcetam (KEPPRA XR) 500 MG 24 hr tablet Take 500 mg by mouth daily Takes 4 tablet every am     LORazepam (ATIVAN) 1 MG tablet TAKE 1 TABLET BY MOUTH AS NEEDED FOR SEIZURE     ciprofloxacin (CIPRO) 500 MG tablet Take 1 tablet (500 mg) by mouth 2 times daily for 7 days (Patient not taking: Reported on 11/8/2022)     OXTELLAR  MG 24 hr tablet Take 900 mg by mouth daily              Review of Systems:    ROS: See HPI for pertinent details.  Remainder of 10-point ROS negative.         Physical Exam:   VS:  T: 100.8    HR: 75    BP: 101/65    RR: 18   GEN:  AOx3.  NAD.  Pleasant.  HEENT:  Sclerae anicteric.  Conjunctivae pink.  Moist mucous membranes  NECK:  Supple.  No lymphadenopathy.  LUNGS: Non-labored breathing.  BACK:  No costoverterbral tenderness.  ABD:  Soft.  NT.  ND.  No rebound or guarding.  Midline abdominal scar well healed. No masses.    :  Phallus circumcised.  Meatus patent without lesions.  Testicles descended bilaterally, no nodules or tenderness.    DIVYA:  Deferred  EXT:  Warm, well perfused.   SKIN:  Warm.  Dry.  No rashes.          Data:   All laboratory data reviewed:    No results found for: PSA  Recent Labs   Lab 11/08/22 0451 11/07/22 2004   WBC 17.1* 19.0*   HGB 14.8 16.2   * 186     Recent Labs   Lab 11/08/22  0451 11/07/22 2004 11/07/22  1057   * 135*  --    POTASSIUM 4.6 5.1  --    CHLORIDE 100 98  --    CO2 17* 22  --    BUN 25.6* 19.2  --    CR 1.47* 1.29* 1.1   * 107*  --    MIO 8.3* 9.2  --      Recent Labs   Lab 11/07/22 2004   COLOR Light Yellow   APPEARANCE Slightly Cloudy*   URINEGLC Negative   URINEBILI Negative   URINEKETONE Negative   SG 1.022   URINEPH 6.5   PROTEIN 70*   NITRITE Positive*   LEUKEST Large*   RBCU 2   WBCU >182*     Results for orders placed or performed in visit on 09/01/22   Urine Culture Aerobic Bacterial    Specimen: Urine, Midstream   Result Value Ref Range    Culture >100,000 CFU/mL Escherichia coli (A)        Susceptibility    Escherichia coli - LILLY     Ampicillin >=32 Resistant ug/mL     Ampicillin/ Sulbactam 16 Intermediate ug/mL     Piperacillin/Tazobactam <=4 Susceptible ug/mL     Cefazolin* <=4 Susceptible ug/mL      * Cefazolin LILLY breakpoints are for the treatment of uncomplicated urinary tract infections. For the treatment of systemic infections, please contact the laboratory for additional  testing.     Cefoxitin <=4 Susceptible ug/mL     Ceftazidime <=1 Susceptible ug/mL     Ceftriaxone <=1 Susceptible ug/mL     Cefepime <=1 Susceptible ug/mL     Gentamicin <=1 Susceptible ug/mL     Tobramycin <=1 Susceptible ug/mL     Ciprofloxacin <=0.25 Susceptible ug/mL     Levofloxacin <=0.12 Susceptible ug/mL     Nitrofurantoin <=16 Susceptible ug/mL     Trimethoprim/Sulfamethoxazole <=1/19 Susceptible ug/mL   Results for orders placed or performed in visit on 06/07/22   Urine Culture Aerobic Bacterial    Specimen: Urine, Catheter   Result Value Ref Range    Culture 50,000-100,000 CFU/mL Escherichia coli (A)        Susceptibility    Escherichia coli - LILLY     Ampicillin >=32.0 Resistant ug/mL     Ampicillin/ Sulbactam 16.0 Intermediate ug/mL     Piperacillin/Tazobactam <=4.0 Susceptible ug/mL     Cefazolin* <=4.0 Susceptible ug/mL      * Cefazolin LILLY breakpoints are for the treatment of uncomplicated urinary tract infections. For the treatment of systemic infections, please contact the laboratory for additional testing.     Cefoxitin <=4.0 Susceptible ug/mL     Ceftazidime <=1.0 Susceptible ug/mL     Ceftriaxone <=1.0 Susceptible ug/mL     Cefepime <=1.0 Susceptible ug/mL     Gentamicin <=1.0 Susceptible ug/mL     Tobramycin <=1.0 Susceptible ug/mL     Ciprofloxacin <=0.25 Susceptible ug/mL     Levofloxacin <=0.12 Susceptible ug/mL     Nitrofurantoin <=16.0 Susceptible ug/mL     Trimethoprim/Sulfamethoxazole <=1/19 Susceptible ug/mL       All pertinent imaging reviewed:  EXAMINATION: CT ABDOMEN WO & W & PELVIS W CONTRAST,  11/7/2022 11:26 AM     TECHNIQUE:  TECHNIQUE:  Helical CT images from the lung bases through  the symphysis pubis were obtained  without and with contrast using CT  urogram protocol. Contrast dose: Isovue 370  120 mls     COMPARISON: 6/3/2021 renal ultrasound, 12/2/2020 abdomen/pelvis CT     HISTORY: History of stones, pelvic/penile/hip pain, difficulty  catheterizing; Urinary tract infection  without hematuria, site  unspecified; Pelvic pain in male     FINDINGS:  Urinary System     Right kidney and ureter: Chronically dilated ureter and mild  hydronephrosis. Persistent perinephric fluid and stranding, similar to  previous exam. Suboptimal opacification of the renal collecting system  limits evaluation for intraluminal pathology.     Left kidney and ureter: Chronically dilated ureter and mild  hydronephrosis. Similar appearance of urinary stone in the inferior  pole left renal collecting system. Suboptimal opacification of the  renal collecting system limits evaluation for intraluminal pathology.     Bladder: Postsurgical appearance of prior bladder augmentation for  exstrophy repair with urinary stoma in the right lower quadrant  abdominal wall. Similar appearance of calculus measuring up to 11 mm,  located between the ureterovesicular junctions. Limited evaluation for  intraluminal pathology due to incomplete opacification of the bladder.     Abdomen/Pelvis     Liver: Normal morphology. No discernible mass.     Biliary: No radiodense gallstones. No intrahepatic or extrahepatic  biliary dilation.      Pancreas: No pancreatic mass or inflammatory changes.      Spleen: Splenomegaly measuring up to 14.3 cm. Small splenule adjacent  to the spleen.      Adrenals: No adrenal nodule.      Vascular: Nonaneurysmal abdominal aorta.        Bowel: Colon diverticulosis without evidence of diverticulitis. No  dilated bowel.      Peritoneum/Retroperitoneum: No significant free fluid or free air.       Lymph nodes: Stable appearance of prominent bilateral retroperitoneal  lymph nodes at the level of the renal bryanna.     Lower thorax: The lung bases are clear.      Bones/Soft Tissues: Diastasis of the symphysis pubis related to  bladder exstrophy. Degenerative changes of the sacroiliac joints  No  acute or aggressive appearing bone lesion.                                                                        IMPRESSION:  1. Right perinephric stranding raises concern for  pyelonephritis versus chronic changes given similar appearance on  comparison exam from 2020. Correlation with clinical parameters  recommended.   2. Stable appearance of bladder augmentation changes and chronic  bilateral hydroureteronephrosis.   3. Unchanged appearance of urinary calculi in the bladder and left  renal collecting system.   4. Unable to evaluate urinary tract for intraluminal masses due to  inadequate contrast opacification.          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  He has past surgical Hx of Young Mayra Leadbetter bladder neck reconstruction as a child, bladder augmentation (unknown bowel type) and appendiceal Mitrofanoff in the distant past.  He is dependent on straight catheterization.  He follows with Dr. Blunt. He has Hx of recurrent bladder stones and underwent cystolithalopaxy on 7/24/15, 7/10/18, 1/29/21 and most recently on 9/9/22 (using cystoscopy through channel).  He gets UTIs.      He was admitted last night with:  1) ANABELL   2) sepsis secondary to pyelonephritis and bacteremia (gram negative bacilli).  CT scan from yesterday showed stable BL hydronephrosis and persistent urinary calcifications, none obstructing.       - Agree with abx treatment for bacteremia due to pyelonephritis,  cultures pending.    - Given bacteremia and chronic hydronephrosis, will place Shi to decompress the bladder and collecting system.  While Shi is in place, RN should irrigate bladder TID and PRN (to remove mucus from the bladder and assure the Shi catheter is draining properly)  - OK to remove the Shi once afebrile x 24 hours and blood cultures are negative.  Once removed, patient will need to resume straight catheterization schedule every 3-4 hours.   - Please notify urology if patient  isn't improving as expected or if acute kidney injury is persisting.    - Follow up with Dr. Nicolas outpatient on 1/6/22 to discuss symptoms and ongoing cares.      Urology will follow peripherally   Discussed with Dr. Pack    Thank you for the opportunity to participate in the care of Olu Knowles Yossidelilah.     Caroline Macias PA-C  Urology Physician Assistant  Personal Pager: [phone]    Please call Job Code:   [phone] to reach the Urology resident or PA on call - Weekdays  [phone] to reach the Urology resident or PA on call - Weeknights and weekends

## 2022-11-09 LAB
ANION GAP SERPL CALCULATED.3IONS-SCNC: 13 MMOL/L (ref 7–15)
BACTERIA UR CULT: ABNORMAL
BUN SERPL-MCNC: 16.3 MG/DL (ref 6–20)
CALCIUM SERPL-MCNC: 9.1 MG/DL (ref 8.6–10)
CHLORIDE SERPL-SCNC: 104 MMOL/L (ref 98–107)
CREAT SERPL-MCNC: 1.01 MG/DL (ref 0.67–1.17)
DEPRECATED HCO3 PLAS-SCNC: 18 MMOL/L (ref 22–29)
ERYTHROCYTE [DISTWIDTH] IN BLOOD BY AUTOMATED COUNT: 12.8 % (ref 10–15)
GFR SERPL CREATININE-BSD FRML MDRD: >90 ML/MIN/1.73M2
GLUCOSE SERPL-MCNC: 104 MG/DL (ref 70–99)
HCT VFR BLD AUTO: 40.7 % (ref 40–53)
HGB BLD-MCNC: 13.5 G/DL (ref 13.3–17.7)
MCH RBC QN AUTO: 29.3 PG (ref 26.5–33)
MCHC RBC AUTO-ENTMCNC: 33.2 G/DL (ref 31.5–36.5)
MCV RBC AUTO: 88 FL (ref 78–100)
PLATELET # BLD AUTO: 121 10E3/UL (ref 150–450)
POTASSIUM SERPL-SCNC: 3.9 MMOL/L (ref 3.4–5.3)
RBC # BLD AUTO: 4.61 10E6/UL (ref 4.4–5.9)
SODIUM SERPL-SCNC: 135 MMOL/L (ref 136–145)
WBC # BLD AUTO: 10.6 10E3/UL (ref 4–11)

## 2022-11-09 PROCEDURE — 85027 COMPLETE CBC AUTOMATED: CPT | Performed by: STUDENT IN AN ORGANIZED HEALTH CARE EDUCATION/TRAINING PROGRAM

## 2022-11-09 PROCEDURE — 250N000011 HC RX IP 250 OP 636: Performed by: INTERNAL MEDICINE

## 2022-11-09 PROCEDURE — 250N000013 HC RX MED GY IP 250 OP 250 PS 637: Performed by: STUDENT IN AN ORGANIZED HEALTH CARE EDUCATION/TRAINING PROGRAM

## 2022-11-09 PROCEDURE — 120N000002 HC R&B MED SURG/OB UMMC

## 2022-11-09 PROCEDURE — 80048 BASIC METABOLIC PNL TOTAL CA: CPT | Performed by: STUDENT IN AN ORGANIZED HEALTH CARE EDUCATION/TRAINING PROGRAM

## 2022-11-09 PROCEDURE — 250N000013 HC RX MED GY IP 250 OP 250 PS 637: Performed by: INTERNAL MEDICINE

## 2022-11-09 PROCEDURE — 99232 SBSQ HOSP IP/OBS MODERATE 35: CPT | Mod: GC | Performed by: INTERNAL MEDICINE

## 2022-11-09 PROCEDURE — 36415 COLL VENOUS BLD VENIPUNCTURE: CPT | Performed by: STUDENT IN AN ORGANIZED HEALTH CARE EDUCATION/TRAINING PROGRAM

## 2022-11-09 PROCEDURE — 250N000013 HC RX MED GY IP 250 OP 250 PS 637

## 2022-11-09 RX ADMIN — LORAZEPAM 1 MG: 0.5 TABLET ORAL at 21:24

## 2022-11-09 RX ADMIN — OXCARBAZEPINE 900 MG: 300 TABLET ORAL at 09:06

## 2022-11-09 RX ADMIN — CEFTRIAXONE SODIUM 2 G: 2 INJECTION, POWDER, FOR SOLUTION INTRAMUSCULAR; INTRAVENOUS at 20:26

## 2022-11-09 RX ADMIN — LEVETIRACETAM 3000 MG: 750 TABLET, FILM COATED, EXTENDED RELEASE ORAL at 09:06

## 2022-11-09 ASSESSMENT — ACTIVITIES OF DAILY LIVING (ADL)
CONCENTRATING,_REMEMBERING_OR_MAKING_DECISIONS_DIFFICULTY: NO
ADLS_ACUITY_SCORE: 35
FALL_HISTORY_WITHIN_LAST_SIX_MONTHS: NO
WALKING_OR_CLIMBING_STAIRS_DIFFICULTY: NO
ADLS_ACUITY_SCORE: 35
ADLS_ACUITY_SCORE: 35
WEAR_GLASSES_OR_BLIND: NO
DOING_ERRANDS_INDEPENDENTLY_DIFFICULTY: NO
ADLS_ACUITY_SCORE: 35
ADLS_ACUITY_SCORE: 18
ADLS_ACUITY_SCORE: 35
DRESSING/BATHING_DIFFICULTY: NO
TOILETING_ISSUES: NO
ADLS_ACUITY_SCORE: 18
ADLS_ACUITY_SCORE: 35
CHANGE_IN_FUNCTIONAL_STATUS_SINCE_ONSET_OF_CURRENT_ILLNESS/INJURY: NO
ADLS_ACUITY_SCORE: 18
ADLS_ACUITY_SCORE: 35
DIFFICULTY_EATING/SWALLOWING: NO

## 2022-11-09 NOTE — PLAN OF CARE
Afebrile, VSS on RA.  Alert and oriented x4.  Denies pain and nausea.  Adequate urine output via Mitrofanoff into tyler bag.   Tyler irrigated x1.  Up independently.  Continue plan of care.

## 2022-11-09 NOTE — PROGRESS NOTES
[facility]    Progress Note - Medicine Service, MIKE TEAM 5       Date of Admission:  11/7/2022    Assessment & Plan      Olu Trinh is a 37 year old male admitted on 11/7/2022. 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 Presented to the ED with new right flank pain with subjective chills. Found to have right pyelonephritis from CT and E. coli bacteremia.    Today  - We will discuss case with urology regarding using sterile water (requires prescription for this)  - Discontinued IV fluids  - Discontinue telemetry     # Sepsis secondary to E. coli from right pyelonephritis  # Recurrent urinary calculi in the bladder and left renal collecting system  # History of neurogenic bladder/cloacal exstrophy status post mitrofanoff  Came in with right flank and penile pain + fever + tachycardia. UA dirty. CT A&P obtained by urology team with concerns for pyelonephritis and recurrent urinary calculi + chronic hydronephrosis. WBC 19, lactic acid 2.2 on admission. Previous urine culture grew E.coli, resistant to ampicillin. Blood cultures this admission growing E. coli.  -Discontinued IV fluids  - Follow up urine and blood culture susceptibilities  - Urology consulted  - Tyler catheter placed with bladder irrigation TID and prn  - Continue ceftriaxone 2 g q24h     # ANABELL, improving  Baseline creatinine ~0.9. Cr on admission 1.29. Differential diagnosis of acute kidney injury includes pyelonephritis/sepsis, hypovolemia and postrenal obstruction given chronic hydronephrosis.  -Discontinued IV fluids as above  - tyler catheter  - strict Is/Os     # Intractable epilepsy  No recent seizure. Grossly intact neuro exam on admission. Patient reports increased prodrome symptoms this weekend.  -Levetiracetam extended release  3000 mg daily + oxcarbazepine  mg daily ordered  - 1 mg lorazepam po at bedtime for the three days (starting 11/8) per outpatient anti-epileptic clinic  - Seizure precautions     Diet: Combination Diet Regular Diet Adult  DVT Prophylaxis: Low Risk/Ambulatory with no VTE prophylaxis indicated  Shi Catheter: Shi placed by urology  Fluids: Discontinued IV fluids  Central Lines: None  Cardiac Monitoring: None  Code Status: Full Code Full    Disposition Plan      Expected Discharge Date: 11/10/2022        Discharge Comments: Pending treatment of gram negative bacteremia        Patient seen and case discussed with Dr. Gunter who agrees with the above assessment and plan.        Bhavik Kaur, DO  PGY-2, Internal Medicine  [facility]   Securely message with the Vocera Web Console (learn more here)  Text page via [facility] Paging/Directory   Please see signed in provider for up to date coverage information      Clinically Significant Risk Factors         # Hyponatremia: Lowest Na = 132 mmol/L (Ref range: 136-145) in last 2 days, will monitor as appropriate  # Hypocalcemia: Lowest Ca = 8.3 mg/dL (Ref range: 8.5 - 10.1 mg/dL) in last 2 days, will monitor and replace as appropriate       # Thrombocytopenia: Lowest platelets = 121 (Ref range: 150-450) in last 2 days, will monitor for bleeding               ______________________________________________________________________    Interval History   Nursing notes reviewed. No acute events overnight.  Patient reports right flank pain.  He is otherwise feeling okay and feels like he can discharge soon.  He states that in order to use sterile water with his irrigation he would need a prescription. Patient has no further concerns or questions at this time.      Data reviewed today: I reviewed all medications, new labs and imaging results over the last 24 hours.    Physical Exam   Vital Signs: Temp: 99  F (37.2  C) Temp src: Oral BP: (!) 142/75  Pulse: 85   Resp: 24 SpO2: 98 % O2 Device: None (Room air)    Weight: 0 lbs 0 oz    General: Alert and oriented without distress  HEENT: Normocephalic/atraumatic  Respiratory: CTAB without increased respiratory effort or accessory muscle use  Cardiovascular: RRR without murmurs, rubs or gallop. S1, S2 intact.  Abdomen: Bowel sounds present. Soft, non-distended without tenderness to palpation or rebound. R CVA tenderness.  Skin: No overt lesions, bruises, rashes or bleeding on exposed skin surfaces.      Data   Recent Labs   Lab 11/09/22  0552 11/08/22  0451 11/07/22 2004   WBC 10.6 17.1* 19.0*   HGB 13.5 14.8 16.2   MCV 88 88 87   * 140* 186   * 132* 135*   POTASSIUM 3.9 4.6 5.1   CHLORIDE 104 100 98   CO2 18* 17* 22   BUN 16.3 25.6* 19.2   CR 1.01 1.47* 1.29*   ANIONGAP 13 15 15   MIO 9.1 8.3* 9.2   * 105* 107*   ALBUMIN  --   --  4.1   PROTTOTAL  --   --  7.2   BILITOTAL  --   --  0.6   ALKPHOS  --   --  44   ALT  --   --  24   AST  --   --  28     No results found for this or any previous visit (from the past 24 hour(s)).  Medications     lactated ringers 125 mL/hr at 11/08/22 2207       cefTRIAXone  2 g Intravenous Q24H     levETIRAcetam  3,000 mg Oral Daily     LORazepam  1 mg Oral At Bedtime     OXcarbazepine ER 24hour  900 mg Oral Daily     sodium chloride (PF)  3 mL Intracatheter Q8H

## 2022-11-09 NOTE — ED NOTES
Catheter not flowing per patient. 600 ml of Urine noted in catheter bag.  Flushed with 20 ml of NS.  Urine draining at this time. 1000 ml of urine noted. Patient reports relief.  Dressing applied to catheter sight. No drainage noted.  Patient tolerated well.

## 2022-11-10 VITALS
DIASTOLIC BLOOD PRESSURE: 83 MMHG | BODY MASS INDEX: 37.58 KG/M2 | OXYGEN SATURATION: 93 % | HEART RATE: 77 BPM | WEIGHT: 277.1 LBS | RESPIRATION RATE: 15 BRPM | TEMPERATURE: 98.1 F | SYSTOLIC BLOOD PRESSURE: 134 MMHG

## 2022-11-10 PROBLEM — R78.81 E COLI BACTEREMIA: Status: ACTIVE | Noted: 2022-11-10

## 2022-11-10 PROBLEM — B96.20 E COLI BACTEREMIA: Status: ACTIVE | Noted: 2022-11-10

## 2022-11-10 LAB
ANION GAP SERPL CALCULATED.3IONS-SCNC: 12 MMOL/L (ref 7–15)
BACTERIA BLD CULT: ABNORMAL
BASOPHILS # BLD AUTO: 0 10E3/UL (ref 0–0.2)
BASOPHILS NFR BLD AUTO: 0 %
BUN SERPL-MCNC: 14.8 MG/DL (ref 6–20)
CALCIUM SERPL-MCNC: 9.2 MG/DL (ref 8.6–10)
CHLORIDE SERPL-SCNC: 104 MMOL/L (ref 98–107)
CREAT SERPL-MCNC: 1.03 MG/DL (ref 0.67–1.17)
DEPRECATED HCO3 PLAS-SCNC: 21 MMOL/L (ref 22–29)
EOSINOPHIL # BLD AUTO: 0.2 10E3/UL (ref 0–0.7)
EOSINOPHIL NFR BLD AUTO: 4 %
ERYTHROCYTE [DISTWIDTH] IN BLOOD BY AUTOMATED COUNT: 12.9 % (ref 10–15)
GFR SERPL CREATININE-BSD FRML MDRD: >90 ML/MIN/1.73M2
GLUCOSE SERPL-MCNC: 97 MG/DL (ref 70–99)
HCT VFR BLD AUTO: 40.6 % (ref 40–53)
HGB BLD-MCNC: 13.3 G/DL (ref 13.3–17.7)
IMM GRANULOCYTES # BLD: 0 10E3/UL
IMM GRANULOCYTES NFR BLD: 1 %
LYMPHOCYTES # BLD AUTO: 0.8 10E3/UL (ref 0.8–5.3)
LYMPHOCYTES NFR BLD AUTO: 12 %
MCH RBC QN AUTO: 28.8 PG (ref 26.5–33)
MCHC RBC AUTO-ENTMCNC: 32.8 G/DL (ref 31.5–36.5)
MCV RBC AUTO: 88 FL (ref 78–100)
MONOCYTES # BLD AUTO: 0.7 10E3/UL (ref 0–1.3)
MONOCYTES NFR BLD AUTO: 11 %
NEUTROPHILS # BLD AUTO: 4.4 10E3/UL (ref 1.6–8.3)
NEUTROPHILS NFR BLD AUTO: 72 %
NRBC # BLD AUTO: 0 10E3/UL
NRBC BLD AUTO-RTO: 0 /100
PLATELET # BLD AUTO: 126 10E3/UL (ref 150–450)
POTASSIUM SERPL-SCNC: 3.8 MMOL/L (ref 3.4–5.3)
RBC # BLD AUTO: 4.62 10E6/UL (ref 4.4–5.9)
SODIUM SERPL-SCNC: 137 MMOL/L (ref 136–145)
WBC # BLD AUTO: 6.1 10E3/UL (ref 4–11)

## 2022-11-10 PROCEDURE — 250N000013 HC RX MED GY IP 250 OP 250 PS 637: Performed by: STUDENT IN AN ORGANIZED HEALTH CARE EDUCATION/TRAINING PROGRAM

## 2022-11-10 PROCEDURE — 80048 BASIC METABOLIC PNL TOTAL CA: CPT

## 2022-11-10 PROCEDURE — 85025 COMPLETE CBC W/AUTO DIFF WBC: CPT

## 2022-11-10 PROCEDURE — 250N000013 HC RX MED GY IP 250 OP 250 PS 637: Performed by: INTERNAL MEDICINE

## 2022-11-10 PROCEDURE — 99239 HOSP IP/OBS DSCHRG MGMT >30: CPT | Mod: GC | Performed by: INTERNAL MEDICINE

## 2022-11-10 PROCEDURE — 36415 COLL VENOUS BLD VENIPUNCTURE: CPT

## 2022-11-10 RX ORDER — LORAZEPAM 1 MG/1
1 TABLET ORAL AT BEDTIME
Qty: 1 TABLET | Refills: 0 | Status: ON HOLD | OUTPATIENT
Start: 2022-11-10 | End: 2023-07-30

## 2022-11-10 RX ORDER — LEVOFLOXACIN 750 MG/1
750 TABLET, FILM COATED ORAL DAILY
Qty: 8 TABLET | Refills: 0 | Status: SHIPPED | OUTPATIENT
Start: 2022-11-10 | End: 2022-11-18

## 2022-11-10 RX ADMIN — OXCARBAZEPINE 900 MG: 300 TABLET ORAL at 07:41

## 2022-11-10 RX ADMIN — LEVETIRACETAM 3000 MG: 750 TABLET, FILM COATED, EXTENDED RELEASE ORAL at 07:41

## 2022-11-10 ASSESSMENT — ACTIVITIES OF DAILY LIVING (ADL)
ADLS_ACUITY_SCORE: 18

## 2022-11-10 NOTE — PROGRESS NOTES
Social Work Brief Note:     assistance requested. Pt in need of metro transit token for ride home. SW supplied this to the patient. SW completed PCP handoff.  _____________________________     KATELYN Lane, LICSW  Advanced Practice Independent Clinical   [address] 5B Medicine Beds 9568-1589-2 & 5C Medicine Overflow Beds 5354-2376  [facility]   [email or web address]  M-F Phone [phone]   M-F Pager: [phone]  Fax: [phone]  Do me MPazF on [employer].

## 2022-11-10 NOTE — PROGRESS NOTES
Patient admitted from ed. Admitted for infection and stones. Has a mitrofanoff bladder and urology put in a tyler cath that is to be flushed every shift and when needed. Tyler was flushed when patient arrived to floor. Skin intact. piv in rt arm. Independent in room. Belongings in room cell phone  clothes and a back pack.

## 2022-11-10 NOTE — DISCHARGE SUMMARY
[facility]  Discharge Summary - Medicine & Pediatrics       Date of Admission:  11/7/2022  Date of Discharge:  11/10/2022 10:55 AM  Discharging Provider: Dr. Gunter  Discharge Service: Medicine Service, MIKE TEAM 5    Discharge Diagnoses     1. Sepsis secondary to E. coli from right pyelonephritis  2. Recurrent urinary calculi in the bladder and left renal collecting system  3. History of neurogenic bladder/cloacal exstrophy status post Mitrofanoff  4. Acute kidney injury  5. Intractable epilepsy    Follow-ups Needed After Discharge   Follow-up Appointments     Adult [facility] Follow-up and recommended labs and tests      Follow up with primary care provider, Álvaro Drew, within 7 days for   hospital follow- up.  The following labs/tests are recommended: CBC and   BMP.      Follow up with Urology as established.     Appointments on [place] and/or [facility] (with [facility] or [facility]   provider or service). Call [phone] if you haven't heard regarding   these appointments within 7 days of discharge.            Discharge Disposition   Discharged to home  Condition at discharge: Stable    Hospital Course     Olu Trinh is a 37 year old male with a pertinent past medical history of ADHD, intractable epilepsy without status epilepticus, neurogenic bladder secondary to bladder/cloacal exstrophy and multiple previous bladder stone removals who was admitted on 11/7/2022 with new right flank pain, subjective chills found to have right pyelonephritis and E. coli bacteremia.     #Severe sepsis secondary to E. coli from right pyelonephritis  # Recurrent urinary calculi in the bladder and left renal collecting system  # History of neurogenic bladder/cloacal exstrophy status post mitrofanoff  Came in with right flank, penile pain, fever and tachycardia found to have a urinalysis consistent with a urinary tract infection.  CT scanning of his abdomen and pelvis  showed findings concerning for pyelonephritis, recurrent urinary calculi and chronic hydronephrosis.  Patient had a leukocytosis of 19, fever of 100.8, tachycardia to 134 and a lactic acid of 2.2 consistent with severe sepsis.  Admission blood cultures grew E. coli.  He received ceftriaxone for treatment during his hospitalization before being transitioned to PO antibiotics. The patient had a Tyler catheter placed for decompression and had irrigation 3 times daily and as needed during this admission, and tyler was removed prior to discharge.  - Follow-up with your primary care provider  - Follow-up with urology  - Patient instructed to use sterile water/saline for irrigation at home  - Patient instructed to take levofloxacin 750 mg p.o. daily for 8 more days  - Self catheterization q2h until urine output normalizes     # ANABELL due to obstuction, improving  Patient had a creatinine of 1.29 on admission in the setting of likely obstruction and infecton, his baseline creatinine is 0.9.  His creatinine improved during the course of this admission.  - Follow-up with your primary care provider  - Follow-up with urology     # Intractable epilepsy  Patient reported recent increases in seizure prodrome symptoms.  He denied any recent seizures.  - Continue levetiracetam extended release 3000 mg p.o. daily  -Continue oxcarbazepine extended release 900 mg p.o. daily  - Take 1 mg lorazepam p.o. nightly for 1 more day per outpatient antiepileptic clinic  - Follow-up with outpatient antiepileptic clinic who should also follow-up oxcarbazepine level    Consultations This Hospital Stay   UROLOGY IP CONSULT  MEDICATION HISTORY IP PHARMACY CONSULT  NURSING TO CONSULT FOR VASCULAR ACCESS CARE IP CONSULT    Code Status   Prior       The patient was discussed with Dr. Gunter who agrees with the above discharge plan.     Bhavik Kaur DO  PGY-2, Internal Medicine  [facility] [address] [place] [address]  BANK  [address] [facility] [address]  Phone: [phone]  ______________________________________________________________________    Physical Exam   Vital Signs: Temp: 98.1  F (36.7  C) Temp src: Oral BP: 134/83 Pulse: 77   Resp: 15 SpO2: 93 % O2 Device: None (Room air)    Weight: 277 lbs 1.6 oz    General: Alert and oriented without distress  HEENT: Normocephalic/atraumatic  Respiratory: CTAB without increased respiratory effort or accessory muscle use  Cardiovascular: RRR without murmurs, rubs or gallop. S1, S2 intact.  Abdomen: Bowel sounds present. Soft, non-distended without tenderness to palpation or rebound.   Skin: No overt lesions, bruises, rashes or bleeding on exposed skin surfaces.      Primary Care Physician   Álvaro Drew    Discharge Orders      Reason for your hospital stay    You were admitted for a urinary tract infection and found to have E. coli bacteria in the blood. Your symptoms improved with appropriate treatment and we believe you are safe to discharge with close outpatient follow up. Please use cold/lukewarm sterile tap water/saline for bladder irrigation until outpatient follow up with Urology. Additionally, please catheterize every two hours until urine output has decreased to a normal amount.     Activity    Your activity upon discharge: activity as tolerated     Adult [facility] Follow-up and recommended labs and tests    Follow up with primary care provider, Álvaro Drew, within 7 days for hospital follow- up.  The following labs/tests are recommended: CBC and BMP.      Follow up with Urology as established.     Appointments on [place] and/or [facility] (with [facility] or [facility] provider or service). Call [phone] if you haven't heard regarding these appointments within 7 days of discharge.     Diet    Follow this diet upon discharge: Orders Placed This Encounter      Combination Diet Regular Diet Adult       Significant Results and Procedures   Most Recent 3 CBC's:Recent Labs    Lab Test 11/10/22  0517 11/09/22  0552 11/08/22  0451   WBC 6.1 10.6 17.1*   HGB 13.3 13.5 14.8   MCV 88 88 88   * 121* 140*   ,   Results for orders placed or performed in visit on 11/07/22   CT Abdomen wo & w & Pelvis w Contrast    Narrative    EXAMINATION: CT ABDOMEN WO & W & PELVIS W CONTRAST,  11/7/2022 11:26 AM    TECHNIQUE:  TECHNIQUE:  Helical CT images from the lung bases through  the symphysis pubis were obtained  without and with contrast using CT  urogram protocol. Contrast dose: Isovue 370  120 mls    COMPARISON: 6/3/2021 renal ultrasound, 12/2/2020 abdomen/pelvis CT    HISTORY: History of stones, pelvic/penile/hip pain, difficulty  catheterizing; Urinary tract infection without hematuria, site  unspecified; Pelvic pain in male    FINDINGS:  Urinary System    Right kidney and ureter: Chronically dilated ureter and mild  hydronephrosis. Persistent perinephric fluid and stranding, similar to  previous exam. Suboptimal opacification of the renal collecting system  limits evaluation for intraluminal pathology.    Left kidney and ureter: Chronically dilated ureter and mild  hydronephrosis. Similar appearance of urinary stone in the inferior  pole left renal collecting system. Suboptimal opacification of the  renal collecting system limits evaluation for intraluminal pathology.    Bladder: Postsurgical appearance of prior bladder augmentation for  exstrophy repair with urinary stoma in the right lower quadrant  abdominal wall. Similar appearance of calculus measuring up to 11 mm,  located between the ureterovesicular junctions. Limited evaluation for  intraluminal pathology due to incomplete opacification of the bladder.    Abdomen/Pelvis    Liver: Normal morphology. No discernible mass.    Biliary: No radiodense gallstones. No intrahepatic or extrahepatic  biliary dilation.     Pancreas: No pancreatic mass or inflammatory changes.     Spleen: Splenomegaly measuring up to 14.3 cm. Small splenule  adjacent  to the spleen.     Adrenals: No adrenal nodule.     Vascular: Nonaneurysmal abdominal aorta.       Bowel: Colon diverticulosis without evidence of diverticulitis. No  dilated bowel.     Peritoneum/Retroperitoneum: No significant free fluid or free air.      Lymph nodes: Stable appearance of prominent bilateral retroperitoneal  lymph nodes at the level of the renal bryanna.    Lower thorax: The lung bases are clear.     Bones/Soft Tissues: Diastasis of the symphysis pubis related to  bladder exstrophy. Degenerative changes of the sacroiliac joints  No  acute or aggressive appearing bone lesion.        Impression    IMPRESSION: 1. Right perinephric stranding raises concern for  pyelonephritis versus chronic changes given similar appearance on  comparison exam from 2020. Correlation with clinical parameters  recommended.   2. Stable appearance of bladder augmentation changes and chronic  bilateral hydroureteronephrosis.   3. Unchanged appearance of urinary calculi in the bladder and left  renal collecting system.   4. Unable to evaluate urinary tract for intraluminal masses due to  inadequate contrast opacification.     I have personally reviewed the examination and initial interpretation  and I agree with the findings.    JULIANN HUITRON MD         SYSTEM ID:  [id number]       Discharge Medications   Discharge Medication List as of 11/10/2022 10:18 AM      START taking these medications    Details   levofloxacin (LEVAQUIN) 750 MG tablet Take 1 tablet (750 mg) by mouth daily for 8 days, Disp-8 tablet, R-0, E-Prescribe         CONTINUE these medications which have CHANGED    Details   LORazepam (ATIVAN) 1 MG tablet Take 1 tablet (1 mg) by mouth At Bedtime, Disp-1 tablet, R-0, Local PrintWill be signed by Dr. Gunter         CONTINUE these medications which have NOT CHANGED    Details   Cranberry 500 MG CAPS Take 500 mg by mouth, Historical      ibuprofen (ADVIL/MOTRIN) 200 MG capsule Take 200 mg by mouth every 4  hours as needed for fever, Historical      levETIRAcetam (KEPPRA XR) 500 MG 24 hr tablet Take 500 mg by mouth daily Takes 4 tablet every am, Historical      OXTELLAR  MG 24 hr tablet Take 900 mg by mouth daily, TONI, Historical         STOP taking these medications       ciprofloxacin (CIPRO) 500 MG tablet Comments:   Reason for Stopping:             Allergies   Allergies   Allergen Reactions     Ondansetron      Other reaction(s): Hallucinations     Sulfa Drugs      unknown      Physician Attestation   I saw and evaluated this patient prior to discharge.  I discussed the patient with the resident/fellow and agree with plan of care as documented in the note.      I personally reviewed vital signs, medications and labs.    I personally spent 35 minutes on discharge activities.    Yany Gunter MD  Date of Service (when I saw the patient): 11/10/22

## 2022-11-10 NOTE — PROGRESS NOTES
Status : Recurrent urinary  Calculi in the bladder and left renal collecting system.  Neuro: A&O x 4   GI: +bs +flatus no BM   : Neurogenic  bladder ( Mitrofanoff  Patent 550 ml output)I irrigated every shift.  Resp: Denies SOB r.a sating 90s.  Mobility: Independent in room.  Cardiac: WDL  Skin: No deficit   Lines/Drains: 2 piv SL.  Pain: Denies pain  Behavior: calm   VS: Blood pressure (!) 132/90, pulse 86, temperature 98  F (36.7  C), temperature source Oral, resp. rate 13, weight 125.7 kg (277 lb 1.6 oz), SpO2 99 %.      Plan of Care: Possible discharge tomorrow.

## 2022-11-10 NOTE — PLAN OF CARE
Assumed cares [id number]     /83 (BP Location: Left arm)   Pulse 77   Temp 98.1  F (36.7  C) (Oral)   Resp 15   Wt 125.7 kg (277 lb 1.6 oz)   SpO2 93%   BMI 37.58 kg/m       Pain: Patient denied pain  Neuro: A&Ox4.   Respiratory: Lungs clear and equal. On RA.   Cardiac/Neurovascular: HR and pulse WDL. No numbness or tingling reported.  GI/: Suprapubic catheter removed prior to discharge. Educated to straight cath more frequently at home.   Nutrition: Regular diet.   Activity: Independent.   Skin: No skin issues.   Lines: PIV's removed.   Events this shift: Patient was able to meet requirements for discharge. AVS was printed and given to patient. Patient educated about importance of straight cathing ever 2 hours at home until urine output decreases. Patient told to  medication from pharmacy. Pt able to walk self out and take light rail home.      Plan: Discharged at 11.     Goal Outcome Evaluation:      Plan of Care Reviewed With: patient    Overall Patient Progress: improvingOverall Patient Progress: improving    Outcome Evaluation: Met requirements for discharge.

## 2022-11-11 ENCOUNTER — PATIENT OUTREACH (OUTPATIENT)
Dept: CARE COORDINATION | Facility: CLINIC | Age: 37
End: 2022-11-11

## 2022-11-11 LAB
10OH-CARBAZEPINE SERPL-MCNC: 3 UG/ML
BACTERIA UR CULT: ABNORMAL
BACTERIA UR CULT: ABNORMAL

## 2022-11-11 NOTE — PROGRESS NOTES
"Clinic Care Coordination Contact  [facility]: Post-Discharge Note  SITUATION                                                      Admission:    Admission Date: 11/07/22   Reason for Admission: Pyelonephritis  Discharge:   Discharge Date: 11/10/22  Discharge Diagnosis: Sepsis secondary to E. coli from right pyelonephritis, Recurrent urinary calculi in the bladder and left renal collecting system, History of neurogenic bladder/cloacal exstrophy status post Mitrofanoff,  Acute kidney injury, Intractable epilepsy    BACKGROUND                                                      Per hospital discharge summary and inpatient provider notes:    Olu Trinh is a 37 year old male with PMH of ADHD, intractable epilepsy without status epilepticus, neurogenic bladder secondary to bladder/cloacal exstrophy and previous bladder stone removals (2015, 2018, 2021 & 2022).      He has been feeling off since yesterday 11/7/22. Started experiencing right hip pain, penile pain, difficulty cathing himself, and subjective fever today. Reports intermittently will notice some blood when he urinates with his penis. Went to urology clinic and was worked up for possible UTI. CT A&P obtained with concerns for pyelonephritis so he was told to come into the hospital. Denies respiratory symptoms, abdominal pain, N/V, diarrhea.     Lives with his room mates. Denies smoking. Drinks 3-4 times/week, 2-3 drinks/day. Denies drug use.     ASSESSMENT      Discharge Assessment  How are you doing now that you are home?: \"doing good\"  How are your symptoms? (Red Flag symptoms escalate to triage hotline per guidelines): Improved  Do you feel your condition is stable enough to be safe at home until your provider visit?: Yes  Does the patient have their discharge instructions? : Yes  Does the patient have questions regarding their discharge instructions? : No  Were you started on any new medications or were there changes to any of your " previous medications? : Yes  Does the patient have all of their medications?: Yes  Do you have questions regarding any of your medications? : Yes (see comment) (He is wondering if he should stop taking the first antibiotic he was perscribed before he went to the hospital. [facility] let him know the name of the new medication they started and the medication they would like him to stop based on his AVS)  Do you have all of your needed medical supplies or equipment (DME)?  (i.e. oxygen tank, CPAP, cane, etc.): Yes  Discharge follow-up appointment scheduled within 14 calendar days? : No  Is patient agreeable to assistance with scheduling? : No    Post-op ([facility] CTA Only)  If the patient had a surgery or procedure, do they have any questions for a nurse?: No (Patient may call writer back to confirm names of medications he should stop taking. [facility] let him know if he has further questions beyond which ones to take he should speak with a nurse. He denies other questions at this time)    PLAN                                                      Outpatient Plan:        Follow up with primary care provider, Álvaro Drew, within 7 days for   hospital follow- up.  The following labs/tests are recommended: CBC and   BMP.       Follow up with Urology as established.     Future Appointments   Date Time Provider Department Center   1/16/2023  4:00 PM Manjit Nicolas MD [facility]         For any urgent concerns, please contact our 24 hour nurse triage line: [phone] ([phone])       Alexa Adames  Community Health Worker  [facility]  Ph: [phone]

## 2022-11-14 ENCOUNTER — OFFICE VISIT (OUTPATIENT)
Dept: FAMILY MEDICINE | Facility: CLINIC | Age: 37
End: 2022-11-14
Payer: COMMERCIAL

## 2022-11-14 ENCOUNTER — LAB (OUTPATIENT)
Dept: LAB | Facility: CLINIC | Age: 37
End: 2022-11-14
Payer: COMMERCIAL

## 2022-11-14 VITALS
TEMPERATURE: 97.4 F | WEIGHT: 277.6 LBS | RESPIRATION RATE: 16 BRPM | OXYGEN SATURATION: 99 % | HEART RATE: 65 BPM | SYSTOLIC BLOOD PRESSURE: 122 MMHG | DIASTOLIC BLOOD PRESSURE: 81 MMHG | BODY MASS INDEX: 37.65 KG/M2

## 2022-11-14 DIAGNOSIS — N17.9 AKI (ACUTE KIDNEY INJURY) (H): ICD-10-CM

## 2022-11-14 DIAGNOSIS — R60.0 LEG EDEMA, RIGHT: ICD-10-CM

## 2022-11-14 DIAGNOSIS — N13.39 OTHER HYDRONEPHROSIS: Primary | ICD-10-CM

## 2022-11-14 DIAGNOSIS — N12 PYELONEPHRITIS: ICD-10-CM

## 2022-11-14 DIAGNOSIS — Q64.10 EXSTROPHY OF BLADDER SEQUENCE: ICD-10-CM

## 2022-11-14 LAB
ANION GAP SERPL CALCULATED.3IONS-SCNC: 10 MMOL/L (ref 7–15)
BASOPHILS # BLD AUTO: 0.1 10E3/UL (ref 0–0.2)
BASOPHILS NFR BLD AUTO: 1 %
BUN SERPL-MCNC: 20.8 MG/DL (ref 6–20)
CALCIUM SERPL-MCNC: 10.4 MG/DL (ref 8.6–10)
CHLORIDE SERPL-SCNC: 107 MMOL/L (ref 98–107)
CREAT SERPL-MCNC: 1.16 MG/DL (ref 0.67–1.17)
DEPRECATED HCO3 PLAS-SCNC: 24 MMOL/L (ref 22–29)
EOSINOPHIL # BLD AUTO: 0.3 10E3/UL (ref 0–0.7)
EOSINOPHIL NFR BLD AUTO: 3 %
ERYTHROCYTE [DISTWIDTH] IN BLOOD BY AUTOMATED COUNT: 12.3 % (ref 10–15)
GFR SERPL CREATININE-BSD FRML MDRD: 83 ML/MIN/1.73M2
GLUCOSE SERPL-MCNC: 95 MG/DL (ref 70–99)
HCT VFR BLD AUTO: 46.1 % (ref 40–53)
HGB BLD-MCNC: 15.3 G/DL (ref 13.3–17.7)
IMM GRANULOCYTES # BLD: 0.3 10E3/UL
IMM GRANULOCYTES NFR BLD: 4 %
LYMPHOCYTES # BLD AUTO: 2.4 10E3/UL (ref 0.8–5.3)
LYMPHOCYTES NFR BLD AUTO: 30 %
MCH RBC QN AUTO: 29.1 PG (ref 26.5–33)
MCHC RBC AUTO-ENTMCNC: 33.2 G/DL (ref 31.5–36.5)
MCV RBC AUTO: 88 FL (ref 78–100)
MONOCYTES # BLD AUTO: 0.4 10E3/UL (ref 0–1.3)
MONOCYTES NFR BLD AUTO: 5 %
NEUTROPHILS # BLD AUTO: 4.5 10E3/UL (ref 1.6–8.3)
NEUTROPHILS NFR BLD AUTO: 57 %
NRBC # BLD AUTO: 0 10E3/UL
NRBC BLD AUTO-RTO: 0 /100
PLATELET # BLD AUTO: 252 10E3/UL (ref 150–450)
POTASSIUM SERPL-SCNC: 5.1 MMOL/L (ref 3.4–5.3)
RBC # BLD AUTO: 5.25 10E6/UL (ref 4.4–5.9)
SODIUM SERPL-SCNC: 141 MMOL/L (ref 136–145)
WBC # BLD AUTO: 7.8 10E3/UL (ref 4–11)

## 2022-11-14 PROCEDURE — 36415 COLL VENOUS BLD VENIPUNCTURE: CPT | Performed by: PATHOLOGY

## 2022-11-14 PROCEDURE — 99214 OFFICE O/P EST MOD 30 MIN: CPT | Performed by: NURSE PRACTITIONER

## 2022-11-14 PROCEDURE — 85025 COMPLETE CBC W/AUTO DIFF WBC: CPT | Performed by: PATHOLOGY

## 2022-11-14 PROCEDURE — 80048 BASIC METABOLIC PNL TOTAL CA: CPT | Performed by: PATHOLOGY

## 2022-11-14 RX ORDER — LEVETIRACETAM 500 MG/1
3000 TABLET, FILM COATED, EXTENDED RELEASE ORAL
COMMUNITY
Start: 2022-03-02

## 2022-11-14 ASSESSMENT — ENCOUNTER SYMPTOMS
HEMATURIA: 0
DYSURIA: 0
CONSTIPATION: 0
NAUSEA: 0
PALPITATIONS: 0
FATIGUE: 0
DIFFICULTY URINATING: 0
ACTIVITY CHANGE: 0
VOMITING: 0
SHORTNESS OF BREATH: 0
CHILLS: 0
ABDOMINAL PAIN: 0
FLANK PAIN: 0
FEVER: 0

## 2022-11-14 ASSESSMENT — PAIN SCALES - GENERAL: PAINLEVEL: NO PAIN (0)

## 2022-11-14 NOTE — PROGRESS NOTES
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 He was admitted on 11/7/22 for E. Coli bactremia and pyelonephritis. He was discharged on levofloxacin for an 8 days course following his discharge on 11/10/22. He was recommended to f/u with PCP for repeat labs to assess infection and kidney function as he had ANABELL inpatient. During hospitalization, he endorsed increase in sezure prodromal symptoms and was given lorazepam nightly and advised to f/u with outpatient antiepileptic clinic.     Pyelonephritis, hydronephrosis, and ANABELL- Patient denies any pain, fever, or chills since discharge. Urine output is clear and yellow. He is taking 750 mg levofloxacin daily as ordered (on day 4 of 8 day course). Creatinine improved from peak of 1.47 inpatient to 1.03 upon discharge. Unchanged urinary calculi noted in bladder and chronic bilateral hydroureteronephrosis on CT 11/7/22.     Exstrophy of bladder sequence: Patient denies any pain or signs of infection at urostomy site. He does admit to using tap water for his bladder irrigation at home.    R leg edema: Patient reports 10 years of unchanged swelling in his R lower extremity with no previous evaluation. He denies any pain, warmth, or erythema. He also denies any CP, SOB, or change in his swelling since his hospitalization. No other acute concerns/symptoms at time of exam.      Review of Systems   Constitutional: Negative for activity change, chills, fatigue and fever.   Respiratory: Negative for shortness of breath.    Cardiovascular: Positive for peripheral edema. Negative for chest pain and palpitations.        10 years of RLE swelling without pain or  erythema, unknown cause per patient   Gastrointestinal: Negative for abdominal pain, constipation, nausea and vomiting.   Genitourinary: Negative for difficulty urinating, dysuria, flank pain, hematuria and penile pain.   Constitutional, HEENT, cardiovascular, pulmonary, gi and gu systems are negative, except as otherwise noted.      Allergies   Allergen Reactions     Ondansetron      Other reaction(s): Hallucinations     Sulfa Drugs      unknown        Current Outpatient Medications   Medication Instructions     Cranberry 500 mg     ibuprofen (ADVIL/MOTRIN) 200 mg, EVERY 4 HOURS PRN     levETIRAcetam (KEPPRA XR) 500 mg, Oral, DAILY, Takes 4 tablet every am      levofloxacin (LEVAQUIN) 750 mg, Oral, DAILY     LORazepam (ATIVAN) 1 mg, Oral, AT BEDTIME     Oxtellar  mg, Oral, DAILY       Past Medical History:   Diagnosis Date     Attention deficit disorder with hyperactivity(314.01)      Bladder stone 06/02/2015     Exstrophy of bladder sequence 06/29/2015     Learning disability      Neurogenic bladder, NOS 10/30/2012     Obesity (BMI 35.0-39.9 without comorbidity)      Other hydronephrosis 06/02/2015     Unspecified epilepsy with intractable epilepsy 10/22/2013        Family History   Problem Relation Age of Onset     Seizure Disorder Mother      Breast Cancer Mother      Cancer Father         Social History     Tobacco Use     Smoking status: Never     Smokeless tobacco: Never   Vaping Use     Vaping Use: Never used   Substance Use Topics     Alcohol use: Yes     Comment: Alcoholic beverage once or twice per week.     Drug use: No        History   Sexual Activity     Sexual activity: Never        No flowsheet data found.     Immunization History   Administered Date(s) Administered     COVID-19,PF,Pfizer (12+ Yrs) 03/02/2021, 03/23/2021     Flu, Unspecified 11/05/2015, 11/18/2016     Hep B, Peds or Adolescent 08/07/1997, 09/09/1997, 07/17/1998     HepB 03/14/2006     HepB, Unspecified 03/14/2006     HepB-Adult  03/14/2006     Historical DTP/aP 1985, 1985, 1985, 01/16/1987, 08/30/1990     Influenza Vaccine IM > 6 months Valent IIV4 (Alfuria,Fluzone) 11/01/2016, 10/26/2017, 09/30/2018, 02/22/2022     MMR 07/11/1986, 08/07/1997     Mantoux Tuberculin Skin Test 03/14/2006     Polio, Unspecified  1985, 1985, 01/16/1987, 08/30/1990     Td (Adult), Adsorbed 08/07/1997     Tdap (Adacel,Boostrix) 08/26/2016        Health Maintenance components reviewed - Seasonal Influenza vaccine status is due & Covid-19 vaccine status is due.         OBJECTIVE     /81 (BP Location: Right arm, Patient Position: Sitting, Cuff Size: Adult Large)   Pulse 65   Temp 97.4  F (36.3  C) (Oral)   Resp 16   Wt 125.9 kg (277 lb 9.6 oz)   SpO2 99%   BMI 37.65 kg/m       Labs:  Lab Results   Component Value Date    WBC 6.1 11/10/2022    HGB 13.3 11/10/2022    HCT 40.6 11/10/2022     (L) 11/10/2022    CHOL 291 (H) 05/11/2018    TRIG 139 05/11/2018    HDL 58 05/11/2018    ALT 24 11/07/2022    AST 28 11/07/2022     11/10/2022    BUN 14.8 11/10/2022    CO2 21 (L) 11/10/2022        Physical Exam  Constitutional:       General: He is not in acute distress.     Appearance: Normal appearance. He is not ill-appearing.   HENT:      Head: Normocephalic.   Eyes:      Pupils: Pupils are equal, round, and reactive to light.   Cardiovascular:      Rate and Rhythm: Normal rate and regular rhythm.      Pulses: Normal pulses.      Heart sounds: Normal heart sounds. No murmur heard.     Comments: No pain to palpation of RLE, full ROM without pain. Calf squeeze negative. No erythema noted. Superficial varicose veins noted around R ankle. RLE marginally larger than LLE.  Pulmonary:      Effort: Pulmonary effort is normal. No respiratory distress.      Breath sounds: Normal breath sounds. No rhonchi.   Abdominal:      General: Bowel sounds are normal. There is no distension.      Palpations: Abdomen is soft.      Tenderness:  There is no abdominal tenderness. There is no guarding.   Genitourinary:         Comments: Urinary stoma site in RLQ pink, dry, and intact, leaking clear urine upon palpation of abdomen.  Musculoskeletal:      Cervical back: Neck supple. No tenderness.      Right lower le+ Edema present.   Lymphadenopathy:      Cervical: No cervical adenopathy.   Skin:     General: Skin is warm and dry.      Capillary Refill: Capillary refill takes less than 2 seconds.   Neurological:      General: No focal deficit present.      Mental Status: He is alert and oriented to person, place, and time.   Psychiatric:         Mood and Affect: Mood normal.         Behavior: Behavior normal.         Thought Content: Thought content normal.         Judgment: Judgment normal.               ASSESSMENT/PLAN     1. Other hydronephrosis  CT from hospitalization shows caliculi in bladder and bilateral hydronephrosis. Patient is following with urology for ongoing management.     2. Pyelonephritis  Exam reassuring in clinic today. Vital signs stable, patient well appearing. Reports yellow, clear urine. Will recheck CBC for assessment of infection on PO abx following discharge. Will recheck BMP to reassess kidney function.  - Basic metabolic panel; Future  - CBC with platelets differential; Future    3. ANABELL (acute kidney injury) (H)  BMP to reassess kidney function, f/u ANABELL.  - Basic metabolic panel; Future    4. Exstrophy of bladder sequence  Provided DME order for sterile water for bladder irrigation to patient's medical supply, [employer].  - Miscellaneous Order for DME - ONLY FOR DME    5. Leg edema, right  Chronic issue with no recent change in size, pain, warmth, or redness. Will check RLE US. Placed DME order for compression stocking for suspected venous insufficiency.   - US Lower Extremity Venous Duplex Right; Future  - Compression Sleeve/Stocking Order for DME - ONLY FOR DME      Education provided on at-home supportive measures  including use of sterile water/saline for bladder irrigation as ordered by urology team.    Follow-Up:  - Follow up in as needed, or if symptoms worsen or fail to improve. Instructed to follow-up with Antiepileptic Clinic and Urology. Disposition pending lab results.    Options for treatment and follow-up care were reviewed with the patient. Patient engaged in the decision making process and verbalized understanding of the options discussed and agreed with the final plan.  AVS printed and given to patient.    Note by: Jenny Garcia, VA[employer] student      I, Álvaro GOMEZ CNP reviewed and verified the nurse practitioner (NP)  student's documentation of the patient's history. I performed the exam and the medical decision making activities with the NP student, who was present for learning purposes.    PATRICIA Enciso, CNP  [facility] [employer]  [address] [place] [id number]  [phone]

## 2022-12-12 ENCOUNTER — PRE VISIT (OUTPATIENT)
Dept: UROLOGY | Facility: CLINIC | Age: 37
End: 2022-12-12

## 2022-12-12 NOTE — TELEPHONE ENCOUNTER
Reason for visit: Post-op Cystoscopy s/p Cystolithotomy 9/9/22     Dx/Hx/Sx: Bladder stone    Records/imaging/labs/orders: In EPIC    At Rooming: MONICA Kraus  12/12/22  2:04 PM

## 2023-01-14 NOTE — PROGRESS NOTES
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 Has had bladder stones removed in past (2021, 2018 Dr. Ibrahim, 2015 percutaneous). Of note, chronic bilateral hydronephrosis. Recent office cystoscopy demonstrated 1 cm bladder stone. Caths per mitrofanoff stoma into augmented bladder with 14fr straight cath q3-4h, no irrigation. No incontinence between caths. We discussed cystoscopy through the channel and a percutaneous stone removal. The patient was counseled on the alternatives, risks, and benefits and elected to proceed with the above stated procedure including urinary obstruction, infection, pain, bleeding, need for future procedures and risks of anesthesia. The bladder neck is not closed (had bladder neck reconstruction as child). Mitrofanoff is Present. She had cystolithalopaxy on 9/9/22. Cysto today to rule out any further stones or mucus         Description of Procedure:    After informed consent was obtained, the patient was brought to the procedure room and placed in the supine position.  The catheterizable was prepped and draped in a sterile fashion.    There was no stenosis or erythema at the stomal site.     A 16F flexible cystoscope was introduced through the channel. This was done without resistance.   The channel showed  no evidence of stricture.   The bladder was free of tumors or stones.     The augment appeared to be healthy   There was some dense mucus in the dependent bladder which was irrigated out.   The bladder was clear at the end of irrigating     The cystoscope was removed and the findings were explained to the patient.    Assessment and Plan:  37yoM with exstrophy, neurogenic bladder managed with CIC through Mitrofanoff.      Recommended daily irrigation   RTC for cysto in 4 months to survey for bladder stones and mucus. He had some blood from the tip of the penis so at next cysto will perform urethroscopy at that time     Manjit Nicolas MD  January 16, 2023

## 2023-01-16 ENCOUNTER — OFFICE VISIT (OUTPATIENT)
Dept: UROLOGY | Facility: CLINIC | Age: 38
End: 2023-01-16
Payer: COMMERCIAL

## 2023-01-16 VITALS
HEIGHT: 72 IN | SYSTOLIC BLOOD PRESSURE: 145 MMHG | BODY MASS INDEX: 36.57 KG/M2 | WEIGHT: 270 LBS | DIASTOLIC BLOOD PRESSURE: 81 MMHG | HEART RATE: 70 BPM

## 2023-01-16 DIAGNOSIS — N31.9 NEUROGENIC BLADDER: Primary | ICD-10-CM

## 2023-01-16 PROCEDURE — 52000 CYSTOURETHROSCOPY: CPT | Performed by: STUDENT IN AN ORGANIZED HEALTH CARE EDUCATION/TRAINING PROGRAM

## 2023-01-16 ASSESSMENT — PAIN SCALES - GENERAL: PAINLEVEL: NO PAIN (0)

## 2023-01-16 NOTE — LETTER
1/16/2023       RE: Olu Trinh  [address]  [facility] [address]     Dear Colleague,    Thank you for referring your patient, Olu Trinh, to the [facility] [place] [facility]. Please see a copy of my visit note below.      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 Has had bladder stones removed in past (2021, 2018 Dr. Ibrahim, 2015 percutaneous). Of note, chronic bilateral hydronephrosis. Recent office cystoscopy demonstrated 1 cm bladder stone. Caths per mitrofanoff stoma into augmented bladder with 14fr straight cath q3-4h, no irrigation. No incontinence between caths. We discussed cystoscopy through the channel and a percutaneous stone removal. The patient was counseled on the alternatives, risks, and benefits and elected to proceed with the above stated procedure including urinary obstruction, infection, pain, bleeding, need for future procedures and risks of anesthesia. The bladder neck is not closed (had bladder neck reconstruction as child). Mitrofanoff is Present. She had cystolithalopaxy on 9/9/22. Cysto today to rule out any further stones or mucus         Description of Procedure:    After informed consent was obtained, the patient was brought to the procedure room and placed in the supine position.  The catheterizable was prepped and draped in a sterile fashion.    There was no stenosis or erythema at the stomal site.     A 16F flexible cystoscope was introduced through the channel. This was done without resistance.   The channel showed  no evidence of stricture.   The bladder was free of tumors or stones.     The augment  appeared to be healthy   There was some dense mucus in the dependent bladder which was irrigated out.   The bladder was clear at the end of irrigating     The cystoscope was removed and the findings were explained to the patient.    Assessment and Plan:  37yoM with exstrophy, neurogenic bladder managed with CIC through Mitrofanoff.     Recommended daily irrigation   RTC for cysto in 4 months to survey for bladder stones and mucus. He had some blood from the tip of the penis so at next cysto will perform urethroscopy at that time     Manjit Nicolas MD  January 16, 2023

## 2023-01-16 NOTE — PATIENT INSTRUCTIONS
"Please schedule a cystoscopy in 4 months where we will look urethrally.  AFTER YOUR CYSTOSCOPY        You have just completed a cystoscopy, or \"cysto\", which allowed your physician to learn more about your bladder (or to remove a stent placed after surgery). We suggest that you continue to avoid caffeine, fruit juice, and alcohol for the next 24 hours, however, you are encouraged to return to your normal activities.         A few things that are considered normal after your cystoscopy:     * Small amount of bleeding (or spotting) that clears within the next 24 hours     * Slight burning sensation with urination     * Sensation to of needing to avoid more frequently     * The feeling of \"air\" in your urine     * Mild discomfort that is relieved with Tylenol        Please contact our office promptly if you:     * Develop a fever above 101 degrees     * Are unable to urinate     * Develop bright red blood that does not stop     * Severe pain or swelling         Please contact our office with any concerns or questions @[email or web address].  "

## 2023-03-22 ENCOUNTER — TELEPHONE (OUTPATIENT)
Dept: UROLOGY | Facility: CLINIC | Age: 38
End: 2023-03-22

## 2023-03-22 ENCOUNTER — LAB (OUTPATIENT)
Dept: LAB | Facility: CLINIC | Age: 38
End: 2023-03-22
Payer: COMMERCIAL

## 2023-03-22 ENCOUNTER — MYC MEDICAL ADVICE (OUTPATIENT)
Dept: UROLOGY | Facility: CLINIC | Age: 38
End: 2023-03-22

## 2023-03-22 DIAGNOSIS — R82.90 CLOUDY URINE: ICD-10-CM

## 2023-03-22 DIAGNOSIS — R39.15 URGENCY OF URINATION: ICD-10-CM

## 2023-03-22 DIAGNOSIS — R82.90 CLOUDY URINE: Primary | ICD-10-CM

## 2023-03-22 DIAGNOSIS — N39.0 URINARY TRACT INFECTION: Primary | ICD-10-CM

## 2023-03-22 DIAGNOSIS — R35.0 URINARY FREQUENCY: ICD-10-CM

## 2023-03-22 LAB
ALBUMIN UR-MCNC: 200 MG/DL
APPEARANCE UR: ABNORMAL
BACTERIA #/AREA URNS HPF: ABNORMAL /HPF
BILIRUB UR QL STRIP: NEGATIVE
COLOR UR AUTO: ABNORMAL
GLUCOSE UR STRIP-MCNC: NEGATIVE MG/DL
HGB UR QL STRIP: ABNORMAL
KETONES UR STRIP-MCNC: NEGATIVE MG/DL
LEUKOCYTE ESTERASE UR QL STRIP: ABNORMAL
NITRATE UR QL: NEGATIVE
PH UR STRIP: 6.5 [PH] (ref 5–7)
RBC URINE: 5 /HPF
SP GR UR STRIP: 1.01 (ref 1–1.03)
SQUAMOUS EPITHELIAL: <1 /HPF
UROBILINOGEN UR STRIP-MCNC: NORMAL MG/DL
WBC CLUMPS #/AREA URNS HPF: PRESENT /HPF
WBC URINE: 162 /HPF

## 2023-03-22 PROCEDURE — 87186 SC STD MICRODIL/AGAR DIL: CPT | Performed by: PATHOLOGY

## 2023-03-22 PROCEDURE — 81001 URINALYSIS AUTO W/SCOPE: CPT | Performed by: PATHOLOGY

## 2023-03-22 PROCEDURE — 87086 URINE CULTURE/COLONY COUNT: CPT | Performed by: PATHOLOGY

## 2023-03-22 PROCEDURE — 99000 SPECIMEN HANDLING OFFICE-LAB: CPT | Performed by: PATHOLOGY

## 2023-03-22 NOTE — TELEPHONE ENCOUNTER
[facility] Call Center    Phone Message    May a detailed message be left on voicemail: yes     Reason for Call: Other: .  Pt calling stating he is experiencing pain in penis and pain in back, pt says he may have a UTI or bladder stones. PT is requesting [facility] place a order for pt to have a UA if possible. Please call pt     Action Taken: Message routed to:  Other: Uro    Travel Screening: Not Applicable

## 2023-03-22 NOTE — TELEPHONE ENCOUNTER
Spoke to pt. Pt reports that he thinks that he has a UTI. He reports symptoms started yesterday. He states that he started panting yesterday, but not as much today. Urine is hazy looking. Pt is not having issues with passing catheter through mitrofanoff. Noted increased urgency and frequency. No hematuria. No fever. Reports intermittent pain yesterday to penis, but no pain today. He thinks there might be a stone there, but denies any discomfort today. Having a little bit of lower back pain, rated 2/10. Was worse yesterday. Not taking taking anything for the pain. Drinking adequate amount of water. Advised to get UA/UC done. Also advised to take Tylenol, ibuprofen, and heating pad for pain as needed. Pt verbalized understanding.  Chart and problems list reviewed.    Kristin Grant MSN RN    Orders Placed This Encounter   Procedures     Routine UA with microscopic - No culture     Standing Status:   Future     Standing Expiration Date:   3/22/2024     Urine Culture Aerobic Bacterial     Standing Status:   Future     Standing Expiration Date:   3/22/2024

## 2023-03-23 LAB — BACTERIA UR CULT: ABNORMAL

## 2023-03-24 RX ORDER — NITROFURANTOIN 25; 75 MG/1; MG/1
100 CAPSULE ORAL 2 TIMES DAILY
Qty: 14 CAPSULE | Refills: 0 | Status: SHIPPED | OUTPATIENT
Start: 2023-03-24 | End: 2023-07-30

## 2023-03-24 NOTE — TELEPHONE ENCOUNTER
Manjit Collins MD Bratsch, Angie J, RN  Phone Number: [phone]     Hello,   UC is back. Can we please send 7 days of NF 100mg BID     Thank you   Cally     Spoke to pt. Informed him of UC results and need for treatment. Confirmed preferred pharmacy. Pt states he has been flushing frequently and noting mucus. Advised to continue to flush and increase water intake. Pt verbalized understanding.      Kristin Grant, MSN RN    Signed Prescriptions:                        Disp   Refills    nitroFURantoin macrocrystal-monohydrate (M*14 cap*0        Sig: Take 1 capsule (100 mg) by mouth 2 times daily  Authorizing Provider: MANJIT COLLINS  Ordering User: KRISTIN GRANT

## 2023-04-26 ENCOUNTER — PRE VISIT (OUTPATIENT)
Dept: UROLOGY | Facility: CLINIC | Age: 38
End: 2023-04-26
Payer: COMMERCIAL

## 2023-04-26 NOTE — TELEPHONE ENCOUNTER
Reason for visit: Cystoscopy; survey for bladder stones and mucus     Dx/Hx/Sx: Neurogenic bladder    Records/imaging/labs/orders: In EPIC    At Rooming: MONICA Kraus  04/26/23  4:23 PM

## 2023-05-01 ENCOUNTER — TELEPHONE (OUTPATIENT)
Dept: UROLOGY | Facility: CLINIC | Age: 38
End: 2023-05-01
Payer: COMMERCIAL

## 2023-05-01 DIAGNOSIS — R10.9 FLANK PAIN: ICD-10-CM

## 2023-05-01 DIAGNOSIS — R39.15 URGENCY OF URINATION: ICD-10-CM

## 2023-05-01 DIAGNOSIS — N39.0 UTI (URINARY TRACT INFECTION): Primary | ICD-10-CM

## 2023-05-01 NOTE — TELEPHONE ENCOUNTER
[facility] Call Center    Phone Message    May a detailed message be left on voicemail: yes     Reason for Call: Other: .  Pt calling stating he is experiencing some back pain, tesicular pain and hip pain. Pt is requesting to speak with a care team member. Please call pt.     Action Taken: Message routed to:  Other: Uro    Travel Screening: Not Applicable

## 2023-05-01 NOTE — TELEPHONE ENCOUNTER
Spoke to pt. Pt believes he has an infection with pain noted this morning. Noted lower right back and hip pain rated 3/10, intermittent. States it's been up and down. No current testicular pain, but did have it earlier that was up to 7/10. He has been irrigating and believes that's been helping. No fever. Urine is yellow and clear. No hematuria. Noted increased urgency. No concerns with catheterization. He states that he saved antibiotics from previous UTI treatment and took nitrofurantoin today. Discussed importance of completing treatment when prescribed antibiotics. Pt verbalized understanding.  Advised to increase water intake and irrigate BID. Advised to take tylenol and ibuprofen and use heating pad for pain relief. Pt will get UA/UC done tomorrow. Reviewed urgent symptoms of fever, retention, hematuria, and uncontrolled pain. Pt verbalized understanding.  He will call back clinic as needed.     Kristin Grant MSN RN    Orders Placed This Encounter   Procedures     Routine UA with microscopic - No culture     Standing Status:   Future     Standing Expiration Date:   5/1/2024     Urine Culture Aerobic Bacterial     Standing Status:   Future     Standing Expiration Date:   5/1/2024

## 2023-05-02 ENCOUNTER — LAB (OUTPATIENT)
Dept: LAB | Facility: CLINIC | Age: 38
End: 2023-05-02
Payer: COMMERCIAL

## 2023-05-02 DIAGNOSIS — R39.15 URGENCY OF URINATION: ICD-10-CM

## 2023-05-02 DIAGNOSIS — R10.9 FLANK PAIN: ICD-10-CM

## 2023-05-02 LAB
ALBUMIN UR-MCNC: 70 MG/DL
APPEARANCE UR: ABNORMAL
BACTERIA #/AREA URNS HPF: ABNORMAL /HPF
BILIRUB UR QL STRIP: NEGATIVE
COLOR UR AUTO: YELLOW
GLUCOSE UR STRIP-MCNC: NEGATIVE MG/DL
HGB UR QL STRIP: ABNORMAL
KETONES UR STRIP-MCNC: NEGATIVE MG/DL
LEUKOCYTE ESTERASE UR QL STRIP: ABNORMAL
MUCOUS THREADS #/AREA URNS LPF: PRESENT /LPF
NITRATE UR QL: NEGATIVE
PH UR STRIP: 7 [PH] (ref 5–7)
RBC URINE: 6 /HPF
SP GR UR STRIP: 1.01 (ref 1–1.03)
SQUAMOUS EPITHELIAL: <1 /HPF
TRANSITIONAL EPI: 1 /HPF
UROBILINOGEN UR STRIP-MCNC: NORMAL MG/DL
WBC CLUMPS #/AREA URNS HPF: PRESENT /HPF
WBC URINE: >182 /HPF

## 2023-05-02 PROCEDURE — 99000 SPECIMEN HANDLING OFFICE-LAB: CPT | Performed by: PATHOLOGY

## 2023-05-02 PROCEDURE — 87186 SC STD MICRODIL/AGAR DIL: CPT | Performed by: PATHOLOGY

## 2023-05-02 PROCEDURE — 87086 URINE CULTURE/COLONY COUNT: CPT | Performed by: PATHOLOGY

## 2023-05-02 PROCEDURE — 81001 URINALYSIS AUTO W/SCOPE: CPT | Performed by: PATHOLOGY

## 2023-05-03 LAB — BACTERIA UR CULT: ABNORMAL

## 2023-05-03 RX ORDER — NITROFURANTOIN 25; 75 MG/1; MG/1
100 CAPSULE ORAL 2 TIMES DAILY
Qty: 14 CAPSULE | Refills: 0 | Status: SHIPPED | OUTPATIENT
Start: 2023-05-03 | End: 2023-05-04 | Stop reason: ALTCHOICE

## 2023-05-03 NOTE — TELEPHONE ENCOUNTER
Ba Collins MD Bratsch, Angie J, RN  Thanks for catching that,   Let's do 7d NF 100mg BID     Thanks   CL     Spoke to pt. Informed him of results, but still waiting on sensitivities to finalize. Informed him that provider wants him to begin treatment. Order sent to preferred pharmacy. He understands that treatment may change based on finalized results. No other questions noted.     Kristin Grant, MSN RN    Signed Prescriptions:                        Disp   Refills    nitroFURantoin macrocrystal-monohydrate (M*14 cap*0        Sig: Take 1 capsule (100 mg) by mouth 2 times daily  Authorizing Provider: BA COLLINS  Ordering User: KRISTIN GRANT

## 2023-05-04 RX ORDER — CIPROFLOXACIN 500 MG/1
500 TABLET, FILM COATED ORAL 2 TIMES DAILY
Qty: 14 TABLET | Refills: 0 | Status: SHIPPED | OUTPATIENT
Start: 2023-05-04 | End: 2023-07-30

## 2023-05-04 NOTE — TELEPHONE ENCOUNTER
Spoke to pt. Informed pt of finalized culture and sensitivity results that warrant a change in treatment. Order of Cipro sent to pharmacy. Pt states he took nitrofurantoin yesterday and this morning. Advised pt to stop taking that and to start cipro tonight. Pt verbalized understanding.     Kristin Barton, MSN RN    Signed Prescriptions:                        Disp   Refills    ciprofloxacin (CIPRO) 500 MG tablet        14 tab*0        Sig: Take 1 tablet (500 mg) by mouth 2 times daily  Authorizing Provider: BA COLLINS  Ordering User: KRISTIN BARTON

## 2023-05-20 NOTE — PROGRESS NOTES
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 Has had bladder stones removed in past (2021, 2018 Dr. Ibrahim, 2015 percutaneous). Has chronic bilateral hydronephrosis.  He Caths per mitrofanoff stoma into augmented bladder with 14fr straight cath q3-4h    At last visit we saw dense mucus in the bladder so we instructed him to increase irrigation  Now irrigating a couple times per week  Gets an occasional UTI, symptoms include panting     Description of Procedure:    After informed consent was obtained, the patient was brought to the procedure room and placed in the supine position.  The catheterizable was prepped and draped in a sterile fashion.    There was no stenosis or erythema at the stomal site.     A 16F flexible cystoscope was introduced through the channel. This was done without resistance.   The channel showed  no evidence of stricture.   The bladder was free of tumors or stones.     The augment appeared to be healthy   There was some mild amount of mucus in the bladder which was irrigated out.   The bladder was clear at the end of irrigating     The cystoscope was removed and the findings were explained to the patient.    Assessment and Plan:  37yoM with exstrophy, neurogenic bladder managed with CIC through Mitrofanoff.     Recommended daily irrigation. It is necessary for him to receive 14fr catheters 6 per day for CIC and one 16fr catheter per day for irrigation.   RTC for cysto in 8 months to survey for bladder stones and mucus.       Manjit Nicolas MD  05/22/23

## 2023-05-22 ENCOUNTER — OFFICE VISIT (OUTPATIENT)
Dept: UROLOGY | Facility: CLINIC | Age: 38
End: 2023-05-22
Payer: COMMERCIAL

## 2023-05-22 VITALS
DIASTOLIC BLOOD PRESSURE: 88 MMHG | HEIGHT: 72 IN | SYSTOLIC BLOOD PRESSURE: 132 MMHG | HEART RATE: 93 BPM | BODY MASS INDEX: 40.63 KG/M2 | WEIGHT: 300 LBS

## 2023-05-22 DIAGNOSIS — N31.9 NEUROGENIC BLADDER: Primary | ICD-10-CM

## 2023-05-22 PROCEDURE — 52000 CYSTOURETHROSCOPY: CPT | Performed by: STUDENT IN AN ORGANIZED HEALTH CARE EDUCATION/TRAINING PROGRAM

## 2023-05-22 ASSESSMENT — PAIN SCALES - GENERAL: PAINLEVEL: NO PAIN (0)

## 2023-05-22 NOTE — PATIENT INSTRUCTIONS
"AFTER YOUR CYSTOSCOPY        You have just completed a cystoscopy, or \"cysto\", which allowed your physician to learn more about your bladder (or to remove a stent placed after surgery). We suggest that you continue to avoid caffeine, fruit juice, and alcohol for the next 24 hours, however, you are encouraged to return to your normal activities.         A few things that are considered normal after your cystoscopy:     * Small amount of bleeding (or spotting) that clears within the next 24 hours     * Slight burning sensation with urination     * Sensation of needing to void more frequently     * The feeling of \"air\" in your urine     * Mild discomfort that is relieved with Tylenol        Please contact our office promptly if you:     * Develop a fever above 101 degrees     * Are unable to urinate     * Develop bright red blood that does not stop     * Severe pain or swelling         Please contact our office with any concerns or questions @[phone]   AFTER YOUR CYSTOSCOPY        You have just completed a cystoscopy, or \"cysto\", which allowed your physician to learn more about your bladder (or to remove a stent placed after surgery). We suggest that you continue to avoid caffeine, fruit juice, and alcohol for the next 24 hours, however, you are encouraged to return to your normal activities.         A few things that are considered normal after your cystoscopy:     * Small amount of bleeding (or spotting) that clears within the next 24 hours     * Slight burning sensation with urination     * Sensation to of needing to avoid more frequently     * The feeling of \"air\" in your urine     * Mild discomfort that is relieved with Tylenol        Please contact our office promptly if you:     * Develop a fever above 101 degrees     * Are unable to urinate     * Develop bright red blood that does not stop     * Severe pain or swelling         Please contact our office with any concerns or questions @[facility].  "

## 2023-05-22 NOTE — LETTER
5/22/2023       RE: Olu Trinh  [address]  [facility] [address]     Dear Colleague,    Thank you for referring your patient, Olu Trinh, to the [facility] [place] at [facility]. Please see a copy of my visit note below.      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 Has had bladder stones removed in past (2021, 2018 Dr. Ibrahim, 2015 percutaneous). Has chronic bilateral hydronephrosis.  He Caths per mitrofanoff stoma into augmented bladder with 14fr straight cath q3-4h    At last visit we saw dense mucus in the bladder so we instructed him to increase irrigation  Now irrigating a couple times per week  Gets an occasional UTI, symptoms include panting     Description of Procedure:    After informed consent was obtained, the patient was brought to the procedure room and placed in the supine position.  The catheterizable was prepped and draped in a sterile fashion.    There was no stenosis or erythema at the stomal site.     A 16F flexible cystoscope was introduced through the channel. This was done without resistance.   The channel showed  no evidence of stricture.   The bladder was free of tumors or stones.     The augment appeared to be healthy   There was some mild amount of mucus in the bladder which was irrigated out.   The bladder was clear at the end of irrigating     The cystoscope was removed and the findings were explained to the patient.    Assessment and Plan:  37yoM with exstrophy, neurogenic bladder managed with CIC through Mitrofanoff.     Recommended daily irrigation. It is necessary for him to receive 14fr catheters 6 per day  for CIC and one 16fr catheter per day for irrigation.   RTC for cysto in 8 months to survey for bladder stones and mucus.       Manjit Nicolas MD  05/22/23

## 2023-06-09 ENCOUNTER — DOCUMENTATION ONLY (OUTPATIENT)
Dept: UROLOGY | Facility: CLINIC | Age: 38
End: 2023-06-09
Payer: COMMERCIAL

## 2023-06-09 NOTE — PROGRESS NOTES
Type of Form Received: Order (self-cath)    Form Received (Date) 6/9/23   Form Filled out Yes, date 6/9/23   Placed in provider folder Yes       Received Completed forms Yes   Faxed Forms Faxed To: Mickie   Fax Number: [phone]   Sent to HIM (Date) 6/26/23

## 2023-06-09 NOTE — PROGRESS NOTES
Type of Form Received: Order (speedicath)    Form Received (Date) 6/9/23   Form Filled out Yes, date 6/9/23   Placed in provider folder Yes       Received Completed forms Yes   Faxed Forms Faxed To: Mickie  Fax Number: [phone]   Sent to HIM (Date) 6/13/23

## 2023-06-12 ENCOUNTER — MEDICAL CORRESPONDENCE (OUTPATIENT)
Dept: HEALTH INFORMATION MANAGEMENT | Facility: CLINIC | Age: 38
End: 2023-06-12
Payer: COMMERCIAL

## 2023-06-25 DIAGNOSIS — N39.0 UTI (URINARY TRACT INFECTION): ICD-10-CM

## 2023-06-26 ENCOUNTER — MEDICAL CORRESPONDENCE (OUTPATIENT)
Dept: HEALTH INFORMATION MANAGEMENT | Facility: CLINIC | Age: 38
End: 2023-06-26
Payer: COMMERCIAL

## 2023-06-29 RX ORDER — CIPROFLOXACIN 500 MG/1
500 TABLET, FILM COATED ORAL 2 TIMES DAILY
Qty: 14 TABLET | OUTPATIENT
Start: 2023-06-29

## 2023-06-29 NOTE — TELEPHONE ENCOUNTER
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 Last clinic visit 5/22/23 for cystoscopy.  Plan, \"Recommended daily irrigation. It is necessary for him to receive 14fr catheters 6 per day for CIC and one 16fr catheter per day for irrigation.   RTC for cysto in 8 months to survey for bladder stones and mucus.\"    A: Writer contacted pharmacy to confirm pt requested medication refill as no notes in pt chart for concerns of a UTI. Writer contacted pt.  Pt stated he accidentally \"[facility] the wrong [facility]\" [facility]       R: Writer declined Rx and made note on Rx back to pharmacy.  Pt agreed with plan.    Elena Koehler RN  06/29/23  12:44 PM      "

## 2023-06-29 NOTE — TELEPHONE ENCOUNTER
ciprofloxacin (CIPRO) 500 MG tablet  Last Written Prescription Date:  5/4/23  Last Fill Quantity: 14,   # refills: 0  Last Office Visit : 5/22/23  Future Office visit:  none    Routing refill request to provider for review/approval because:  Drug not on the [facility], [facility] or [facility] refill protocol

## 2023-07-05 ENCOUNTER — DOCUMENTATION ONLY (OUTPATIENT)
Dept: UROLOGY | Facility: CLINIC | Age: 38
End: 2023-07-05
Payer: COMMERCIAL

## 2023-07-05 NOTE — PROGRESS NOTES
Type of Form Received: Order (lubricating jelly)    Form Received (Date) 7/5/23   Form Filled out Yes, date 7/5/23   Placed in provider folder Yes       Received Completed forms Yes   Faxed Forms Faxed To: Mickie  Fax Number: [phone]   Sent to HIM (Date) 7/12/23

## 2023-07-12 ENCOUNTER — MEDICAL CORRESPONDENCE (OUTPATIENT)
Dept: HEALTH INFORMATION MANAGEMENT | Facility: CLINIC | Age: 38
End: 2023-07-12
Payer: COMMERCIAL

## 2023-07-30 ENCOUNTER — HOSPITAL ENCOUNTER (INPATIENT)
Facility: CLINIC | Age: 38
LOS: 1 days | Discharge: HOME | End: 2023-07-31
Attending: EMERGENCY MEDICINE | Admitting: STUDENT IN AN ORGANIZED HEALTH CARE EDUCATION/TRAINING PROGRAM
Payer: COMMERCIAL

## 2023-07-30 ENCOUNTER — APPOINTMENT (OUTPATIENT)
Dept: CT IMAGING | Facility: CLINIC | Age: 38
End: 2023-07-30
Attending: EMERGENCY MEDICINE
Payer: COMMERCIAL

## 2023-07-30 DIAGNOSIS — N12 PYELONEPHRITIS: Primary | ICD-10-CM

## 2023-07-30 LAB
ALBUMIN SERPL BCG-MCNC: 4.1 G/DL (ref 3.5–5.2)
ALBUMIN UR-MCNC: 200 MG/DL
ALP SERPL-CCNC: 43 U/L (ref 40–129)
ALT SERPL W P-5'-P-CCNC: 27 U/L (ref 0–70)
AMORPH CRY #/AREA URNS HPF: ABNORMAL /HPF
ANION GAP SERPL CALCULATED.3IONS-SCNC: 14 MMOL/L (ref 7–15)
APPEARANCE UR: ABNORMAL
AST SERPL W P-5'-P-CCNC: 29 U/L (ref 0–45)
BACTERIA #/AREA URNS HPF: ABNORMAL /HPF
BASOPHILS # BLD AUTO: 0 10E3/UL (ref 0–0.2)
BASOPHILS NFR BLD AUTO: 0 %
BILIRUB SERPL-MCNC: 0.6 MG/DL
BILIRUB UR QL STRIP: NEGATIVE
BUN SERPL-MCNC: 22.5 MG/DL (ref 6–20)
CALCIUM SERPL-MCNC: 9.7 MG/DL (ref 8.6–10)
CHLORIDE SERPL-SCNC: 107 MMOL/L (ref 98–107)
COLOR UR AUTO: YELLOW
CREAT SERPL-MCNC: 1.45 MG/DL (ref 0.67–1.17)
DEPRECATED HCO3 PLAS-SCNC: 17 MMOL/L (ref 22–29)
EOSINOPHIL # BLD AUTO: 0 10E3/UL (ref 0–0.7)
EOSINOPHIL NFR BLD AUTO: 0 %
ERYTHROCYTE [DISTWIDTH] IN BLOOD BY AUTOMATED COUNT: 12.6 % (ref 10–15)
GFR SERPL CREATININE-BSD FRML MDRD: 63 ML/MIN/1.73M2
GLUCOSE SERPL-MCNC: 105 MG/DL (ref 70–99)
GLUCOSE UR STRIP-MCNC: NEGATIVE MG/DL
HCT VFR BLD AUTO: 46.5 % (ref 40–53)
HGB BLD-MCNC: 15.8 G/DL (ref 13.3–17.7)
HGB UR QL STRIP: ABNORMAL
HOLD SPECIMEN: NORMAL
HOLD SPECIMEN: NORMAL
IMM GRANULOCYTES # BLD: 0.1 10E3/UL
IMM GRANULOCYTES NFR BLD: 1 %
KETONES UR STRIP-MCNC: NEGATIVE MG/DL
LEUKOCYTE ESTERASE UR QL STRIP: ABNORMAL
LYMPHOCYTES # BLD AUTO: 0.3 10E3/UL (ref 0.8–5.3)
LYMPHOCYTES NFR BLD AUTO: 2 %
MCH RBC QN AUTO: 30 PG (ref 26.5–33)
MCHC RBC AUTO-ENTMCNC: 34 G/DL (ref 31.5–36.5)
MCV RBC AUTO: 88 FL (ref 78–100)
MONOCYTES # BLD AUTO: 0.3 10E3/UL (ref 0–1.3)
MONOCYTES NFR BLD AUTO: 3 %
MUCOUS THREADS #/AREA URNS LPF: PRESENT /LPF
NEUTROPHILS # BLD AUTO: 11.2 10E3/UL (ref 1.6–8.3)
NEUTROPHILS NFR BLD AUTO: 94 %
NITRATE UR QL: POSITIVE
NRBC # BLD AUTO: 0 10E3/UL
NRBC BLD AUTO-RTO: 0 /100
PH UR STRIP: 7 [PH] (ref 5–7)
PLATELET # BLD AUTO: 135 10E3/UL (ref 150–450)
POTASSIUM SERPL-SCNC: 3.2 MMOL/L (ref 3.4–5.3)
PROT SERPL-MCNC: 6.7 G/DL (ref 6.4–8.3)
RBC # BLD AUTO: 5.26 10E6/UL (ref 4.4–5.9)
RBC URINE: 41 /HPF
SODIUM SERPL-SCNC: 138 MMOL/L (ref 136–145)
SP GR UR STRIP: 1.01 (ref 1–1.03)
SQUAMOUS EPITHELIAL: 1 /HPF
TRANSITIONAL EPI: 1 /HPF
UROBILINOGEN UR STRIP-MCNC: NORMAL MG/DL
WBC # BLD AUTO: 11.9 10E3/UL (ref 4–11)
WBC CLUMPS #/AREA URNS HPF: PRESENT /HPF
WBC URINE: 72 /HPF

## 2023-07-30 PROCEDURE — 85025 COMPLETE CBC W/AUTO DIFF WBC: CPT | Performed by: EMERGENCY MEDICINE

## 2023-07-30 PROCEDURE — 120N000002 HC R&B MED SURG/OB UMMC

## 2023-07-30 PROCEDURE — 87086 URINE CULTURE/COLONY COUNT: CPT | Performed by: EMERGENCY MEDICINE

## 2023-07-30 PROCEDURE — 258N000003 HC RX IP 258 OP 636: Performed by: EMERGENCY MEDICINE

## 2023-07-30 PROCEDURE — 74176 CT ABD & PELVIS W/O CONTRAST: CPT | Mod: 26 | Performed by: RADIOLOGY

## 2023-07-30 PROCEDURE — 250N000011 HC RX IP 250 OP 636: Mod: JZ | Performed by: EMERGENCY MEDICINE

## 2023-07-30 PROCEDURE — 36415 COLL VENOUS BLD VENIPUNCTURE: CPT | Performed by: EMERGENCY MEDICINE

## 2023-07-30 PROCEDURE — 99222 1ST HOSP IP/OBS MODERATE 55: CPT | Performed by: STUDENT IN AN ORGANIZED HEALTH CARE EDUCATION/TRAINING PROGRAM

## 2023-07-30 PROCEDURE — 74176 CT ABD & PELVIS W/O CONTRAST: CPT

## 2023-07-30 PROCEDURE — 96365 THER/PROPH/DIAG IV INF INIT: CPT | Performed by: EMERGENCY MEDICINE

## 2023-07-30 PROCEDURE — 80053 COMPREHEN METABOLIC PANEL: CPT | Performed by: EMERGENCY MEDICINE

## 2023-07-30 PROCEDURE — 81001 URINALYSIS AUTO W/SCOPE: CPT | Performed by: EMERGENCY MEDICINE

## 2023-07-30 PROCEDURE — 96375 TX/PRO/DX INJ NEW DRUG ADDON: CPT | Performed by: EMERGENCY MEDICINE

## 2023-07-30 PROCEDURE — 250N000013 HC RX MED GY IP 250 OP 250 PS 637: Performed by: EMERGENCY MEDICINE

## 2023-07-30 PROCEDURE — 258N000003 HC RX IP 258 OP 636: Performed by: STUDENT IN AN ORGANIZED HEALTH CARE EDUCATION/TRAINING PROGRAM

## 2023-07-30 PROCEDURE — 99285 EMERGENCY DEPT VISIT HI MDM: CPT | Mod: 25 | Performed by: EMERGENCY MEDICINE

## 2023-07-30 PROCEDURE — 99285 EMERGENCY DEPT VISIT HI MDM: CPT | Performed by: EMERGENCY MEDICINE

## 2023-07-30 PROCEDURE — 250N000013 HC RX MED GY IP 250 OP 250 PS 637: Performed by: STUDENT IN AN ORGANIZED HEALTH CARE EDUCATION/TRAINING PROGRAM

## 2023-07-30 PROCEDURE — 96361 HYDRATE IV INFUSION ADD-ON: CPT | Performed by: EMERGENCY MEDICINE

## 2023-07-30 RX ORDER — PROCHLORPERAZINE MALEATE 10 MG
10 TABLET ORAL EVERY 6 HOURS PRN
Status: DISCONTINUED | OUTPATIENT
Start: 2023-07-30 | End: 2023-07-31 | Stop reason: HOSPADM

## 2023-07-30 RX ORDER — POTASSIUM CHLORIDE 1500 MG/1
40 TABLET, EXTENDED RELEASE ORAL ONCE
Status: COMPLETED | OUTPATIENT
Start: 2023-07-30 | End: 2023-07-30

## 2023-07-30 RX ORDER — ACETAMINOPHEN 325 MG/1
650 TABLET ORAL ONCE
Status: COMPLETED | OUTPATIENT
Start: 2023-07-30 | End: 2023-07-30

## 2023-07-30 RX ORDER — PROCHLORPERAZINE 25 MG
25 SUPPOSITORY, RECTAL RECTAL EVERY 12 HOURS PRN
Status: DISCONTINUED | OUTPATIENT
Start: 2023-07-30 | End: 2023-07-31 | Stop reason: HOSPADM

## 2023-07-30 RX ORDER — SODIUM CHLORIDE, SODIUM LACTATE, POTASSIUM CHLORIDE, CALCIUM CHLORIDE 600; 310; 30; 20 MG/100ML; MG/100ML; MG/100ML; MG/100ML
INJECTION, SOLUTION INTRAVENOUS CONTINUOUS
Status: DISCONTINUED | OUTPATIENT
Start: 2023-07-30 | End: 2023-07-31

## 2023-07-30 RX ORDER — POTASSIUM CHLORIDE 20MEQ/15ML
20 LIQUID (ML) ORAL ONCE
Status: COMPLETED | OUTPATIENT
Start: 2023-07-30 | End: 2023-07-30

## 2023-07-30 RX ORDER — ACETAMINOPHEN 325 MG/1
975 TABLET ORAL EVERY 8 HOURS
Status: DISCONTINUED | OUTPATIENT
Start: 2023-07-30 | End: 2023-07-31 | Stop reason: HOSPADM

## 2023-07-30 RX ORDER — CEFTRIAXONE 1 G/1
1 INJECTION, POWDER, FOR SOLUTION INTRAMUSCULAR; INTRAVENOUS EVERY 24 HOURS
Status: DISCONTINUED | OUTPATIENT
Start: 2023-07-31 | End: 2023-07-31 | Stop reason: HOSPADM

## 2023-07-30 RX ORDER — LIDOCAINE 40 MG/G
CREAM TOPICAL
Status: DISCONTINUED | OUTPATIENT
Start: 2023-07-30 | End: 2023-07-31 | Stop reason: HOSPADM

## 2023-07-30 RX ORDER — LEVETIRACETAM 750 MG/1
3000 TABLET, FILM COATED, EXTENDED RELEASE ORAL DAILY
Status: DISCONTINUED | OUTPATIENT
Start: 2023-07-31 | End: 2023-07-31 | Stop reason: HOSPADM

## 2023-07-30 RX ORDER — LORAZEPAM 2 MG/ML
2 INJECTION INTRAMUSCULAR EVERY 4 HOURS PRN
Status: DISCONTINUED | OUTPATIENT
Start: 2023-07-30 | End: 2023-07-31 | Stop reason: HOSPADM

## 2023-07-30 RX ORDER — KETOROLAC TROMETHAMINE 15 MG/ML
15 INJECTION, SOLUTION INTRAMUSCULAR; INTRAVENOUS ONCE
Status: COMPLETED | OUTPATIENT
Start: 2023-07-30 | End: 2023-07-30

## 2023-07-30 RX ORDER — CEFTRIAXONE 1 G/1
1 INJECTION, POWDER, FOR SOLUTION INTRAMUSCULAR; INTRAVENOUS ONCE
Status: COMPLETED | OUTPATIENT
Start: 2023-07-30 | End: 2023-07-30

## 2023-07-30 RX ADMIN — ACETAMINOPHEN 975 MG: 325 TABLET, FILM COATED ORAL at 22:39

## 2023-07-30 RX ADMIN — CEFTRIAXONE SODIUM 1 G: 1 INJECTION, POWDER, FOR SOLUTION INTRAMUSCULAR; INTRAVENOUS at 14:41

## 2023-07-30 RX ADMIN — SODIUM CHLORIDE 1000 ML: 9 INJECTION, SOLUTION INTRAVENOUS at 13:46

## 2023-07-30 RX ADMIN — POTASSIUM CHLORIDE 20 MEQ: 1.5 SOLUTION ORAL at 18:02

## 2023-07-30 RX ADMIN — POTASSIUM CHLORIDE 40 MEQ: 1500 TABLET, EXTENDED RELEASE ORAL at 22:39

## 2023-07-30 RX ADMIN — ACETAMINOPHEN 650 MG: 325 TABLET, FILM COATED ORAL at 13:36

## 2023-07-30 RX ADMIN — PROCHLORPERAZINE EDISYLATE 5 MG: 5 INJECTION INTRAMUSCULAR; INTRAVENOUS at 13:47

## 2023-07-30 RX ADMIN — KETOROLAC TROMETHAMINE 15 MG: 15 INJECTION, SOLUTION INTRAMUSCULAR; INTRAVENOUS at 13:48

## 2023-07-30 RX ADMIN — SODIUM CHLORIDE, POTASSIUM CHLORIDE, SODIUM LACTATE AND CALCIUM CHLORIDE: 600; 310; 30; 20 INJECTION, SOLUTION INTRAVENOUS at 19:53

## 2023-07-30 ASSESSMENT — ACTIVITIES OF DAILY LIVING (ADL)
ADLS_ACUITY_SCORE: 35
ADLS_ACUITY_SCORE: 33
ADLS_ACUITY_SCORE: 35

## 2023-07-30 NOTE — ED PROVIDER NOTES
ED Provider Note  [facility]      History     Chief Complaint   Patient presents with    Abdominal Pain    Dysuria     HPI  Olu Trinh is a 38 year old male who presenting with abdominal pain, dysuria. Patient reports developing lower abdominal pain, dysuria, low back pain more so on the right this morning. He reports that this feels similar to when he had UTIs in the past. He had one episode of vomiting this morning as well. No fevers.     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    Learning disability     Neurogenic bladder, NOS 10/30/2012    Obesity (BMI 35.0-39.9 without comorbidity)     Other hydronephrosis 06/02/2015    Unspecified epilepsy with intractable epilepsy 10/22/2013     Past Surgical History:   Procedure Laterality Date    BLADDER AUGMENTATION      bladder neck reconstruction      Mikey Glez    CYSTOSCOPY N/A 07/10/2018    Procedure: CYSTOSCOPY;  CYSTOSCOPY  OF NEOBLADDER  ;  Surgeon: Mario Ibrahim MD;  Location:  OR    CYSTOSCOPY VIA MITROFANOFF N/A 1/29/2021    Procedure: CYSTOSCOPY, VIA MITROFANOFF;  Surgeon: Joel Blunt MD;  [place] [facility] [place]    LASER HOLMIUM LITHOTRIPSY BLADDER N/A 07/24/2015    Procedure: LASER HOLMIUM LITHOTRIPSY BLADDER;  Surgeon: Joel Blunt MD;  Location: [facility] OR    LASER HOLMIUM LITHOTRIPSY BLADDER N/A 07/10/2018    Procedure: LASER HOLMIUM LITHOTRIPSY BLADDER;;  Surgeon: Mario Ibrahim MD;  Location:  OR    LEFT TEMPORAL CRANIOTOMY FOR ANTERIOR TEMPORAL LOBECTOMY  10/01/2018    MITROFANOFF PROCEDURE (APPENDIX CONDUIT)  01/01/2002    PERCUTANEOUS CYSTOLITHOTOMY N/A 9/9/2022    Procedure: CYSTOLITHOTOMY, WITH LASER STANDBY;  Surgeon: Manjit Nicolas MD;  [place] [facility] [place]    [facility] REMOVAL OF KIDNEY STONE       ciprofloxacin (CIPRO) 500 MG  tablet  Cranberry 500 MG CAPS  ibuprofen (ADVIL/MOTRIN) 200 MG capsule  levETIRAcetam (KEPPRA XR) 500 MG 24 hr tablet  LORazepam (ATIVAN) 1 MG tablet  nitroFURantoin macrocrystal-monohydrate (MACROBID) 100 MG capsule  OXcarbazepine ER 24hour (OXTELLAR XR) 600 MG 24 hr tablet  OXTELLAR  MG 24 hr tablet      Allergies   Allergen Reactions    Ondansetron      Other reaction(s): Hallucinations    Sulfa Antibiotics      unknown  Other reaction(s): Unknown     Family History  Family History   Problem Relation Age of Onset    Seizure Disorder Mother     Breast Cancer Mother     Cancer Father      Social History   Social History     Tobacco Use    Smoking status: Never    Smokeless tobacco: Never   Vaping Use    Vaping Use: Never used   Substance Use Topics    Alcohol use: Yes     Comment: Alcoholic beverage once or twice per week.    Drug use: No         A medically appropriate review of systems was performed with pertinent positives and negatives noted in the HPI, and all other systems negative.    Physical Exam   BP: 125/83  Pulse: 120  Temp: 98.4  F (36.9  C)  Resp: 18  SpO2: 97 %  Physical Exam  Constitutional:       General: He is not in acute distress.     Appearance: He is well-developed. He is not toxic-appearing.   HENT:      Head: Normocephalic and atraumatic.      Nose: Nose normal. No congestion.      Mouth/Throat:      Mouth: Mucous membranes are moist.      Pharynx: Oropharynx is clear.   Eyes:      Extraocular Movements: Extraocular movements intact.      Pupils: Pupils are equal, round, and reactive to light.   Cardiovascular:      Rate and Rhythm: Regular rhythm. Tachycardia present.   Pulmonary:      Effort: Pulmonary effort is normal. No respiratory distress.      Breath sounds: No wheezing or rales.   Abdominal:      General: There is no distension.      Palpations: Abdomen is soft.      Tenderness: There is abdominal tenderness. There is right CVA tenderness. There is no left CVA tenderness,  guarding or rebound.   Musculoskeletal:         General: No swelling or deformity. Normal range of motion.   Skin:     General: Skin is warm and dry.   Neurological:      General: No focal deficit present.      Mental Status: He is alert and oriented to person, place, and time.           ED Course, Procedures, & Data      Procedures                      Results for orders placed or performed during the hospital encounter of 07/30/23   CT Abdomen Pelvis w/o Contrast     Status: None (Preliminary result)    Narrative    EXAMINATION: CT ABDOMEN PELVIS W/O CONTRAST, 7/30/2023 2:44 PM    INDICATION: UTI, history of renal stones    COMPARISON STUDY: Abdomen 722, outside CT 9/11/2020 and 12/2/2020    TECHNIQUE: CT scan of the abdomen and pelvis was performed on  multidetector CT scanner using volumetric acquisition technique and  images were reconstructed in multiple planes with variable thickness  and reviewed on dedicated workstations.     CONTRAST: None     CT scan radiation dose is optimized to minimum requisite dose using  automated dose modulation techniques.    FINDINGS:    Lower thorax: Bibasilar dependent subsegmental atelectasis.    Liver: No mass. No intrahepatic biliary ductal dilation.    Biliary System: Normal gallbladder. No extrahepatic biliary ductal  dilation.    Pancreas: No mass or pancreatic ductal dilation.    Adrenal glands: No mass or nodules    Spleen: Normal. Splenule.    Kidneys: Bilateral hydronephrosis and hydroureters, worse in the  right. There is chronic enlargement of the right kidney with chronic  surrounding fat stranding, although the degree of fat stranding  appears substantially increased from 11/7/2022. Irregular cortex of  the left kidney with multiple areas of cortical thinning.  Nonobstructive bilateral nephrolithiasis. No suspicious renal masses.    Gastrointestinal tract :Post-operative changes of appendectomy. Normal  caliber small bowel.  Postoperative changes of small bowel  resection  with small bowel to small bowel anastomosis in the right lower  quadrant.    Mesentery/peritoneum/retroperitoneum: No mass. No free fluid or air.    Lymph nodes: Multiple sub centimeter para-aortic lymph nodes.    Vasculature: Patent major abdominal vasculature.    Pelvis: Postsurgical changes of bladder exstrophy repair with  Mitrofanoff procedure. There is a small amount of air in the  neobladder with a small dependent stone measuring approximately 12 x 8  mm. This is present on prior studies, although indeterminate if  unchanged versus recurrent stone.    Osseous structures: No aggressive or acute osseous lesion.      Soft tissues: Within normal limits.      Impression    IMPRESSION:   1. Substantially increased chronic right perinephric fat stranding  concerning for acute on chronic pyelonephritis.  2. Chronic bilateral hydronephrosis and hydroureters without evidence  of acute obstruction.  3. Postsurgical changes of bladder exstrophy repair with Mitrofanoff  procedure. Small bladder stone is similar to prior studies,  indeterminate if recurrent or unchanged bladder stone.   UA with Microscopic reflex to Culture     Status: Abnormal    Specimen: Urine, Midstream   Result Value Ref Range    Color Urine Yellow Colorless, Straw, Light Yellow, Yellow    Appearance Urine Slightly Cloudy (A) Clear    Glucose Urine Negative Negative mg/dL    Bilirubin Urine Negative Negative    Ketones Urine Negative Negative mg/dL    Specific Gravity Urine 1.011 1.003 - 1.035    Blood Urine Large (A) Negative    pH Urine 7.0 5.0 - 7.0    Protein Albumin Urine 200 (A) Negative mg/dL    Urobilinogen Urine Normal Normal, 2.0 mg/dL    Nitrite Urine Positive (A) Negative    Leukocyte Esterase Urine Large (A) Negative    Bacteria Urine Few (A) None Seen /HPF    WBC Clumps Urine Present (A) None Seen /HPF    Mucus Urine Present (A) None Seen /LPF    Amorphous Crystals Urine Few (A) None Seen /HPF    RBC Urine 41 (H) <=2 /HPF     WBC Urine 72 (H) <=5 /HPF    Squamous Epithelials Urine 1 <=1 /HPF    Transitional Epithelials Urine 1 <=1 /HPF    Narrative    Urine Culture ordered based on laboratory criteria   Comprehensive metabolic panel     Status: Abnormal   Result Value Ref Range    Sodium 138 136 - 145 mmol/L    Potassium 3.2 (L) 3.4 - 5.3 mmol/L    Chloride 107 98 - 107 mmol/L    Carbon Dioxide (CO2) 17 (L) 22 - 29 mmol/L    Anion Gap 14 7 - 15 mmol/L    Urea Nitrogen 22.5 (H) 6.0 - 20.0 mg/dL    Creatinine 1.45 (H) 0.67 - 1.17 mg/dL    Calcium 9.7 8.6 - 10.0 mg/dL    Glucose 105 (H) 70 - 99 mg/dL    Alkaline Phosphatase 43 40 - 129 U/L    AST 29 0 - 45 U/L    ALT 27 0 - 70 U/L    Protein Total 6.7 6.4 - 8.3 g/dL    Albumin 4.1 3.5 - 5.2 g/dL    Bilirubin Total 0.6 <=1.2 mg/dL    GFR Estimate 63 >60 mL/min/1.73m2   CBC with platelets and differential     Status: Abnormal   Result Value Ref Range    WBC Count 11.9 (H) 4.0 - 11.0 10e3/uL    RBC Count 5.26 4.40 - 5.90 10e6/uL    Hemoglobin 15.8 13.3 - 17.7 g/dL    Hematocrit 46.5 40.0 - 53.0 %    MCV 88 78 - 100 fL    MCH 30.0 26.5 - 33.0 pg    MCHC 34.0 31.5 - 36.5 g/dL    RDW 12.6 10.0 - 15.0 %    Platelet Count 135 (L) 150 - 450 10e3/uL    % Neutrophils 94 %    % Lymphocytes 2 %    % Monocytes 3 %    % Eosinophils 0 %    % Basophils 0 %    % Immature Granulocytes 1 %    NRBCs per 100 WBC 0 <1 /100    Absolute Neutrophils 11.2 (H) 1.6 - 8.3 10e3/uL    Absolute Lymphocytes 0.3 (L) 0.8 - 5.3 10e3/uL    Absolute Monocytes 0.3 0.0 - 1.3 10e3/uL    Absolute Eosinophils 0.0 0.0 - 0.7 10e3/uL    Absolute Basophils 0.0 0.0 - 0.2 10e3/uL    Absolute Immature Granulocytes 0.1 <=0.4 10e3/uL    Absolute NRBCs 0.0 10e3/uL   [place] Draw     Status: None    Narrative    The following orders were created for panel order [place] Draw.  Procedure                               Abnormality         Status                     ---------                               -----------         ------                      Extra Blue Top Tube[[id number]]                              Final result               Extra Red Top Tube[[id number]]                               Final result                 Please view results for these tests on the individual orders.   Extra Blue Top Tube     Status: None   Result Value Ref Range    Hold Specimen JIC    Extra Red Top Tube     Status: None   Result Value Ref Range    Hold Specimen JIC    CBC with platelets differential     Status: Abnormal    Narrative    The following orders were created for panel order CBC with platelets differential.  Procedure                               Abnormality         Status                     ---------                               -----------         ------                     CBC with platelets and d...[[id number]]  Abnormal            Final result                 Please view results for these tests on the individual orders.     Medications   0.9% sodium chloride BOLUS (0 mLs Intravenous Stopped 7/30/23 1516)   acetaminophen (TYLENOL) tablet 650 mg (650 mg Oral $Given 7/30/23 1336)   prochlorperazine (COMPAZINE) injection 5 mg (5 mg Intravenous $Given 7/30/23 1347)   ketorolac (TORADOL) injection 15 mg (15 mg Intravenous $Given 7/30/23 1348)   cefTRIAXone (ROCEPHIN) 1 g vial to attach to  mL bag for ADULTS or NS 50 mL bag for PEDS (0 g Intravenous Stopped 7/30/23 1516)     Labs Ordered and Resulted from Time of ED Arrival to Time of ED Departure   ROUTINE UA WITH MICROSCOPIC REFLEX TO CULTURE - Abnormal       Result Value    Color Urine Yellow      Appearance Urine Slightly Cloudy (*)     Glucose Urine Negative      Bilirubin Urine Negative      Ketones Urine Negative      Specific Gravity Urine 1.011      Blood Urine Large (*)     pH Urine 7.0      Protein Albumin Urine 200 (*)     Urobilinogen Urine Normal      Nitrite Urine Positive (*)     Leukocyte Esterase Urine Large (*)     Bacteria Urine Few (*)     WBC Clumps Urine Present (*)     Mucus Urine  Present (*)     Amorphous Crystals Urine Few (*)     RBC Urine 41 (*)     WBC Urine 72 (*)     Squamous Epithelials Urine 1      Transitional Epithelials Urine 1     COMPREHENSIVE METABOLIC PANEL - Abnormal    Sodium 138      Potassium 3.2 (*)     Chloride 107      Carbon Dioxide (CO2) 17 (*)     Anion Gap 14      Urea Nitrogen 22.5 (*)     Creatinine 1.45 (*)     Calcium 9.7      Glucose 105 (*)     Alkaline Phosphatase 43      AST 29      ALT 27      Protein Total 6.7      Albumin 4.1      Bilirubin Total 0.6      GFR Estimate 63     CBC WITH PLATELETS AND DIFFERENTIAL - Abnormal    WBC Count 11.9 (*)     RBC Count 5.26      Hemoglobin 15.8      Hematocrit 46.5      MCV 88      MCH 30.0      MCHC 34.0      RDW 12.6      Platelet Count 135 (*)     % Neutrophils 94      % Lymphocytes 2      % Monocytes 3      % Eosinophils 0      % Basophils 0      % Immature Granulocytes 1      NRBCs per 100 WBC 0      Absolute Neutrophils 11.2 (*)     Absolute Lymphocytes 0.3 (*)     Absolute Monocytes 0.3      Absolute Eosinophils 0.0      Absolute Basophils 0.0      Absolute Immature Granulocytes 0.1      Absolute NRBCs 0.0     URINE CULTURE     CT Abdomen Pelvis w/o Contrast   Preliminary Result   IMPRESSION:    1. Substantially increased chronic right perinephric fat stranding   concerning for acute on chronic pyelonephritis.   2. Chronic bilateral hydronephrosis and hydroureters without evidence   of acute obstruction.   3. Postsurgical changes of bladder exstrophy repair with Mitrofanoff   procedure. Small bladder stone is similar to prior studies,   indeterminate if recurrent or unchanged bladder stone.             Critical care was not performed.     Medical Decision Making  The patient's presentation was of high complexity (a chronic illness severe exacerbation, progression, or side effect of treatment).    The patient's evaluation involved:  ordering and/or review of 1 test(s) in this encounter (prior urine  cultures)  review of 3+ test result(s) ordered prior to this encounter (see separate area of note for details)  discussion of management or test interpretation with another health professional (hospitalist)    The patient's management necessitated high risk (a decision regarding hospitalization).    Assessment & Plan    30-year-old male with history of neurogenic bladder and chronic hydronephrosis presenting with dysuria, abdominal pain.  Patient was afebrile.  History exam were concerning for possible UTI or pyelonephritis.  Labs showed a elevated white blood cell count 11.9 as well as elevated creatinine 1.45.  UA was consistent with infection.    CT was obtained and suggestive of UTI and pyelonephritis.  Review of prior urine culture shows UTI's sensitive to most antibiotics and so patient was given Rocephin.  Patient was admitted for further management.    I have reviewed the nursing notes. I have reviewed the findings, diagnosis, plan and need for follow up with the patient.    New Prescriptions    No medications on file       Final diagnoses:   Pyelonephritis       Phillip Becerra  [facility] EMERGENCY DEPARTMENT  7/30/2023     Phillip Becerra MD  07/30/23 5330

## 2023-07-30 NOTE — ED TRIAGE NOTES
Pt comes in for pain with urination and concern for UTI. Pt having 8/10 abdominal pain with it and one episode of emesis. History of chronic UTIs     Triage Assessment       Row Name 07/30/23 1212       Triage Assessment (Adult)    Airway WDL WDL       Respiratory WDL    Respiratory WDL all;X    Rhythm/Pattern, Respiratory shortness of breath       Skin Circulation/Temperature WDL    Skin Circulation/Temperature WDL WDL       Cardiac WDL    Cardiac WDL X;all    Pulse Rate & Regularity tachycardic       Peripheral/Neurovascular WDL    Peripheral Neurovascular WDL WDL       Cognitive/Neuro/Behavioral WDL    Cognitive/Neuro/Behavioral WDL WDL

## 2023-07-30 NOTE — H&P
[facility]    History and Physical - Hospitalist Service, GOLD TEAM        Date of Admission:  7/30/2023    Assessment & Plan      Olu Trinh is a 38 year old male admitted on 7/30/2023. 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    #) Pyelonephritis  #) Neurogenic bladder secondary to bladder/cloacal exstrophy s/p mitrofanoff  #) Chronic urinary stones    Patient with repeated episodes of pyelonephritis and this is quite similar to previous episodes and UA quite suggestive, also with fat stranding on CT around right kidney.   No evidence of mechanical problem and cathing well through mitrofanoff.   Cultures from earlier this year with relatively sensitive E Coli except to ampicillin and notably to nitrofurantoin.  No evidence of sepsis at present.    - admit to inpatient  - continue ceftriaxone  - follow urine culture  - APAP, hold ibuprofen given acute kidney injury   - urology consult, probably no intervention needed  - would not use nitrofurantoin empirically in the future    #) Acute kidney injury     - most likely in the setting of illness, suspect pre-renal  - LR @ 100 ml/h, recheck in AM    #) Epilepsy: has been very well controlled for the last year    - continue home keppra and oxcarbazepine  - seizure precautions  - lorazepam 2 mg IV for seizure          Diet: Combination Diet Regular Diet Adult  DVT Prophylaxis: Pneumatic Compression Devices  Shi Catheter: Not present  Lines: None     Cardiac Monitoring: None  Code Status: Full Code    Clinically Significant Risk Factors Present on Admission        # Hypokalemia: Lowest K = 3.2 mmol/L in last 2 days, will replace as needed                         Disposition Plan     "  Expected Discharge Date: 07/31/2023                Gerber Adames MD  Hospitalist Service, [facility]  Securely message with [employer] (more info)  Text page via [employer] Paging/Directory   See signed in provider for up to date coverage information    ______________________________________________________________________    Chief Complaint   Flank pain and cloudy urine    History is obtained from the patient    History of Present Illness     Olu Trinh is a 38 year old male admitted on 7/30/2023. 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  Patient was in his usual state of health until 2-3 days ago when he noted \"pulp\" in his urine along with fatigue and chills; symptoms progressed and he had vomiting this morning in keeping with previous episodes of pyelonephritis and thus he came to the [facility].    He usually caths every few hours and again in the middle of the night. No issues with catheterization and no other new symptoms.       Past Medical History    Past Medical History:   Diagnosis Date    Attention deficit disorder with hyperactivity(314.01)     Bladder stone 06/02/2015    Exstrophy of bladder sequence 06/29/2015    Learning disability     Neurogenic bladder, NOS 10/30/2012    Obesity (BMI 35.0-39.9 without comorbidity)     Other hydronephrosis 06/02/2015    Unspecified epilepsy with intractable epilepsy 10/22/2013       Past Surgical History   Past Surgical History:   Procedure Laterality Date    BLADDER AUGMENTATION      bladder neck reconstruction      Mikey Glez    CYSTOSCOPY N/A 07/10/2018    Procedure: CYSTOSCOPY;  CYSTOSCOPY  OF NEOBLADDER  ;  Surgeon: Mario Ibrahim MD;  Location: [facility] OR    CYSTOSCOPY VIA " MITROFANOFF N/A 2021    Procedure: CYSTOSCOPY, VIA MITROFANOFF;  Surgeon: Joel Blunt MD;  Location: [facility] OR    LASER HOLMIUM LITHOTRIPSY BLADDER N/A 2015    Procedure: LASER HOLMIUM LITHOTRIPSY BLADDER;  Surgeon: Joel Blunt MD;  Location: [facility] OR    LASER HOLMIUM LITHOTRIPSY BLADDER N/A 07/10/2018    Procedure: LASER HOLMIUM LITHOTRIPSY BLADDER;;  Surgeon: Mario Ibrahim MD;  Location: [facility] OR    LEFT TEMPORAL CRANIOTOMY FOR ANTERIOR TEMPORAL LOBECTOMY  10/01/2018    MITROFANOFF PROCEDURE (APPENDIX CONDUIT)  2002    PERCUTANEOUS CYSTOLITHOTOMY N/A 2022    Procedure: CYSTOLITHOTOMY, WITH LASER STANDBY;  Surgeon: Manjit Nicolas MD;  Location: [facility] OR    [facility] REMOVAL OF KIDNEY STONE         Prior to Admission Medications   Prior to Admission Medications   Prescriptions Last Dose Informant Patient Reported? Taking?   Cranberry 500 MG CAPS 2023 Self Yes Yes   Sig: Take 750 mg by mouth   OXcarbazepine ER 24hour (OXTELLAR XR) 600 MG 24 hr tablet 2023  Yes Yes   Si.5 tablet (900 mg)   ibuprofen (ADVIL/MOTRIN) 200 MG capsule Past Month Self Yes Yes   Sig: Take 200 mg by mouth every 4 hours as needed for fever   levETIRAcetam (KEPPRA XR) 500 MG 24 hr tablet 2023  Yes Yes   Sig: Take 3,000 mg by mouth      Facility-Administered Medications: None        Review of Systems    The 10 point Review of Systems is negative other than noted in the HPI or here.      Physical Exam   Vital Signs: Temp: 97.7  F (36.5  C) Temp src: Oral BP: 115/73 Pulse: 83   Resp: 18 SpO2: 94 % O2 Device: None (Room air)    Weight: 0 lbs 0 oz    General Appearance: Tired, non-toxic, conversational  Respiratory: clear to auscultation bilaterally with good air entry   Cardiovascular: normal rate, regular rhythm, no murmurs, rubs, or gallops.   GI: mitrofanoff in RLQ.  Right CVA tenderness to palpation.  Abdomen soft, non-tender, non-distended.   Skin: venous stasis changes  bilaterally  Other: 2+ right worse than left non-pitting lower extremity edema.      Medical Decision Making       NOTE(S)/MEDICAL RECORDS REVIEWED over the past 24 hours: Previous encounter notes.        Data     I have personally reviewed the following data over the past 24 hrs:    11.9 (H)  \   15.8   / 135 (L)     138 107 22.5 (H) /  105 (H)   3.2 (L) 17 (L) 1.45 (H) \     ALT: 27 AST: 29 AP: 43 TBILI: 0.6   ALB: 4.1 TOT PROTEIN: 6.7 LIPASE: N/A

## 2023-07-30 NOTE — LETTER
[facility] [address]  [address]  [facility] [address]  [phone]      2023    Olu Trinh  [address] [facility] [id number] [phone] (home)     : 1985      To Whom it may concern:    Olu Trinh was seen in our hospital today, 2023. He is able to return to work on 23    Sincerely,    Rambo Denton MD

## 2023-07-31 VITALS
OXYGEN SATURATION: 95 % | HEART RATE: 87 BPM | HEIGHT: 72 IN | RESPIRATION RATE: 16 BRPM | TEMPERATURE: 97.3 F | WEIGHT: 300 LBS | SYSTOLIC BLOOD PRESSURE: 139 MMHG | DIASTOLIC BLOOD PRESSURE: 92 MMHG | BODY MASS INDEX: 40.63 KG/M2

## 2023-07-31 LAB
ANION GAP SERPL CALCULATED.3IONS-SCNC: 10 MMOL/L (ref 7–15)
BACTERIA UR CULT: NORMAL
BUN SERPL-MCNC: 24.4 MG/DL (ref 6–20)
CALCIUM SERPL-MCNC: 9 MG/DL (ref 8.6–10)
CHLORIDE SERPL-SCNC: 106 MMOL/L (ref 98–107)
CREAT SERPL-MCNC: 1.36 MG/DL (ref 0.67–1.17)
DEPRECATED HCO3 PLAS-SCNC: 21 MMOL/L (ref 22–29)
ERYTHROCYTE [DISTWIDTH] IN BLOOD BY AUTOMATED COUNT: 13.1 % (ref 10–15)
GFR SERPL CREATININE-BSD FRML MDRD: 68 ML/MIN/1.73M2
GLUCOSE SERPL-MCNC: 113 MG/DL (ref 70–99)
HCT VFR BLD AUTO: 44 % (ref 40–53)
HGB BLD-MCNC: 14.8 G/DL (ref 13.3–17.7)
MCH RBC QN AUTO: 30.6 PG (ref 26.5–33)
MCHC RBC AUTO-ENTMCNC: 33.6 G/DL (ref 31.5–36.5)
MCV RBC AUTO: 91 FL (ref 78–100)
PLATELET # BLD AUTO: 140 10E3/UL (ref 150–450)
POTASSIUM SERPL-SCNC: 4.4 MMOL/L (ref 3.4–5.3)
POTASSIUM SERPL-SCNC: 4.4 MMOL/L (ref 3.4–5.3)
RBC # BLD AUTO: 4.84 10E6/UL (ref 4.4–5.9)
SODIUM SERPL-SCNC: 137 MMOL/L (ref 136–145)
WBC # BLD AUTO: 16.3 10E3/UL (ref 4–11)

## 2023-07-31 PROCEDURE — 258N000003 HC RX IP 258 OP 636

## 2023-07-31 PROCEDURE — 258N000003 HC RX IP 258 OP 636: Performed by: STUDENT IN AN ORGANIZED HEALTH CARE EDUCATION/TRAINING PROGRAM

## 2023-07-31 PROCEDURE — 36415 COLL VENOUS BLD VENIPUNCTURE: CPT | Performed by: PHYSICIAN ASSISTANT

## 2023-07-31 PROCEDURE — 99207 PR APP CREDIT; MD BILLING SHARED VISIT: CPT | Performed by: INTERNAL MEDICINE

## 2023-07-31 PROCEDURE — 80048 BASIC METABOLIC PNL TOTAL CA: CPT | Performed by: STUDENT IN AN ORGANIZED HEALTH CARE EDUCATION/TRAINING PROGRAM

## 2023-07-31 PROCEDURE — 250N000011 HC RX IP 250 OP 636: Mod: JZ | Performed by: STUDENT IN AN ORGANIZED HEALTH CARE EDUCATION/TRAINING PROGRAM

## 2023-07-31 PROCEDURE — 85027 COMPLETE CBC AUTOMATED: CPT | Performed by: PHYSICIAN ASSISTANT

## 2023-07-31 PROCEDURE — 99239 HOSP IP/OBS DSCHRG MGMT >30: CPT | Performed by: INTERNAL MEDICINE

## 2023-07-31 PROCEDURE — 250N000013 HC RX MED GY IP 250 OP 250 PS 637: Performed by: STUDENT IN AN ORGANIZED HEALTH CARE EDUCATION/TRAINING PROGRAM

## 2023-07-31 RX ORDER — SODIUM CHLORIDE 9 MG/ML
INJECTION, SOLUTION INTRAVENOUS
Status: COMPLETED
Start: 2023-07-31 | End: 2023-07-31

## 2023-07-31 RX ORDER — CIPROFLOXACIN 500 MG/1
500 TABLET, FILM COATED ORAL 2 TIMES DAILY
Qty: 14 TABLET | Refills: 0 | Status: SHIPPED | OUTPATIENT
Start: 2023-07-31 | End: 2023-08-07

## 2023-07-31 RX ADMIN — LEVETIRACETAM 3000 MG: 750 TABLET, FILM COATED, EXTENDED RELEASE ORAL at 08:56

## 2023-07-31 RX ADMIN — OXCARBAZEPINE 900 MG: 300 TABLET ORAL at 08:56

## 2023-07-31 RX ADMIN — ACETAMINOPHEN 975 MG: 325 TABLET, FILM COATED ORAL at 14:08

## 2023-07-31 RX ADMIN — SODIUM CHLORIDE 500 ML: 9 INJECTION, SOLUTION INTRAVENOUS at 14:37

## 2023-07-31 RX ADMIN — SODIUM CHLORIDE, POTASSIUM CHLORIDE, SODIUM LACTATE AND CALCIUM CHLORIDE: 600; 310; 30; 20 INJECTION, SOLUTION INTRAVENOUS at 06:02

## 2023-07-31 RX ADMIN — CEFTRIAXONE SODIUM 1 G: 1 INJECTION, POWDER, FOR SOLUTION INTRAMUSCULAR; INTRAVENOUS at 14:37

## 2023-07-31 RX ADMIN — ACETAMINOPHEN 975 MG: 325 TABLET, FILM COATED ORAL at 05:52

## 2023-07-31 ASSESSMENT — ACTIVITIES OF DAILY LIVING (ADL)
ADLS_ACUITY_SCORE: 35

## 2023-07-31 NOTE — PLAN OF CARE
Goal Outcome Evaluation:     Shift: 0700 - 1530    Vital Signs: Temp: 97.5  F (36.4  C) Temp src: Oral BP: 138/89 Pulse: 66   Resp: 18 SpO2: 95 % O2 Device: None (Room air)      CMS/Neuro: A&O x4 - calm & cooperative w/ cares.  Denies headache, dizziness, N/V   Cardio/Resp: No SOB and no chest pain.  No wheezing or crackles - Lung sounds clear bilaterally    Last BM: LBM: 07/31/23  BS active & audible x4. Passing flatus, denies abdominal pain   Output: Voids spontaneously & adequate amounts  Self straight caths   Activity: Independent    Skin: Intact - No rashes, abrasions, or lesions   Pain: Controlled w/ scheduled tylenol   Dressing: None   LDA: R arm PIV - Infusing   Diet: Regular, Thin liquids, Good appetite, Meds whole   Additional Info: Call light w/in reach and able to make needs known.  New ABX started - Infusing now   Plan: Continue POC - Discharge tonight after abx administration.

## 2023-07-31 NOTE — DISCHARGE SUMMARY
6MS DISCHARGE    D: Patient discharged to home at 1745. Patient accompanied by transportation staff.  I: Discharge prescriptions given to patient. All discharge medications and instructions reviewed with Olu. Patient instructed to seek care if experiencing worsening symptoms, such as Urinary tract abnormalities. Other phone numbers to call with questions or concerns after discharge reviewed. PIV  removed. Education completed.  A: Patient verbalized understanding of discharge medications and instructions. Prescribed home medications given to patient.  Belongings returned to patient.  P: Patient to follow-up his primary care provider within 7 days

## 2023-07-31 NOTE — PHARMACY-ADMISSION MEDICATION HISTORY
Pharmacist Admission Medication History    Admission medication history is complete. The information provided in this note is only as accurate as the sources available at the time of the update.    Medication reconciliation/reorder completed by provider prior to medication history? Yes    Information Source(s): Patient via phone    Pertinent Information: Patient confirms dosing of anti-epileptics which match fill hx     Changes made to PTA medication list:  Added: None  Deleted: None  Changed: None    Medication Affordability:       Allergies reviewed with patient and updates made in EHR: yes    Medication History Completed By: RAHEEL SAEED RPH 7/30/2023 7:43 PM    Prior to Admission medications    Medication Sig Last Dose Taking? Auth Provider Long Term End Date   Cranberry 500 MG CAPS Take 750 mg by mouth 7/30/2023 Yes Reported, Patient     ibuprofen (ADVIL/MOTRIN) 200 MG capsule Take 200 mg by mouth every 4 hours as needed for fever Past Month Yes Reported, Patient     levETIRAcetam (KEPPRA XR) 500 MG 24 hr tablet Take 3,000 mg by mouth 7/30/2023 Yes Reported, Patient Yes    OXcarbazepine ER 24hour (OXTELLAR XR) 600 MG 24 hr tablet 1.5 tablet (900 mg) 7/30/2023 Yes Reported, Patient Yes

## 2023-07-31 NOTE — PROGRESS NOTES
"Brief Medicine Cross-Cover Note:    Patient arrived from [place] to [facility]. Vitals stable. Reporting pain unchanged. Currently with orders for tylenol; if severe pain consider add Norco or low dose oxycodone (oxycodone has made him feel \"icky\" in the past).    Care assumed for this patient. Please refer to H&P for details.    Roberto Steward PA-C on 7/30/2023 at 7:56 PM      "

## 2023-07-31 NOTE — DISCHARGE SUMMARY
[facility]  Hospitalist Discharge Summary      Date of Admission:  7/30/2023  Date of Discharge:  7/31/2023  Discharging Provider: Rambo Denton MD  Discharge Service: Hospitalist Service, GOLD TEAM 22    Discharge Diagnoses   Pyelonephritis  Seizure disorder  Acute kidney injury  Epilepsy    Clinically Significant Risk Factors     # Severe Obesity: Estimated body mass index is 40.69 kg/m  as calculated from the following:    Height as of this encounter: 1.829 m (6').    Weight as of this encounter: 136.1 kg (300 lb).       Follow-ups Needed After Discharge   Follow-up Appointments     Adult [facility] Follow-up and recommended labs and tests      Follow up with primary care provider, Álvaro Drew, within 7-14 days for   hospital follow- up.  No follow up labs or test are needed.      Appointments on [place] and/or [facility] (with [facility] or [facility]   provider or service). Call [phone] if you haven't heard regarding   these appointments within 7 days of discharge.            Unresulted Labs Ordered in the Past 30 Days of this Admission       No orders found for last 31 day(s).        These results will be followed up by NA    Discharge Disposition   Discharged to home  Condition at discharge: Stable    Hospital Course   Olu Trinh is a 38 year old male admitted on 7/30/2023. 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    Acute Pyelonephritis  Neurogenic bladder secondary to bladder/cloacal exstrophy s/p mitrofanoff  Chronic urinary stones  - initially treated with ceftriaxone, discharge on 7 days of ciprofloxacin  - urine culture with mixed dea, will choose empiric antibiotics based on prior cultures  - APAP for pain  -  would not use nitrofurantoin empirically in the future  - with rising white blood cell count I would recommend close PCP follow up    Acute kidney injury   - most likely in the setting of illness, suspect pre-renal  - monitor in the outpatient setting  - encourage hydration    Epilepsy: has been very well controlled for the last year  - continue home keppra and oxcarbazepine    Consultations This Hospital Stay   UROLOGY IP CONSULT    Code Status   Full Code    Time Spent on this Encounter   I, Rambo Denton MD, personally saw the patient today and spent 40 minutes discharging this patient.       Rambo Denton MD  [facility] [address]  [facility] [address]  Phone: [phone]  Fax: [phone]  ______________________________________________________________________    Physical Exam   Vital Signs: Temp: 97.5  F (36.4  C) Temp src: Oral BP: 138/89 Pulse: 66   Resp: 18 SpO2: 95 % O2 Device: None (Room air)    Weight: 300 lbs 0 oz  Gen: NAD, sitting comfortably in bed  Eyes: EOMI, conjuctiva clear  CV: RRR, no murmurs, 2+ radial pulses  RESP: CTA bilaterally, no w/r/c  Abd: soft, nontender, nondistended  Ext: no edema bilaterally       Primary Care Physician   Álvaro Drew    Discharge Orders      Activity    Your activity upon discharge: activity as tolerated     Reason for your hospital stay    You were admitted for a kidney infection, pyelonephritis. Unfortunately the urine culture didn't grow one specific bacteria. So together you and I decided to pick an option that would be good for a general treatment of kidney infection. You should follow up with your doctor to make sure things are improving.     Adult [facility] Follow-up and recommended labs and tests    Follow up with primary care provider, Álvaro Drew, within 7-14 days for hospital follow- up.  No follow up labs or test are needed.      Appointments on [place] and/or [facility] (with [facility] or [facility] provider or service). Call  [phone] if you haven't heard regarding these appointments within 7 days of discharge.     Diet    Follow this diet upon discharge: Orders Placed This Encounter      Combination Diet Regular Diet Adult       Significant Results and Procedures   Most Recent 3 CBC's:  Recent Labs   Lab Test 07/31/23  0409 07/30/23  1350 11/14/22  1559   WBC 16.3* 11.9* 7.8   HGB 14.8 15.8 15.3   MCV 91 88 88   * 135* 252     Most Recent 3 BMP's:  Recent Labs   Lab Test 07/31/23  0409 07/30/23  1350 11/14/22  1559    138 141   POTASSIUM 4.4  4.4 3.2* 5.1   CHLORIDE 106 107 107   CO2 21* 17* 24   BUN 24.4* 22.5* 20.8*   CR 1.36* 1.45* 1.16   ANIONGAP 10 14 10   MIO 9.0 9.7 10.4*   * 105* 95     Most Recent 2 LFT's:  Recent Labs   Lab Test 07/30/23  1350 11/07/22 2004   AST 29 28   ALT 27 24   ALKPHOS 43 44   BILITOTAL 0.6 0.6     Most Recent 3 INR's:No lab results found.,   Results for orders placed or performed during the hospital encounter of 07/30/23   CT Abdomen Pelvis w/o Contrast    Narrative    EXAMINATION: CT ABDOMEN PELVIS W/O CONTRAST, 7/30/2023 2:44 PM    INDICATION: UTI, history of renal stones    COMPARISON STUDY: Abdomen 722, outside CT 9/11/2020 and 12/2/2020    TECHNIQUE: CT scan of the abdomen and pelvis was performed on  multidetector CT scanner using volumetric acquisition technique and  images were reconstructed in multiple planes with variable thickness  and reviewed on dedicated workstations.     CONTRAST: None     CT scan radiation dose is optimized to minimum requisite dose using  automated dose modulation techniques.    FINDINGS:    Lower thorax: Bibasilar dependent subsegmental atelectasis.    Liver: No mass. No intrahepatic biliary ductal dilation.    Biliary System: Normal gallbladder. No extrahepatic biliary ductal  dilation.    Pancreas: No mass or pancreatic ductal dilation.    Adrenal glands: No mass or nodules    Spleen: Normal. Splenule.    Kidneys: Bilateral hydronephrosis and  hydroureters, worse in the  right. There is chronic enlargement of the right kidney with chronic  surrounding fat stranding, although the degree of fat stranding  appears substantially increased from 11/7/2022. Irregular cortex of  the left kidney with multiple areas of cortical thinning.  Nonobstructive bilateral nephrolithiasis. No suspicious renal masses.    Gastrointestinal tract :Post-operative changes of appendectomy. Normal  caliber small bowel.  Postoperative changes of small bowel resection  with small bowel to small bowel anastomosis in the right lower  quadrant.    Mesentery/peritoneum/retroperitoneum: No mass. No free fluid or air.    Lymph nodes: Multiple sub centimeter para-aortic lymph nodes.    Vasculature: Patent major abdominal vasculature.    Pelvis: Postsurgical changes of bladder exstrophy repair with  Mitrofanoff procedure and right lower quadrant urinostomy. There is a  small amount of air in the neobladder with a small dependent stone  measuring approximately 12 x 8 mm. This is present on prior studies,  although indeterminate if unchanged versus recurrent stone.    Osseous structures: No aggressive or acute osseous lesion.      Soft tissues: Within normal limits.      Impression    IMPRESSION:   1. Substantially increased chronic right perinephric fat stranding  concerning for acute on chronic pyelonephritis.  2. Chronic bilateral hydronephrosis and hydroureters without evidence  of acute obstruction.  3. Postsurgical changes of bladder exstrophy repair with Mitrofanoff  procedure. Small bladder stone is similar to prior studies,  indeterminate if recurrent or unchanged bladder stone.    I have personally reviewed the examination and initial interpretation  and I agree with the findings.    RAMÓN CONTRERAS MD         SYSTEM ID:  [id number]       Discharge Medications   Current Discharge Medication List        START taking these medications    Details   ciprofloxacin (CIPRO) 500 MG tablet Take 1  tablet (500 mg) by mouth 2 times daily for 7 days  Qty: 14 tablet, Refills: 0    Associated Diagnoses: Pyelonephritis           CONTINUE these medications which have NOT CHANGED    Details   Cranberry 500 MG CAPS Take 750 mg by mouth      levETIRAcetam (KEPPRA XR) 500 MG 24 hr tablet Take 3,000 mg by mouth      OXcarbazepine ER 24hour (OXTELLAR XR) 600 MG 24 hr tablet 1.5 tablet (900 mg)           STOP taking these medications       ibuprofen (ADVIL/MOTRIN) 200 MG capsule Comments:   Reason for Stopping:             Allergies   Allergies   Allergen Reactions    Ondansetron      Other reaction(s): Hallucinations    Sulfa Antibiotics      unknown  Other reaction(s): Unknown

## 2023-07-31 NOTE — PROGRESS NOTES
VS: /84 (BP Location: Right arm, Patient Position: Sitting, Cuff Size: Adult Regular)   Pulse 78   Temp 98  F (36.7  C) (Oral)   Resp 17   Ht 1.829 m (6')   Wt 136.1 kg (300 lb)   SpO2 94%   BMI 40.69 kg/m       O2: Sating >90% on RA.  Denies chest pain and SOB.   Output: Voids spontaneously and adequately.  IND in room    Last BM: LBM 7/31   Activity: IND in room    Skin: Visible skin intact    Pain: Pain was managed with scheduled Tylenol    CMS: A&Ox4.    Dressing: N/A   Diet: Regular.Diet thin liquids    LDA: PIV to R arm is infusing  ml/hr    Equipment: IV pole, FWW, gait belt, and personal belongings. Call light within reach and uses appropriately.   Plan:  TBD    Additional Info: Pt. Slept for most of the night. No acute changes this shift. Continue POC.

## 2023-07-31 NOTE — PROGRESS NOTES
1930- 2300 hrs  Major Shift Events: Admitted earlier by outgoing shift from [place], patient  baseline self cath  - hypokalemic , replaced K  Plan/ Intervention: pain control                              : lactated ringers started                               : comfort and safety                              : ordered from [facility]  F14 self cath however [facility] stated they only have  F12 catheter, ordered F12                               : K standard protocol  For vital signs and complete assessments, please see documentation flowsheets.

## 2023-07-31 NOTE — CONSULTS
"Urology Consult    Name: Olu Trinh    MRN: [id number]   YOB: 1985               Chief Complaint:   Right pyelonephritis     History is obtained from the patient and chart review          History of Present Illness:   \"Olu Trinh is a 38 year old male admitted on 7/30/2023. 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    Mr. Trinh has seen Dr. Blunt and most recently Dr. Nicolas in the past. He has a history of recurrent UTIs and has had to have bladder stones surgically removed previously. He most recently was seen and was found to have a significant amount of mucus on cystscopy. He was started on a regular irrigation regimen in addition to his CIC regimen. It is planned that he have f/u repeat cystoscopy to re-evaluate the bladder in a few months although this is not scheduled.    Mr. Trinh states that he has been performing CIC about 6x/day and 1-2x/night. He irrigates only a few times a month because he does not remember to. He does get some mucus out when he irrigates.     Last week he noticed some difficulty irrigating, and he went into retention for a period of time, and this was immediately prior to him developing flank pain. He was able to ultimately irrigate out some mucus, but he thinks this is why he developed the UTI.    Currently he denies fevers, chills. He has persistent flank pain on the right but this is improved. He has had no problems performing catheterization in-house or irrigating.           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    " Learning disability     Neurogenic bladder, NOS 10/30/2012    Obesity (BMI 35.0-39.9 without comorbidity)     Other hydronephrosis 06/02/2015    Unspecified epilepsy with intractable epilepsy 10/22/2013            Past Surgical History:     Past Surgical History:   Procedure Laterality Date    BLADDER AUGMENTATION      bladder neck reconstruction      Mikey lGez    CYSTOSCOPY N/A 07/10/2018    Procedure: CYSTOSCOPY;  CYSTOSCOPY  OF NEOBLADDER  ;  Surgeon: Mario Ibrahim MD;  Location: [facility] OR    CYSTOSCOPY VIA MITROFANOFF N/A 1/29/2021    Procedure: CYSTOSCOPY, VIA MITROFANOFF;  Surgeon: Joel Blunt MD;  Location: [facility] OR    LASER HOLMIUM LITHOTRIPSY BLADDER N/A 07/24/2015    Procedure: LASER HOLMIUM LITHOTRIPSY BLADDER;  Surgeon: Joel Blunt MD;  Location: [facility] OR    LASER HOLMIUM LITHOTRIPSY BLADDER N/A 07/10/2018    Procedure: LASER HOLMIUM LITHOTRIPSY BLADDER;;  Surgeon: Mario Ibrahim MD;  Location:  OR    LEFT TEMPORAL CRANIOTOMY FOR ANTERIOR TEMPORAL LOBECTOMY  10/01/2018    MITROFANOFF PROCEDURE (APPENDIX CONDUIT)  01/01/2002    PERCUTANEOUS CYSTOLITHOTOMY N/A 9/9/2022    Procedure: CYSTOLITHOTOMY, WITH LASER STANDBY;  Surgeon: Manjit Nicolas MD;  Location: [facility] OR    [facility] REMOVAL OF KIDNEY STONE              Social History:     Social History     Tobacco Use    Smoking status: Never    Smokeless tobacco: Never   Substance Use Topics    Alcohol use: Yes     Comment: Alcoholic beverage once or twice per week.            Family History:     Family History   Problem Relation Age of Onset    Seizure Disorder Mother     Breast Cancer Mother     Cancer Father             Allergies:     Allergies   Allergen Reactions    Ondansetron      Other reaction(s): Hallucinations    Sulfa Antibiotics      unknown  Other reaction(s): Unknown            Medications:     Current Facility-Administered Medications   Medication    acetaminophen (TYLENOL) tablet 975 mg    cefTRIAXone  (ROCEPHIN) 1 g vial to attach to  mL bag for ADULTS or NS 50 mL bag for PEDS    levETIRAcetam (KEPPRA XR) 24 hr tablet 3,000 mg    lidocaine (LMX4) cream    lidocaine 1 % 0.1-1 mL    LORazepam (ATIVAN) injection 2 mg    melatonin tablet 1 mg    OXcarbazepine ER 24hour (OXTELLAR XR) 24 hr tablet 900 mg    prochlorperazine (COMPAZINE) injection 10 mg    Or    prochlorperazine (COMPAZINE) tablet 10 mg    Or    prochlorperazine (COMPAZINE) suppository 25 mg    sodium chloride (PF) 0.9% PF flush 3 mL    sodium chloride (PF) 0.9% PF flush 3 mL             Review of Systems:    ROS: 10 point ROS neg other than the symptoms noted above in the HPI           Physical Exam:   VS:  T: 97.5    HR: 66    BP: 138/89    RR: 18   GEN:  AOx3.  NAD.    CV:  RRR  LUNGS: Non-labored breathing.   BACK:  +CVA tenderness R  ABD:  Soft.  NT.  ND.  Mitrofanoff RLQ. Appears healthy and patent.              Data:   All laboratory data reviewed:    Recent Labs   Lab 07/31/23  0409 07/30/23  1350   WBC 16.3* 11.9*   HGB 14.8 15.8   * 135*       Recent Labs   Lab 07/31/23  0409 07/30/23  1350    138   POTASSIUM 4.4  4.4 3.2*   CHLORIDE 106 107   CO2 21* 17*   BUN 24.4* 22.5*   CR 1.36* 1.45*   * 105*   MIO 9.0 9.7       Recent Labs   Lab 07/30/23  1315   COLOR Yellow   APPEARANCE Slightly Cloudy*   URINEGLC Negative   URINEBILI Negative   URINEKETONE Negative   SG 1.011   URINEPH 7.0   PROTEIN 200*   NITRITE Positive*   LEUKEST Large*   RBCU 41*   WBCU 72*       All pertinent imaging reviewed:    CT scan of the abdomen:      IMPRESSION:   1. Substantially increased chronic right perinephric fat stranding  concerning for acute on chronic pyelonephritis.  2. Chronic bilateral hydronephrosis and hydroureters without evidence  of acute obstruction.  3. Postsurgical changes of bladder exstrophy repair with Mitrofanoff  procedure. Small bladder stone is similar to prior studies,  indeterminate if recurrent or unchanged  "bladder stone.    Renal ultrasound:               Impression and Plan:   Impression:   \"Olu Trinh is a 38 year old male admitted on 7/30/2023. 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    We discussed that irrigation more regularly will likely greatly decrease the frequency of his UTIs. He should aim to perform irrigation at least a few times/week, ideally daily. We reviewed how to perform irrigations together. We discussed that we still recommend he be seen in a few months for an office cystoscopy to evaluate for mucus burden.    He does have a small bladder stone that is stable in size. This was noted previously on cystoscopy. If still present on cystoscopy in the future, could be removed especially if he develops another UTI as this could be a nidus.      He has chronic BL hydronephrosis which is unchanged at this time.           Plan:  -f/u in 1-2 months for office cystoscopy  -pyelonephritis/UTI treatment per medicine,would treat based on prior Cx as yesterday's Cxs are negative so far  -continue CIC regimen and recommend daily irrigations  -urology will s/o, please page/ call with questions    This patient's exam findings, labs, and imaging discussed with urology staff surgeon Dr. Atif Funes MD, who developed the treatment plan.    Dain Huff MD  Urology Resident    I discussed with residents and agree.  I did not see patient.  Atif Funes MD             "

## 2023-07-31 NOTE — PROGRESS NOTES
[facility]    Medicine Progress Note - Hospitalist Service, GOLD TEAM 22    Date of Admission:  7/30/2023    Assessment & Plan      Olu Trinh is a 38 year old male admitted on 7/30/2023. 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    Acute Pyelonephritis  Neurogenic bladder secondary to bladder/cloacal exstrophy s/p mitrofanoff  Chronic urinary stones  - continue ceftriaxone  - follow urine culture  - APAP, hold ibuprofen given acute kidney injury   - urology consult  - would not use nitrofurantoin empirically in the future    Acute kidney injury   - most likely in the setting of illness, suspect pre-renal  - follow creatinine    Epilepsy: has been very well controlled for the last year  - continue home keppra and oxcarbazepine  - seizure precautions  - lorazepam 2 mg IV for seizure      Diet: Combination Diet Regular Diet Adult    DVT Prophylaxis: Pneumatic Compression Devices  Shi Catheter: Not present  Lines: None     Cardiac Monitoring: None  Code Status: Full Code      Clinically Significant Risk Factors Present on Admission        # Hypokalemia: Lowest K = 3.2 mmol/L in last 2 days, will replace as needed                # Severe Obesity: Estimated body mass index is 40.69 kg/m  as calculated from the following:    Height as of this encounter: 1.829 m (6').    Weight as of this encounter: 136.1 kg (300 lb).            Disposition Plan      Expected Discharge Date: 08/02/2023                  Rambo Denton MD  Hospitalist Service, GOLD TEAM 22  [facility]  Securely message with Realm (more info)  Text page via [facility] Paging/Directory   See signed in provider for up to date  coverage information  ______________________________________________________________________    Interval History   Overnight admitted for pyelonephritis. Pain improved today. No concerns right now.     Physical Exam   Vital Signs: Temp: 97.5  F (36.4  C) Temp src: Oral BP: 138/89 Pulse: 66   Resp: 18 SpO2: 95 % O2 Device: None (Room air)    Weight: 300 lbs 0 oz    Gen: NAD, sitting comfortably in bed  Eyes: EOMI, conjuctiva clear  CV: RRR, no murmurs, 2+ radial pulses  RESP: CTA bilaterally, no w/r/c  Abd: soft, nontender, nondistended  Ext: no edema bilaterally    Medical Decision Making       45 MINUTES SPENT BY ME on the date of service doing chart review, history, exam, documentation & further activities per the note.      Data     I have personally reviewed the following data over the past 24 hrs:    16.3 (H)  \   14.8   / 140 (L)     137 106 24.4 (H) /  113 (H)   4.4; 4.4 21 (L) 1.36 (H) \     ALT: 27 AST: 29 AP: 43 TBILI: 0.6   ALB: 4.1 TOT PROTEIN: 6.7 LIPASE: N/A       Imaging results reviewed over the past 24 hrs:   Recent Results (from the past 24 hour(s))   CT Abdomen Pelvis w/o Contrast    Narrative    EXAMINATION: CT ABDOMEN PELVIS W/O CONTRAST, 7/30/2023 2:44 PM    INDICATION: UTI, history of renal stones    COMPARISON STUDY: Abdomen 722, outside CT 9/11/2020 and 12/2/2020    TECHNIQUE: CT scan of the abdomen and pelvis was performed on  multidetector CT scanner using volumetric acquisition technique and  images were reconstructed in multiple planes with variable thickness  and reviewed on dedicated workstations.     CONTRAST: None     CT scan radiation dose is optimized to minimum requisite dose using  automated dose modulation techniques.    FINDINGS:    Lower thorax: Bibasilar dependent subsegmental atelectasis.    Liver: No mass. No intrahepatic biliary ductal dilation.    Biliary System: Normal gallbladder. No extrahepatic biliary ductal  dilation.    Pancreas: No mass or pancreatic ductal  dilation.    Adrenal glands: No mass or nodules    Spleen: Normal. Splenule.    Kidneys: Bilateral hydronephrosis and hydroureters, worse in the  right. There is chronic enlargement of the right kidney with chronic  surrounding fat stranding, although the degree of fat stranding  appears substantially increased from 11/7/2022. Irregular cortex of  the left kidney with multiple areas of cortical thinning.  Nonobstructive bilateral nephrolithiasis. No suspicious renal masses.    Gastrointestinal tract :Post-operative changes of appendectomy. Normal  caliber small bowel.  Postoperative changes of small bowel resection  with small bowel to small bowel anastomosis in the right lower  quadrant.    Mesentery/peritoneum/retroperitoneum: No mass. No free fluid or air.    Lymph nodes: Multiple sub centimeter para-aortic lymph nodes.    Vasculature: Patent major abdominal vasculature.    Pelvis: Postsurgical changes of bladder exstrophy repair with  Mitrofanoff procedure and right lower quadrant urinostomy. There is a  small amount of air in the neobladder with a small dependent stone  measuring approximately 12 x 8 mm. This is present on prior studies,  although indeterminate if unchanged versus recurrent stone.    Osseous structures: No aggressive or acute osseous lesion.      Soft tissues: Within normal limits.      Impression    IMPRESSION:   1. Substantially increased chronic right perinephric fat stranding  concerning for acute on chronic pyelonephritis.  2. Chronic bilateral hydronephrosis and hydroureters without evidence  of acute obstruction.  3. Postsurgical changes of bladder exstrophy repair with Mitrofanoff  procedure. Small bladder stone is similar to prior studies,  indeterminate if recurrent or unchanged bladder stone.    I have personally reviewed the examination and initial interpretation  and I agree with the findings.    RAMÓN CONTRERAS MD         SYSTEM ID:  [id number]

## 2023-08-01 ENCOUNTER — PATIENT OUTREACH (OUTPATIENT)
Dept: CARE COORDINATION | Facility: CLINIC | Age: 38
End: 2023-08-01
Payer: COMMERCIAL

## 2023-08-01 NOTE — PROGRESS NOTES
"Clinic Care Coordination Contact  [facility]: Post-Discharge Note  SITUATION                                                      Admission:    Admission Date: 07/30/23   Reason for Admission: abdominal pain  Discharge:   Discharge Date: 07/31/23  Discharge Diagnosis: Pyelonephritis    BACKGROUND                                                      Per hospital discharge summary and inpatient provider notes:Olu Trinh is a 38 year old male admitted on 7/30/2023. 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     Acute Pyelonephritis  Neurogenic bladder secondary to bladder/cloacal exstrophy s/p mitrofanoff  Chronic urinary stones  - initially treated with ceftriaxone, discharge on 7 days of ciprofloxacin  - urine culture with mixed dea, will choose empiric antibiotics based on prior cultures  - APAP for pain      ASSESSMENT           Discharge Assessment  How are you doing now that you are home?: \" I am feeling better meds are working \"  How are your symptoms? (Red Flag symptoms escalate to triage hotline per guidelines): Improved  Do you feel your condition is stable enough to be safe at home until your provider visit?: Yes  Does the patient have their discharge instructions? : Yes  Does the patient have questions regarding their discharge instructions? : No  Were you started on any new medications or were there changes to any of your previous medications? : Yes  Does the patient have all of their medications?: Yes  Do you have questions regarding any of your medications? : No  Do you have all of your needed medical supplies or equipment (DME)?  (i.e. oxygen tank, CPAP, cane, etc.): Yes  Discharge follow-up appointment scheduled within 14 calendar days? : No    Post-op " ([facility] CTA Only)  If the patient had a surgery or procedure, do they have any questions for a nurse?: No             PLAN                                                      Outpatient Plan: Follow-up Appointments     Adult [facility] Follow-up and recommended labs and tests      Follow up with primary care provider, Álvaro Drew, within 7-14 days for   hospital follow- up.  No follow up labs or test are needed.       Appointments on [place] and/or [facility] (with [facility] or [facility]   provider or service). Call [phone] if you haven't heard regarding   these appointments within 7 days of discharge.      Future Appointments   Date Time Provider Department Center   9/25/2023 12:00 PM Farrah Mckeon MD [facility]         For any urgent concerns, please contact our 24 hour nurse triage line: [phone] ([phone])         Sendy Herrera

## 2023-08-19 ENCOUNTER — HEALTH MAINTENANCE LETTER (OUTPATIENT)
Age: 38
End: 2023-08-19

## 2023-08-28 ENCOUNTER — PRE VISIT (OUTPATIENT)
Dept: UROLOGY | Facility: CLINIC | Age: 38
End: 2023-08-28
Payer: COMMERCIAL

## 2023-08-28 NOTE — TELEPHONE ENCOUNTER
Reason for visit: Cystoscopy; survey for bladder stones and mucus     Dx/Hx/Sx: Neurogenic bladder    Records/imaging/labs/orders: In EPIC    At Rooming: Cysto through Marta Galindo, EMT  08/28/23  9:09 AM

## 2023-09-25 ENCOUNTER — OFFICE VISIT (OUTPATIENT)
Dept: UROLOGY | Facility: CLINIC | Age: 38
End: 2023-09-25
Payer: COMMERCIAL

## 2023-09-25 VITALS
WEIGHT: 300 LBS | HEART RATE: 60 BPM | BODY MASS INDEX: 40.63 KG/M2 | HEIGHT: 72 IN | DIASTOLIC BLOOD PRESSURE: 95 MMHG | SYSTOLIC BLOOD PRESSURE: 133 MMHG

## 2023-09-25 DIAGNOSIS — N31.9 NEUROGENIC BLADDER: Primary | ICD-10-CM

## 2023-09-25 PROCEDURE — 52000 CYSTOURETHROSCOPY: CPT | Performed by: STUDENT IN AN ORGANIZED HEALTH CARE EDUCATION/TRAINING PROGRAM

## 2023-09-25 RX ORDER — LIDOCAINE HYDROCHLORIDE 20 MG/ML
JELLY TOPICAL ONCE
Status: CANCELLED | OUTPATIENT
Start: 2023-09-25 | End: 2023-09-25

## 2023-09-25 ASSESSMENT — PAIN SCALES - GENERAL: PAINLEVEL: NO PAIN (0)

## 2023-09-25 NOTE — PATIENT INSTRUCTIONS
"6 month Cysto follow up Dr. Mckeon  AFTER YOUR CYSTOSCOPY        You have just completed a cystoscopy, or \"cysto\", which allowed your physician to learn more about your bladder (or to remove a stent placed after surgery). We suggest that you continue to avoid caffeine, fruit juice, and alcohol for the next 24 hours, however, you are encouraged to return to your normal activities.         A few things that are considered normal after your cystoscopy:     * Small amount of bleeding (or spotting) that clears within the next 24 hours     * Slight burning sensation with urination     * Sensation to of needing to avoid more frequently     * The feeling of \"air\" in your urine     * Mild discomfort that is relieved with Tylenol        Please contact our office promptly if you:     * Develop a fever above 101 degrees     * Are unable to urinate     * Develop bright red blood that does not stop     * Severe pain or swelling         Please contact our office with any concerns or questions @[email or web address].  "

## 2023-09-25 NOTE — PROGRESS NOTES
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 Has had bladder stones removed in past (2021, 2018 Dr. Ibrahim, 2015 percutaneous). Has chronic bilateral hydronephrosis.  He Caths per mitrofanoff stoma into augmented bladder with 14fr straight cath q3-4h    He was instructed to irrigate regularly at last visit. A few months ago he had some difficulty with irrigation followed by a period of retention and  then hospitalization with UTI/pyelonephritis. Since that time he has been doing well. He has been irrigating 3x weekly and gets a moderate amount of mucous out when he irrigates and performs CIC    Description of Procedure:    After informed consent was obtained, the patient was brought to the procedure room and placed in the supine position.  The catheterizable was prepped and draped in a sterile fashion.    There was no stenosis or erythema at the stomal site.     A 16F flexible cystoscope was introduced through the channel. This was done without resistance.   The channel showed  no evidence of stricture.   The bladder was free of tumors or stones.     The augment appeared to be healthy   There was a moderate amount of mucous in the bladder that was irrigated out without difficulty.    The cystoscope was removed and the findings were explained to the patient.    Assessment and Plan:  37yoM with exstrophy, neurogenic bladder managed with CIC through Mitrofanoff.     Recommend daily irrigation with 16Fr tyler  RTC for cysto in 6 months to survey for bladder stones and mucus.       Farrah Mckeon MD  05/22/23

## 2023-09-25 NOTE — NURSING NOTE
Chief Complaint   Patient presents with    Cyst     1-2 month cysto         Blood pressure (!) 133/95, pulse 60, height 1.829 m (6'), weight 136.1 kg (300 lb). Body mass index is 40.69 kg/m .    Patient Active Problem List   Diagnosis    Attention deficit hyperactivity disorder (ADHD)    Neurogenic bladder    Other hydronephrosis    Bladder stone    Exstrophy of bladder sequence    Intractable epilepsy without status epilepticus, unspecified epilepsy type (H)    Pyelonephritis    E coli bacteremia       Allergies   Allergen Reactions    Ondansetron      Other reaction(s): Hallucinations    Sulfa Antibiotics      unknown  Other reaction(s): Unknown       Current Outpatient Medications   Medication Sig Dispense Refill    Cranberry 500 MG CAPS Take 650 mg by mouth      levETIRAcetam (KEPPRA XR) 500 MG 24 hr tablet Take 3,000 mg by mouth      OXcarbazepine ER 24hour (OXTELLAR XR) 600 MG 24 hr tablet 1.5 tablet (900 mg)         Social History     Tobacco Use    Smoking status: Never    Smokeless tobacco: Never   Vaping Use    Vaping Use: Never used   Substance Use Topics    Alcohol use: Yes     Comment: Alcoholic beverage once or twice per week.    Drug use: No       eJssy Moulton MA  9/25/2023  11:48 AM

## 2023-09-25 NOTE — LETTER
9/25/2023       RE: Olu Trinh  [address]  [place] [address]     Dear Colleague,    Thank you for referring your patient, Olu Trinh, to the [facility] [place] at [facility]. Please see a copy of my visit note below.      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 Has had bladder stones removed in past (2021, 2018 Dr. Ibrahim, 2015 percutaneous). Has chronic bilateral hydronephrosis.  He Caths per mitrofanoff stoma into augmented bladder with 14fr straight cath q3-4h    He was instructed to irrigate regularly at last visit. A few months ago he had some difficulty with irrigation followed by a period of retention and  then hospitalization with UTI/pyelonephritis. Since that time he has been doing well. He has been irrigating 3x weekly and gets a moderate amount of mucous out when he irrigates and performs CIC    Description of Procedure:    After informed consent was obtained, the patient was brought to the procedure room and placed in the supine position.  The catheterizable was prepped and draped in a sterile fashion.    There was no stenosis or erythema at the stomal site.     A 16F flexible cystoscope was introduced through the channel. This was done without resistance.   The channel showed  no evidence of stricture.   The bladder was free of tumors or stones.     The augment appeared to be healthy   There was a moderate amount of mucous in the bladder that was irrigated out without difficulty.    The cystoscope was removed and the findings were explained to the patient.    Assessment and Plan:  37yoM with exstrophy, neurogenic bladder  managed with CIC through Mitrofanoff.     Recommend daily irrigation with 16Fr tyler  RTC for cysto in 6 months to survey for bladder stones and mucus.       Farrah Mckeon MD  05/22/23

## 2023-10-02 ENCOUNTER — MEDICAL CORRESPONDENCE (OUTPATIENT)
Dept: HEALTH INFORMATION MANAGEMENT | Facility: CLINIC | Age: 38
End: 2023-10-02
Payer: COMMERCIAL

## 2023-10-02 ENCOUNTER — DOCUMENTATION ONLY (OUTPATIENT)
Dept: UROLOGY | Facility: CLINIC | Age: 38
End: 2023-10-02
Payer: COMMERCIAL

## 2023-10-02 NOTE — PROGRESS NOTES
Type of Form Received: Order (self cath)    Form Received (Date) 10/2/23   Form Filled out Yes, date 10/2/23   Placed in provider folder Yes       Received Completed forms Yes   Faxed Forms Faxed To: Mickie  Fax Number: [phone]   Sent to HIM (Date) 10/2/23

## 2023-11-03 ENCOUNTER — TELEPHONE (OUTPATIENT)
Dept: UROLOGY | Facility: CLINIC | Age: 38
End: 2023-11-03

## 2023-11-03 ENCOUNTER — LAB (OUTPATIENT)
Dept: LAB | Facility: CLINIC | Age: 38
End: 2023-11-03
Payer: COMMERCIAL

## 2023-11-03 DIAGNOSIS — R39.89 SUSPECTED UTI: ICD-10-CM

## 2023-11-03 LAB
ALBUMIN UR-MCNC: 100 MG/DL
APPEARANCE UR: ABNORMAL
BACTERIA #/AREA URNS HPF: ABNORMAL /HPF
BILIRUB UR QL STRIP: NEGATIVE
COLOR UR AUTO: ABNORMAL
GLUCOSE UR STRIP-MCNC: NEGATIVE MG/DL
HGB UR QL STRIP: ABNORMAL
KETONES UR STRIP-MCNC: NEGATIVE MG/DL
LEUKOCYTE ESTERASE UR QL STRIP: ABNORMAL
NITRATE UR QL: POSITIVE
PH UR STRIP: 6.5 [PH] (ref 5–7)
RBC URINE: 5 /HPF
SP GR UR STRIP: 1.01 (ref 1–1.03)
TRANSITIONAL EPI: 1 /HPF
UROBILINOGEN UR STRIP-MCNC: NORMAL MG/DL
WBC CLUMPS #/AREA URNS HPF: PRESENT /HPF
WBC URINE: >182 /HPF

## 2023-11-03 PROCEDURE — 99000 SPECIMEN HANDLING OFFICE-LAB: CPT | Performed by: PATHOLOGY

## 2023-11-03 PROCEDURE — 87086 URINE CULTURE/COLONY COUNT: CPT | Performed by: STUDENT IN AN ORGANIZED HEALTH CARE EDUCATION/TRAINING PROGRAM

## 2023-11-03 PROCEDURE — 81001 URINALYSIS AUTO W/SCOPE: CPT | Performed by: PATHOLOGY

## 2023-11-03 NOTE — TELEPHONE ENCOUNTER
[facility] Call Center    Phone Message    May a detailed message be left on voicemail: yes     Reason for Call: Other: Patient calling requesting a UA for possible UTI. Patient is having UTI symptoms. Please advise and call patient back per patients request. Thank you!      Action Taken: Message routed to:  [facility] Uro    Travel Screening: Not Applicable

## 2023-11-04 ENCOUNTER — HOSPITAL ENCOUNTER (EMERGENCY)
Facility: CLINIC | Age: 38
Discharge: HOME | End: 2023-11-04
Attending: EMERGENCY MEDICINE | Admitting: EMERGENCY MEDICINE
Payer: COMMERCIAL

## 2023-11-04 VITALS
RESPIRATION RATE: 20 BRPM | SYSTOLIC BLOOD PRESSURE: 126 MMHG | HEART RATE: 105 BPM | OXYGEN SATURATION: 98 % | TEMPERATURE: 99 F | DIASTOLIC BLOOD PRESSURE: 86 MMHG

## 2023-11-04 DIAGNOSIS — N39.0 COMPLICATED UTI (URINARY TRACT INFECTION): ICD-10-CM

## 2023-11-04 LAB
ALBUMIN UR-MCNC: 200 MG/DL
ANION GAP SERPL CALCULATED.3IONS-SCNC: 14 MMOL/L (ref 7–15)
APPEARANCE UR: ABNORMAL
BACTERIA #/AREA URNS HPF: ABNORMAL /HPF
BACTERIA UR CULT: ABNORMAL
BASOPHILS # BLD AUTO: 0 10E3/UL (ref 0–0.2)
BASOPHILS NFR BLD AUTO: 0 %
BILIRUB UR QL STRIP: NEGATIVE
BUN SERPL-MCNC: 16.2 MG/DL (ref 6–20)
CALCIUM SERPL-MCNC: 9.8 MG/DL (ref 8.6–10)
CHLORIDE SERPL-SCNC: 100 MMOL/L (ref 98–107)
COLOR UR AUTO: ABNORMAL
CREAT SERPL-MCNC: 1.37 MG/DL (ref 0.67–1.17)
DEPRECATED HCO3 PLAS-SCNC: 19 MMOL/L (ref 22–29)
EGFRCR SERPLBLD CKD-EPI 2021: 68 ML/MIN/1.73M2
EOSINOPHIL # BLD AUTO: 0 10E3/UL (ref 0–0.7)
EOSINOPHIL NFR BLD AUTO: 0 %
ERYTHROCYTE [DISTWIDTH] IN BLOOD BY AUTOMATED COUNT: 12.8 % (ref 10–15)
GLUCOSE SERPL-MCNC: 111 MG/DL (ref 70–99)
GLUCOSE UR STRIP-MCNC: NEGATIVE MG/DL
HCT VFR BLD AUTO: 44.9 % (ref 40–53)
HGB BLD-MCNC: 15.1 G/DL (ref 13.3–17.7)
HGB UR QL STRIP: ABNORMAL
IMM GRANULOCYTES # BLD: 0.1 10E3/UL
IMM GRANULOCYTES NFR BLD: 1 %
KETONES UR STRIP-MCNC: NEGATIVE MG/DL
LACTATE SERPL-SCNC: 0.9 MMOL/L (ref 0.7–2)
LEUKOCYTE ESTERASE UR QL STRIP: ABNORMAL
LYMPHOCYTES # BLD AUTO: 1.3 10E3/UL (ref 0.8–5.3)
LYMPHOCYTES NFR BLD AUTO: 8 %
MCH RBC QN AUTO: 30 PG (ref 26.5–33)
MCHC RBC AUTO-ENTMCNC: 33.6 G/DL (ref 31.5–36.5)
MCV RBC AUTO: 89 FL (ref 78–100)
MONOCYTES # BLD AUTO: 1.7 10E3/UL (ref 0–1.3)
MONOCYTES NFR BLD AUTO: 11 %
MUCOUS THREADS #/AREA URNS LPF: PRESENT /LPF
NEUTROPHILS # BLD AUTO: 12.5 10E3/UL (ref 1.6–8.3)
NEUTROPHILS NFR BLD AUTO: 80 %
NITRATE UR QL: NEGATIVE
NRBC # BLD AUTO: 0 10E3/UL
NRBC BLD AUTO-RTO: 0 /100
PH UR STRIP: 6.5 [PH] (ref 5–7)
PLATELET # BLD AUTO: 171 10E3/UL (ref 150–450)
POTASSIUM SERPL-SCNC: 4.2 MMOL/L (ref 3.4–5.3)
RBC # BLD AUTO: 5.04 10E6/UL (ref 4.4–5.9)
RBC URINE: 1 /HPF
SODIUM SERPL-SCNC: 133 MMOL/L (ref 135–145)
SP GR UR STRIP: 1.01 (ref 1–1.03)
UROBILINOGEN UR STRIP-MCNC: NORMAL MG/DL
WBC # BLD AUTO: 15.6 10E3/UL (ref 4–11)
WBC CLUMPS #/AREA URNS HPF: PRESENT /HPF
WBC URINE: >182 /HPF

## 2023-11-04 PROCEDURE — 36415 COLL VENOUS BLD VENIPUNCTURE: CPT

## 2023-11-04 PROCEDURE — 81001 URINALYSIS AUTO W/SCOPE: CPT | Performed by: EMERGENCY MEDICINE

## 2023-11-04 PROCEDURE — 83605 ASSAY OF LACTIC ACID: CPT

## 2023-11-04 PROCEDURE — 258N000003 HC RX IP 258 OP 636

## 2023-11-04 PROCEDURE — 87186 SC STD MICRODIL/AGAR DIL: CPT | Performed by: EMERGENCY MEDICINE

## 2023-11-04 PROCEDURE — 96365 THER/PROPH/DIAG IV INF INIT: CPT | Performed by: EMERGENCY MEDICINE

## 2023-11-04 PROCEDURE — 85025 COMPLETE CBC W/AUTO DIFF WBC: CPT

## 2023-11-04 PROCEDURE — 99284 EMERGENCY DEPT VISIT MOD MDM: CPT | Mod: 25 | Performed by: EMERGENCY MEDICINE

## 2023-11-04 PROCEDURE — 99284 EMERGENCY DEPT VISIT MOD MDM: CPT | Mod: GC | Performed by: EMERGENCY MEDICINE

## 2023-11-04 PROCEDURE — 80048 BASIC METABOLIC PNL TOTAL CA: CPT

## 2023-11-04 PROCEDURE — 250N000011 HC RX IP 250 OP 636: Mod: JZ

## 2023-11-04 RX ORDER — CEFTRIAXONE 2 G/1
2 INJECTION, POWDER, FOR SOLUTION INTRAMUSCULAR; INTRAVENOUS ONCE
Status: COMPLETED | OUTPATIENT
Start: 2023-11-04 | End: 2023-11-04

## 2023-11-04 RX ORDER — CEFDINIR 300 MG/1
300 CAPSULE ORAL 2 TIMES DAILY
Qty: 20 CAPSULE | Refills: 0 | Status: SHIPPED | OUTPATIENT
Start: 2023-11-04 | End: 2023-11-14

## 2023-11-04 RX ADMIN — CEFTRIAXONE SODIUM 2 G: 2 INJECTION, POWDER, FOR SOLUTION INTRAMUSCULAR; INTRAVENOUS at 10:40

## 2023-11-04 RX ADMIN — SODIUM CHLORIDE 1000 ML: 9 INJECTION, SOLUTION INTRAVENOUS at 10:40

## 2023-11-04 ASSESSMENT — ACTIVITIES OF DAILY LIVING (ADL): ADLS_ACUITY_SCORE: 35

## 2023-11-04 NOTE — ED PROVIDER NOTES
ED Provider Note  [facility]      History     Chief Complaint   Patient presents with    UTI     HPI  Olu Trinh is a 38 year old male with h/o spina bifida, seizure disorder, chronic bilateral hydronephrosis, pyelonephritis, recurrent UTI's, bladder stones and neurogenic bladder s/p Mitrofanoff who presents with dysuria and SOB. Caths every few hours via Mitrofanoff access site and has had some pain around the site but no discharge. Also some pain with urination when urinating through his penis. He noticed his urine getting darker over the past couple of weeks but denies any blood in the urine or abnormal discharge. No fever, chills, or chest pain but says he started panting yesterday, which is typical for him when he gets a UTI. UA obtained yesterday notable for nitrites, LE, and pyuria and culture growing >100,000 cfu/ml E. Coli.    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    Learning disability     Neurogenic bladder, NOS 10/30/2012    Obesity (BMI 35.0-39.9 without comorbidity)     Other hydronephrosis 06/02/2015    Unspecified epilepsy with intractable epilepsy 10/22/2013     Past Surgical History:   Procedure Laterality Date    BLADDER AUGMENTATION      bladder neck reconstruction      Mikey Mayra Amaris    CYSTOSCOPY N/A 07/10/2018    Procedure: CYSTOSCOPY;  CYSTOSCOPY  OF NEOBLADDER  ;  Surgeon: Mario Ibrahim MD;  Location:  OR    CYSTOSCOPY VIA MITROFANOFF N/A 1/29/2021    Procedure: CYSTOSCOPY, VIA MITROFANOFF;  Surgeon: Joel Blunt MD;  Location: [facility] OR    LASER HOLMIUM LITHOTRIPSY BLADDER N/A 07/24/2015    Procedure: LASER HOLMIUM LITHOTRIPSY BLADDER;  Surgeon: Joel Blunt MD;  Location: [facility] OR    LASER HOLMIUM LITHOTRIPSY BLADDER N/A 07/10/2018    Procedure: LASER HOLMIUM LITHOTRIPSY  BLADDER;;  Surgeon: Mario Ibrahim MD;  Location: [facility] OR    LEFT TEMPORAL CRANIOTOMY FOR ANTERIOR TEMPORAL LOBECTOMY  10/01/2018    MITROFANOFF PROCEDURE (APPENDIX CONDUIT)  01/01/2002    PERCUTANEOUS CYSTOLITHOTOMY N/A 9/9/2022    Procedure: CYSTOLITHOTOMY, WITH LASER STANDBY;  Surgeon: Manjit Nicolas MD;  Location: [facility] [place]    [facility] REMOVAL OF KIDNEY STONE       cefdinir (OMNICEF) 300 MG capsule  Cranberry 500 MG CAPS  levETIRAcetam (KEPPRA XR) 500 MG 24 hr tablet  OXcarbazepine ER 24hour (OXTELLAR XR) 600 MG 24 hr tablet      Allergies   Allergen Reactions    Ondansetron      Other reaction(s): Hallucinations    Sulfa Antibiotics      unknown  Other reaction(s): Unknown     Family History  Family History   Problem Relation Age of Onset    Seizure Disorder Mother     Breast Cancer Mother     Cancer Father      Social History   Social History     Tobacco Use    Smoking status: Never    Smokeless tobacco: Never   Vaping Use    Vaping Use: Never used   Substance Use Topics    Alcohol use: Yes     Comment: Alcoholic beverage once or twice per week.    Drug use: No         A medically appropriate review of systems was performed with pertinent positives and negatives noted in the HPI, and all other systems negative.    Physical Exam   BP: 126/86  Pulse: 105  Temp: 99  F (37.2  C)  Resp: 20  SpO2: 98 %  Physical Exam  Constitutional:       General: He is in acute distress.      Appearance: Normal appearance. He is not toxic-appearing or diaphoretic.   HENT:      Head: Normocephalic and atraumatic.   Eyes:      General: No scleral icterus.  Cardiovascular:      Rate and Rhythm: Tachycardia present.      Heart sounds: No murmur heard.     No friction rub. No gallop.   Pulmonary:      Effort: No respiratory distress.      Breath sounds: No wheezing, rhonchi or rales.   Abdominal:      General: There is no distension.      Tenderness: There is left CVA tenderness. There is no right CVA tenderness, guarding or rebound.       Comments: Suprapubic tenderness present   Musculoskeletal:      Right lower leg: No edema.      Left lower leg: No edema.   Skin:     General: Skin is warm and dry.   Neurological:      Mental Status: He is alert and oriented to person, place, and time.   Psychiatric:         Mood and Affect: Mood normal.         Behavior: Behavior normal.         Thought Content: Thought content normal.         Judgment: Judgment normal.         ED Course, Procedures, & Data      Procedures              Results for orders placed or performed during the hospital encounter of 11/04/23   UA with Microscopic reflex to Culture     Status: Abnormal    Specimen: Urine, Catheter   Result Value Ref Range    Color Urine Light Yellow Colorless, Straw, Light Yellow, Yellow    Appearance Urine Slightly Cloudy (A) Clear    Glucose Urine Negative Negative mg/dL    Bilirubin Urine Negative Negative    Ketones Urine Negative Negative mg/dL    Specific Gravity Urine 1.008 1.003 - 1.035    Blood Urine Trace (A) Negative    pH Urine 6.5 5.0 - 7.0    Protein Albumin Urine 200 (A) Negative mg/dL    Urobilinogen Urine Normal Normal, 2.0 mg/dL    Nitrite Urine Negative Negative    Leukocyte Esterase Urine Large (A) Negative    Bacteria Urine Many (A) None Seen /HPF    WBC Clumps Urine Present (A) None Seen /HPF    Mucus Urine Present (A) None Seen /LPF    RBC Urine 1 <=2 /HPF    WBC Urine >182 (H) <=5 /HPF    Narrative    Urine Culture ordered based on laboratory criteria   Basic metabolic panel     Status: Abnormal   Result Value Ref Range    Sodium 133 (L) 135 - 145 mmol/L    Potassium 4.2 3.4 - 5.3 mmol/L    Chloride 100 98 - 107 mmol/L    Carbon Dioxide (CO2) 19 (L) 22 - 29 mmol/L    Anion Gap 14 7 - 15 mmol/L    Urea Nitrogen 16.2 6.0 - 20.0 mg/dL    Creatinine 1.37 (H) 0.67 - 1.17 mg/dL    GFR Estimate 68 >60 mL/min/1.73m2    Calcium 9.8 8.6 - 10.0 mg/dL    Glucose 111 (H) 70 - 99 mg/dL   Lactic acid whole blood     Status: Normal   Result  Value Ref Range    Lactic Acid 0.9 0.7 - 2.0 mmol/L   CBC with platelets and differential     Status: Abnormal   Result Value Ref Range    WBC Count 15.6 (H) 4.0 - 11.0 10e3/uL    RBC Count 5.04 4.40 - 5.90 10e6/uL    Hemoglobin 15.1 13.3 - 17.7 g/dL    Hematocrit 44.9 40.0 - 53.0 %    MCV 89 78 - 100 fL    MCH 30.0 26.5 - 33.0 pg    MCHC 33.6 31.5 - 36.5 g/dL    RDW 12.8 10.0 - 15.0 %    Platelet Count 171 150 - 450 10e3/uL    % Neutrophils 80 %    % Lymphocytes 8 %    % Monocytes 11 %    % Eosinophils 0 %    % Basophils 0 %    % Immature Granulocytes 1 %    NRBCs per 100 WBC 0 <1 /100    Absolute Neutrophils 12.5 (H) 1.6 - 8.3 10e3/uL    Absolute Lymphocytes 1.3 0.8 - 5.3 10e3/uL    Absolute Monocytes 1.7 (H) 0.0 - 1.3 10e3/uL    Absolute Eosinophils 0.0 0.0 - 0.7 10e3/uL    Absolute Basophils 0.0 0.0 - 0.2 10e3/uL    Absolute Immature Granulocytes 0.1 <=0.4 10e3/uL    Absolute NRBCs 0.0 10e3/uL   CBC with platelets differential     Status: Abnormal    Narrative    The following orders were created for panel order CBC with platelets differential.  Procedure                               Abnormality         Status                     ---------                               -----------         ------                     CBC with platelets and d...[[id number]]  Abnormal            Final result                 Please view results for these tests on the individual orders.     Medications   sodium chloride 0.9% BOLUS 1,000 mL (0 mLs Intravenous Stopped 11/4/23 1140)   cefTRIAXone (ROCEPHIN) 2 g vial to attach to  ml bag for ADULTS or NS 50 ml bag for PEDS (0 g Intravenous Stopped 11/4/23 1140)     Labs Ordered and Resulted from Time of ED Arrival to Time of ED Departure   ROUTINE UA WITH MICROSCOPIC REFLEX TO CULTURE - Abnormal       Result Value    Color Urine Light Yellow      Appearance Urine Slightly Cloudy (*)     Glucose Urine Negative      Bilirubin Urine Negative      Ketones Urine Negative      Specific  Gravity Urine 1.008      Blood Urine Trace (*)     pH Urine 6.5      Protein Albumin Urine 200 (*)     Urobilinogen Urine Normal      Nitrite Urine Negative      Leukocyte Esterase Urine Large (*)     Bacteria Urine Many (*)     WBC Clumps Urine Present (*)     Mucus Urine Present (*)     RBC Urine 1      WBC Urine >182 (*)    BASIC METABOLIC PANEL - Abnormal    Sodium 133 (*)     Potassium 4.2      Chloride 100      Carbon Dioxide (CO2) 19 (*)     Anion Gap 14      Urea Nitrogen 16.2      Creatinine 1.37 (*)     GFR Estimate 68      Calcium 9.8      Glucose 111 (*)    CBC WITH PLATELETS AND DIFFERENTIAL - Abnormal    WBC Count 15.6 (*)     RBC Count 5.04      Hemoglobin 15.1      Hematocrit 44.9      MCV 89      MCH 30.0      MCHC 33.6      RDW 12.8      Platelet Count 171      % Neutrophils 80      % Lymphocytes 8      % Monocytes 11      % Eosinophils 0      % Basophils 0      % Immature Granulocytes 1      NRBCs per 100 WBC 0      Absolute Neutrophils 12.5 (*)     Absolute Lymphocytes 1.3      Absolute Monocytes 1.7 (*)     Absolute Eosinophils 0.0      Absolute Basophils 0.0      Absolute Immature Granulocytes 0.1      Absolute NRBCs 0.0     LACTIC ACID WHOLE BLOOD - Normal    Lactic Acid 0.9     URINE CULTURE     No orders to display        Critical care was not performed.     Medical Decision Making  The patient's presentation was of moderate complexity (an undiagnosed new problem with uncertain diagnosis).    The patient's evaluation involved:  review of 2 test result(s) ordered prior to this encounter (prior metabolic panel and prior urine cultures)  Review of 2+ previous notes  Review of the test done by another provider  The patient's management necessitated moderate risk (prescription drug management including medications given in the ED).    Assessment & Plan    Olu Trinh is a 38 year old male with h/o spina bifida, seizure disorder, chronic bilateral hydronephrosis, pyelonephritis,  recurrent UTI's, bladder stones and neurogenic bladder s/p Mitrofanoff who presents with dysuria and SOB c/f complicated UTI. Differential includes cystitis, pyelonephritis, infected stone, perinephric abscess. Presentation most consistent with complicated UTI. Will hold off on imaging for now due to relatively reassuring vitals. Sensitivities not back from urine culture yesterday, but prior culture from 5/20/23 grew E. Coli that was ampicillin and nitrofurantoin resistant but sensitive to ceftriaxone, so will give one dose of IV ceftriaxone in ED. Lactate wnl. Cr of 1.37 is at baseline. Has leukocytosis with WBC of 15.6. UA notable for LE, many bacteria, nitrites, and pyuria, which is c/w UTI. He is feeling better on re-evaluation and is comfortable going home with oral antibiotics. Will discharge patient home on 10 day course of cefdinir. Instructed pt to follow up with urology as scheduled.     I have reviewed the nursing notes. I have reviewed the findings, diagnosis, plan and need for follow up with the patient.    Discharge Medication List as of 11/4/2023 11:40 AM        START taking these medications    Details   cefdinir (OMNICEF) 300 MG capsule Take 1 capsule (300 mg) by mouth 2 times daily for 10 days, Disp-20 capsule, R-0, Local Print             Final diagnoses:   Complicated UTI (urinary tract infection)   The patient's care was discussed with the ED attending, Dr. Deandra Ybarra MD  PGY-1 Internal Medicine [facility] EMERGENCY DEPARTMENT  11/4/2023     Manny Ybarra MD  Resident  11/04/23 1148      ED Attending Physician Attestation    I Ramírez Liriano DO, cared for this patient with the Resident. I have performed a history and physical examination of the patient and discussed management with the resident. I reviewed the resident's documentation above and agree with the documented findings and plan of care.    Summary of HPI, PE, ED Course   Patient   evaluated in the emergency department for suspected urinary tract infection.  His urine yesterday and today were consistent with this.  Labs are done and his kidney function is at baseline.  He is afebrile with otherwise acceptable vital signs.  Patient is requesting discharge home.  He was given a dose of Rocephin here and we will send him home with oral antibiotics.  Critical Care & Procedures  Not applicable.      Ramírez Liriano,   Emergency Medicine        Ramírez Liriano DO  11/04/23 [id number]

## 2023-11-04 NOTE — DISCHARGE INSTRUCTIONS
You were evaluated for a urinary tract infection. We collected lab studies and gave you a dose of IV antibiotics. We will give you a prescription for 10 days of an antibiotic called cefdinir. You should follow up with urology as scheduled. Return to the ED if symptoms worsen.

## 2023-11-04 NOTE — ED TRIAGE NOTES
Pt arrives ambulatory to triage c/o UTI  Was seen at clinic yesterday, got results this morning showing UTI.  Hx [facility]      Triage Assessment (Adult)       Row Name 11/04/23 0892          Triage Assessment    Airway WDL WDL        Respiratory WDL    Respiratory WDL X     Rhythm/Pattern, Respiratory shortness of breath        Skin Circulation/Temperature WDL    Skin Circulation/Temperature WDL WDL        Cardiac WDL    Cardiac WDL WDL        Peripheral/Neurovascular WDL    Peripheral Neurovascular WDL WDL        Cognitive/Neuro/Behavioral WDL    Cognitive/Neuro/Behavioral WDL WDL

## 2023-11-05 LAB — BACTERIA UR CULT: ABNORMAL

## 2023-12-05 ENCOUNTER — DOCUMENTATION ONLY (OUTPATIENT)
Dept: UROLOGY | Facility: CLINIC | Age: 38
End: 2023-12-05
Payer: COMMERCIAL

## 2023-12-05 NOTE — PROGRESS NOTES
Type of Form Received: Order (syringe)    Form Received (Date) 12/5/23   Form Filled out Yes, date 12/5/23   Placed in provider folder Yes       Received Completed forms Yes   Faxed Forms Faxed To: Mickie  Fax Number: [phone]   Sent to HIM (Date) 12/12/23

## 2023-12-11 ENCOUNTER — MEDICAL CORRESPONDENCE (OUTPATIENT)
Dept: HEALTH INFORMATION MANAGEMENT | Facility: CLINIC | Age: 38
End: 2023-12-11
Payer: COMMERCIAL

## 2024-01-11 ENCOUNTER — NURSE TRIAGE (OUTPATIENT)
Dept: NURSING | Facility: CLINIC | Age: 39
End: 2024-01-11

## 2024-01-11 ENCOUNTER — LAB (OUTPATIENT)
Dept: LAB | Facility: CLINIC | Age: 39
End: 2024-01-11
Payer: COMMERCIAL

## 2024-01-11 DIAGNOSIS — N31.9 NEUROGENIC BLADDER: Primary | ICD-10-CM

## 2024-01-11 DIAGNOSIS — N31.9 NEUROGENIC BLADDER: ICD-10-CM

## 2024-01-11 LAB
ALBUMIN UR-MCNC: 100 MG/DL
APPEARANCE UR: ABNORMAL
BACTERIA #/AREA URNS HPF: ABNORMAL /HPF
BILIRUB UR QL STRIP: NEGATIVE
COLOR UR AUTO: ABNORMAL
GLUCOSE UR STRIP-MCNC: NEGATIVE MG/DL
HGB UR QL STRIP: ABNORMAL
KETONES UR STRIP-MCNC: NEGATIVE MG/DL
LEUKOCYTE ESTERASE UR QL STRIP: ABNORMAL
NITRATE UR QL: POSITIVE
PH UR STRIP: 6.5 [PH] (ref 5–7)
RBC URINE: 5 /HPF
SP GR UR STRIP: 1.01 (ref 1–1.03)
UROBILINOGEN UR STRIP-MCNC: NORMAL MG/DL
WBC CLUMPS #/AREA URNS HPF: PRESENT /HPF
WBC URINE: 90 /HPF

## 2024-01-11 PROCEDURE — 81001 URINALYSIS AUTO W/SCOPE: CPT | Performed by: PATHOLOGY

## 2024-01-11 PROCEDURE — 99000 SPECIMEN HANDLING OFFICE-LAB: CPT | Performed by: PATHOLOGY

## 2024-01-11 PROCEDURE — 87086 URINE CULTURE/COLONY COUNT: CPT | Performed by: STUDENT IN AN ORGANIZED HEALTH CARE EDUCATION/TRAINING PROGRAM

## 2024-01-11 NOTE — TELEPHONE ENCOUNTER
"  PT calling: would like to drop off urine sample- or come in for cathing for urine sample.  Feels like he has UTI   3AM  awoke with  pain -  right back to end of right hip,  Has a mitrofanoff   self caths and urinate via penis when mitrofanoff is full.   See note  Pt of  Mike BRUSH, Jose Raul BRUSH, Fidencio BRUSH    High priority note to Urology [facility] for call back with plan.  Pt # [phone]        Reason for Disposition   Side (flank) or lower back pain present    Additional Information   Negative: Shock suspected (e.g., cold/pale/clammy skin, too weak to stand, low BP, rapid pulse)   Negative: Sounds like a life-threatening emergency to the triager   Negative: Followed a female genital area injury (e.g., labia, vagina, vulva)   Negative: Followed a male genital area injury (penis, scrotum)   Negative: Vaginal discharge   Negative: Pus (white, yellow) or bloody discharge from end of penis   Negative: Pain or burning with passing urine (urination) and pregnant   Negative: Pain or burning with passing urine (urination) and female   Negative: Pain or burning with passing urine (urination) and male   Negative: Pain or itching in the vulvar area   Negative: Pain in scrotum is main symptom   Negative: Blood in the urine is main symptom   Negative: Symptoms arising from use of a urinary catheter (e.g., Coude, Sih)   Negative: Decreased urination and drinking very little and dehydration suspected (e.g., dark urine, no urine > 12 hours, very dry mouth, very lightheaded)   Negative: Patient sounds very sick or weak to the triager   Negative: Fever > 100.4 F  (38.0 C)   Negative: Unable to urinate (or only a few drops) > 4 hours and bladder feels very full (e.g., palpable bladder or strong urge to urinate)    Answer Assessment - Initial Assessment Questions  1. SYMPTOM: \"What's the main symptom you're concerned about?\" (e.g., frequency, incontinence)  Pt feels he has a UTI would like to Drop of Urine sample         3AM  awoke with  " "pain -  right back to end of right hip,  Has a mitrofanoff   self caths and urinate via penis when mitrofanoff is full    Feels : panting- this happens when pt gets UTI- denies SOB, pain.    Pt unfocused during call, talking w/ others in the back ground, asked couple times if I could just call him back- he was busy.    Got information needed, and will send onto urology clinic.      2. ONSET: \"When did the    start?\"      today  3. PAIN: \"Is there any pain?\" If Yes, ask: \"How bad is it?\" (Scale: 1-10; mild, moderate, severe)      2 -     4. CAUSE: \"What do you think is causing the symptoms?\"      Feels he has a UTI  5. OTHER SYMPTOMS: \"Do you have any other symptoms?\" (e.g., blood in urine, fever, flank pain, pain with urination)      No fever, no change in color/smell to urine.    6. PREGNANCY: \"Is there any chance you are pregnant?\" \"When was your last menstrual period?\"    Protocols used: Urinary Symptoms-A-OH    "

## 2024-01-12 LAB — BACTERIA UR CULT: ABNORMAL

## 2024-01-26 ENCOUNTER — DOCUMENTATION ONLY (OUTPATIENT)
Dept: UROLOGY | Facility: CLINIC | Age: 39
End: 2024-01-26
Payer: COMMERCIAL

## 2024-01-26 NOTE — PROGRESS NOTES
Type of Form Received: Order (syringe)    Form Received (Date) 1/26/24   Form Filled out Yes, date 1/26/24   Placed in provider folder Yes       Received Completed forms Yes   Faxed Forms Faxed To: Mickie  Fax Number: [phone]   Sent to HIM (Date) 1/29/24

## 2024-01-29 ENCOUNTER — MEDICAL CORRESPONDENCE (OUTPATIENT)
Dept: HEALTH INFORMATION MANAGEMENT | Facility: CLINIC | Age: 39
End: 2024-01-29
Payer: COMMERCIAL

## 2024-03-01 ENCOUNTER — PRE VISIT (OUTPATIENT)
Dept: UROLOGY | Facility: CLINIC | Age: 39
End: 2024-03-01
Payer: COMMERCIAL

## 2024-03-01 NOTE — TELEPHONE ENCOUNTER
Reason for visit: Cystoscopy         Relevant information: Caths per mitrofanoff stoma into agumented bladder with 14Fr straight cath q3-4h. Recent pain in bladder area with frequency   hx of neurogenic bladder, secondary to bladder cloacal exstrophy. Some hx of bladder stones removed (2021,2018  ,2015 percutaneous).    Records/imaging/labs/orders: all records available    Pt called: no need for a call    At Rooming: ask symptoms, collect a urine/dip    Lito Dorantes  3/1/2024  3:35 PM

## 2024-03-25 ENCOUNTER — OFFICE VISIT (OUTPATIENT)
Dept: UROLOGY | Facility: CLINIC | Age: 39
End: 2024-03-25
Payer: COMMERCIAL

## 2024-03-25 VITALS
DIASTOLIC BLOOD PRESSURE: 89 MMHG | BODY MASS INDEX: 40.63 KG/M2 | WEIGHT: 300 LBS | HEIGHT: 72 IN | HEART RATE: 68 BPM | SYSTOLIC BLOOD PRESSURE: 143 MMHG

## 2024-03-25 DIAGNOSIS — N31.9 NEUROGENIC BLADDER: Primary | ICD-10-CM

## 2024-03-25 PROCEDURE — 52000 CYSTOURETHROSCOPY: CPT | Performed by: STUDENT IN AN ORGANIZED HEALTH CARE EDUCATION/TRAINING PROGRAM

## 2024-03-25 ASSESSMENT — PAIN SCALES - GENERAL: PAINLEVEL: NO PAIN (0)

## 2024-03-25 NOTE — PATIENT INSTRUCTIONS
"Please schedule a cystoscopy in 6 months with Dr. Mckeon, Dr Merino or the next fellow.    It was a pleasure meeting with you today.  Thank you for allowing me and my team the privilege of caring for you today.  YOU are the reason we are here, and I truly hope we provided you with the excellent service you deserve.  Please let us know if there is anything else we can do for you so that we can be sure you are leaving completely satisfied with your care experience.         AFTER YOUR CYSTOSCOPY        You have just completed a cystoscopy, or \"cysto\", which allowed your physician to learn more about your bladder (or to remove a stent placed after surgery). We suggest that you continue to avoid caffeine, fruit juice, and alcohol for the next 24 hours, however, you are encouraged to return to your normal activities.         A few things that are considered normal after your cystoscopy:     * Small amount of bleeding (or spotting) that clears within the next 24 hours     * Slight burning sensation with urination     * Sensation to of needing to avoid more frequently     * The feeling of \"air\" in your urine     * Mild discomfort that is relieved with Tylenol        Please contact our office promptly if you:     * Develop a fever above 101 degrees     * Are unable to urinate     * Develop bright red blood that does not stop     * Severe pain or swelling         Please contact our office with any concerns or questions @DEPHN.  "

## 2024-03-25 NOTE — PROGRESS NOTES
CYSTOSCOPY PROCEDURE    Pre-Procedure Diagnosis: Neurogenic bladder, bladder stones  Post-Procedure Diagnosis: same  Procedure: Cystoscopy through catheterizable channel    Surgeon: Farrah Mckeon MD    Indications:  Olu Trinh is a 39M with neurogenic bladder secondary to bladder/cloacal exstrophy. Has had bladder stones removed in past (2021, 2018 Dr. Ibrahim, 2015 percutaneous). Has chronic bilateral hydronephrosis.  He Caths per mitrofanoff stoma into augmented bladder with 14fr straight cath q3-4h    Denies UTIs or cathing issues since last visit.   Feels he is doing well and has no concerns or complaints today.    Description of Procedure:    After informed consent was obtained, the patient was brought to the procedure room and placed in the supine position.  The catheterizable was prepped and draped in a sterile fashion.    There was no stenosis or erythema at the stomal site.     A 16F flexible cystoscope was introduced through the channel. This was done without resistance.   The channel showed  no evidence of stricture.   The bladder was free of tumors or stones.     The augment appeared to be healthy   There was a moderate amount of mucous in the bladder that was irrigated out without difficulty.    The cystoscope was removed and the findings were explained to the patient.    Assessment and Plan:  39yoM with exstrophy, neurogenic bladder managed with CIC through Mitrofanoff.     Continue daily irrigation with 16Fr tyler  Gets catheters at [facility], may be due for new Rx  RTC for cysto in 6 months to survey for bladder stones and mucus.       Farrah Mckeon MD  05/22/23

## 2024-03-25 NOTE — NURSING NOTE
Chief Complaint   Patient presents with    Cystoscopy Follow-up     6 months       Blood pressure (!) 143/89, pulse 68, height 1.829 m (6'), weight 136.1 kg (300 lb). Body mass index is 40.69 kg/m .    Patient Active Problem List   Diagnosis    Attention deficit hyperactivity disorder (ADHD)    Neurogenic bladder    Other hydronephrosis    Bladder stone    Exstrophy of bladder sequence    Intractable epilepsy without status epilepticus, unspecified epilepsy type (H)    Pyelonephritis    E coli bacteremia       Allergies   Allergen Reactions    Ondansetron      Other reaction(s): Hallucinations    Sulfa Antibiotics      unknown  Other reaction(s): Unknown       Current Outpatient Medications   Medication Sig Dispense Refill    Cranberry 500 MG CAPS Take 650 mg by mouth      levETIRAcetam (KEPPRA XR) 500 MG 24 hr tablet Take 3,000 mg by mouth      OXcarbazepine ER 24hour (OXTELLAR XR) 600 MG 24 hr tablet 1.5 tablet (900 mg)         Social History     Tobacco Use    Smoking status: Never    Smokeless tobacco: Never   Vaping Use    Vaping Use: Never used   Substance Use Topics    Alcohol use: Yes     Comment: Alcoholic beverage once or twice per week.    Drug use: No       What to expect after the procedure reviewed with patient: Yes    Ester Robbins CMA  3/25/2024  12:07 PM     Invasive Procedure Safety Checklist:    Procedure: Cystoscopy     Action: Complete sections and checkboxes as appropriate.    Pre-procedure:  1. Patient ID Verified with 2 identifiers (Yaa and  or MRN) : YES    2. Procedure and site verified with patient/designee (when able) : YES    3. Accurate consent documentation in medical record : YES    4. H&P (or appropriate assessment) documented in medical record : YES  H&P must be up to 30 days prior to procedure an updated within 24 hours of                 Procedure as applicable.     5. Relevant diagnostic and radiology test results appropriately labeled and displayed as applicable :  YES    6. Blood products, implants, devices, and/or special equipment available for the procedure as applicable : YES    7. Procedure site(s) marked with provider initials [Exclusions: None] : NO    8. Marking not required. Reason : Yes  Procedure does not require site marking    Time Out:     Time-Out performed immediately prior to starting procedure, including verbal and active participation of all team members addressing: YES    1. Correct patient identity.  2. Confirmed that the correct side and site are marked.  3. An accurate procedure to be done.  4. Agreement on the procedure to be done.  5. Correct patient position.  6. Relevant images and results are properly labeled and appropriately displayed.  7. The need to administer antibiotics or fluids for irrigation purposes during the procedure as applicable.  8. Safety precautions based on patient history or medication use.    During Procedure: Verification of correct person, site, and procedure occurs any time the responsibility for care of the patient is transferred to another member of the care team.    No medications administered during this procedure.    Ester Robbins CMA  March 25, 2024

## 2024-03-25 NOTE — LETTER
3/25/2024       RE: Olu Trinh  [address]  [facility] [address]     Dear Colleague,    Thank you for referring your patient, Olu Trinh, to the [facility] [place] at [facility]. Please see a copy of my visit note below.      CYSTOSCOPY PROCEDURE    Pre-Procedure Diagnosis: Neurogenic bladder, bladder stones  Post-Procedure Diagnosis: same  Procedure: Cystoscopy through catheterizable channel    Surgeon: Farrah Mckeon MD    Indications:  Olu Trinh is a 39M with neurogenic bladder secondary to bladder/cloacal exstrophy. Has had bladder stones removed in past (2021, 2018 Dr. Ibrahim, 2015 percutaneous). Has chronic bilateral hydronephrosis.  He Caths per mitrofanoff stoma into augmented bladder with 14fr straight cath q3-4h    Denies UTIs or cathing issues since last visit.   Feels he is doing well and has no concerns or complaints today.    Description of Procedure:    After informed consent was obtained, the patient was brought to the procedure room and placed in the supine position.  The catheterizable was prepped and draped in a sterile fashion.    There was no stenosis or erythema at the stomal site.     A 16F flexible cystoscope was introduced through the channel. This was done without resistance.   The channel showed  no evidence of stricture.   The bladder was free of tumors or stones.     The augment appeared to be healthy   There was a moderate amount of mucous in the bladder that was irrigated out without difficulty.    The cystoscope was removed and the findings were explained to the patient.    Assessment and Plan:  39yoM with exstrophy, neurogenic bladder managed with CIC through Mitrofanoff.     Continue daily irrigation with 16Fr tyler  Gets catheters at [facility], may be due for new Rx  RTC for cysto in 6 months to survey for bladder stones and mucus.       Farrah Mckeon,  MD  05/22/23

## 2024-04-17 ENCOUNTER — LAB (OUTPATIENT)
Dept: LAB | Facility: CLINIC | Age: 39
End: 2024-04-17
Payer: COMMERCIAL

## 2024-04-17 ENCOUNTER — NURSE TRIAGE (OUTPATIENT)
Dept: NURSING | Facility: CLINIC | Age: 39
End: 2024-04-17
Payer: COMMERCIAL

## 2024-04-17 ENCOUNTER — PATIENT OUTREACH (OUTPATIENT)
Dept: UROLOGY | Facility: CLINIC | Age: 39
End: 2024-04-17
Payer: COMMERCIAL

## 2024-04-17 DIAGNOSIS — R39.89 SUSPECTED UTI: Primary | ICD-10-CM

## 2024-04-17 DIAGNOSIS — R39.89 SUSPECTED UTI: ICD-10-CM

## 2024-04-17 LAB
ALBUMIN UR-MCNC: 30 MG/DL
APPEARANCE UR: CLEAR
BACTERIA #/AREA URNS HPF: ABNORMAL /HPF
BILIRUB UR QL STRIP: NEGATIVE
COLOR UR AUTO: ABNORMAL
GLUCOSE UR STRIP-MCNC: NEGATIVE MG/DL
HGB UR QL STRIP: ABNORMAL
KETONES UR STRIP-MCNC: NEGATIVE MG/DL
LEUKOCYTE ESTERASE UR QL STRIP: ABNORMAL
NITRATE UR QL: NEGATIVE
PH UR STRIP: 6 [PH] (ref 5–7)
RBC URINE: 1 /HPF
SP GR UR STRIP: 1 (ref 1–1.03)
TRANSITIONAL EPI: 1 /HPF
UROBILINOGEN UR STRIP-MCNC: NORMAL MG/DL
WBC CLUMPS #/AREA URNS HPF: PRESENT /HPF
WBC URINE: 55 /HPF

## 2024-04-17 PROCEDURE — 99000 SPECIMEN HANDLING OFFICE-LAB: CPT | Performed by: PATHOLOGY

## 2024-04-17 PROCEDURE — 81001 URINALYSIS AUTO W/SCOPE: CPT | Performed by: PATHOLOGY

## 2024-04-17 PROCEDURE — 87086 URINE CULTURE/COLONY COUNT: CPT | Performed by: STUDENT IN AN ORGANIZED HEALTH CARE EDUCATION/TRAINING PROGRAM

## 2024-04-17 PROCEDURE — 87186 SC STD MICRODIL/AGAR DIL: CPT | Performed by: STUDENT IN AN ORGANIZED HEALTH CARE EDUCATION/TRAINING PROGRAM

## 2024-04-17 NOTE — PROGRESS NOTES
Patient sent in a mySkin message re: his typical UTI symptoms(bladder pain, SOB, urgency) which he verified with a call transferred to me from the call center. UA/UC orders entered.    Thank you,  Dipika Berger RN, BSN Urology Triage

## 2024-04-18 ENCOUNTER — PATIENT OUTREACH (OUTPATIENT)
Dept: UROLOGY | Facility: CLINIC | Age: 39
End: 2024-04-18
Payer: COMMERCIAL

## 2024-04-18 ENCOUNTER — OFFICE VISIT (OUTPATIENT)
Dept: URGENT CARE | Facility: URGENT CARE | Age: 39
End: 2024-04-18
Payer: COMMERCIAL

## 2024-04-18 ENCOUNTER — HOSPITAL ENCOUNTER (EMERGENCY)
Facility: CLINIC | Age: 39
Discharge: HOME | End: 2024-04-18
Attending: INTERNAL MEDICINE | Admitting: INTERNAL MEDICINE
Payer: COMMERCIAL

## 2024-04-18 VITALS
DIASTOLIC BLOOD PRESSURE: 101 MMHG | HEIGHT: 72 IN | SYSTOLIC BLOOD PRESSURE: 191 MMHG | WEIGHT: 296 LBS | HEART RATE: 121 BPM | TEMPERATURE: 99.1 F | BODY MASS INDEX: 40.09 KG/M2 | RESPIRATION RATE: 21 BRPM | OXYGEN SATURATION: 100 %

## 2024-04-18 VITALS
HEIGHT: 72 IN | RESPIRATION RATE: 18 BRPM | SYSTOLIC BLOOD PRESSURE: 156 MMHG | BODY MASS INDEX: 40.09 KG/M2 | TEMPERATURE: 99.3 F | HEART RATE: 81 BPM | OXYGEN SATURATION: 100 % | WEIGHT: 296 LBS | DIASTOLIC BLOOD PRESSURE: 100 MMHG

## 2024-04-18 DIAGNOSIS — R35.0 INCREASED URINARY FREQUENCY: ICD-10-CM

## 2024-04-18 DIAGNOSIS — N39.0 URINARY TRACT INFECTION WITHOUT HEMATURIA, SITE UNSPECIFIED: Primary | ICD-10-CM

## 2024-04-18 DIAGNOSIS — N10 ACUTE PYELONEPHRITIS: ICD-10-CM

## 2024-04-18 DIAGNOSIS — N31.9 NEUROGENIC BLADDER: ICD-10-CM

## 2024-04-18 LAB
ALBUMIN SERPL BCG-MCNC: 4.2 G/DL (ref 3.5–5.2)
ALP SERPL-CCNC: 46 U/L (ref 40–150)
ALT SERPL W P-5'-P-CCNC: 31 U/L (ref 0–70)
ANION GAP SERPL CALCULATED.3IONS-SCNC: 15 MMOL/L (ref 7–15)
ANION GAP SERPL CALCULATED.3IONS-SCNC: 16 MMOL/L (ref 7–15)
AST SERPL W P-5'-P-CCNC: 24 U/L (ref 0–45)
BASE EXCESS BLDV CALC-SCNC: 0 MMOL/L (ref -3–3)
BASOPHILS # BLD AUTO: 0 10E3/UL (ref 0–0.2)
BASOPHILS # BLD AUTO: 0 10E3/UL (ref 0–0.2)
BASOPHILS NFR BLD AUTO: 0 %
BASOPHILS NFR BLD AUTO: 0 %
BILIRUB SERPL-MCNC: 0.8 MG/DL
BUN SERPL-MCNC: 17.8 MG/DL (ref 6–20)
BUN SERPL-MCNC: 18 MG/DL (ref 6–20)
CALCIUM SERPL-MCNC: 10.2 MG/DL (ref 8.6–10)
CALCIUM SERPL-MCNC: 9.7 MG/DL (ref 8.6–10)
CHLORIDE SERPL-SCNC: 99 MMOL/L (ref 98–107)
CHLORIDE SERPL-SCNC: 99 MMOL/L (ref 98–107)
CREAT SERPL-MCNC: 1.43 MG/DL (ref 0.67–1.17)
CREAT SERPL-MCNC: 1.43 MG/DL (ref 0.67–1.17)
CRP SERPL-MCNC: 289.45 MG/L
CRP SERPL-MCNC: 329 MG/L
DEPRECATED HCO3 PLAS-SCNC: 21 MMOL/L (ref 22–29)
DEPRECATED HCO3 PLAS-SCNC: 21 MMOL/L (ref 22–29)
EGFRCR SERPLBLD CKD-EPI 2021: 64 ML/MIN/1.73M2
EGFRCR SERPLBLD CKD-EPI 2021: 64 ML/MIN/1.73M2
EOSINOPHIL # BLD AUTO: 0 10E3/UL (ref 0–0.7)
EOSINOPHIL # BLD AUTO: 0 10E3/UL (ref 0–0.7)
EOSINOPHIL NFR BLD AUTO: 0 %
EOSINOPHIL NFR BLD AUTO: 0 %
ERYTHROCYTE [DISTWIDTH] IN BLOOD BY AUTOMATED COUNT: 12.9 % (ref 10–15)
ERYTHROCYTE [DISTWIDTH] IN BLOOD BY AUTOMATED COUNT: 13 % (ref 10–15)
ERYTHROCYTE [SEDIMENTATION RATE] IN BLOOD BY WESTERGREN METHOD: 19 MM/HR (ref 0–15)
GLUCOSE SERPL-MCNC: 130 MG/DL (ref 70–99)
GLUCOSE SERPL-MCNC: 135 MG/DL (ref 70–99)
HCO3 BLDV-SCNC: 22 MMOL/L (ref 21–28)
HCT VFR BLD AUTO: 44.1 % (ref 40–53)
HCT VFR BLD AUTO: 45.7 % (ref 40–53)
HGB BLD-MCNC: 15.1 G/DL (ref 13.3–17.7)
HGB BLD-MCNC: 15.7 G/DL (ref 13.3–17.7)
HOLD SPECIMEN: NORMAL
HOLD SPECIMEN: NORMAL
IMM GRANULOCYTES # BLD: 0 10E3/UL
IMM GRANULOCYTES # BLD: 0.1 10E3/UL
IMM GRANULOCYTES NFR BLD: 0 %
IMM GRANULOCYTES NFR BLD: 1 %
LACTATE BLD-SCNC: 1.6 MMOL/L
LYMPHOCYTES # BLD AUTO: 0.7 10E3/UL (ref 0.8–5.3)
LYMPHOCYTES # BLD AUTO: 0.8 10E3/UL (ref 0.8–5.3)
LYMPHOCYTES NFR BLD AUTO: 4 %
LYMPHOCYTES NFR BLD AUTO: 4 %
MCH RBC QN AUTO: 29.7 PG (ref 26.5–33)
MCH RBC QN AUTO: 30.1 PG (ref 26.5–33)
MCHC RBC AUTO-ENTMCNC: 34.2 G/DL (ref 31.5–36.5)
MCHC RBC AUTO-ENTMCNC: 34.4 G/DL (ref 31.5–36.5)
MCV RBC AUTO: 87 FL (ref 78–100)
MCV RBC AUTO: 88 FL (ref 78–100)
MONOCYTES # BLD AUTO: 1 10E3/UL (ref 0–1.3)
MONOCYTES # BLD AUTO: 1 10E3/UL (ref 0–1.3)
MONOCYTES NFR BLD AUTO: 5 %
MONOCYTES NFR BLD AUTO: 6 %
NEUTROPHILS # BLD AUTO: 17 10E3/UL (ref 1.6–8.3)
NEUTROPHILS # BLD AUTO: 17.7 10E3/UL (ref 1.6–8.3)
NEUTROPHILS NFR BLD AUTO: 90 %
NEUTROPHILS NFR BLD AUTO: 90 %
NRBC # BLD AUTO: 0 10E3/UL
NRBC BLD AUTO-RTO: 0 /100
PCO2 BLDV: 31 MM HG (ref 40–50)
PH BLDV: 7.46 [PH] (ref 7.32–7.43)
PLATELET # BLD AUTO: 197 10E3/UL (ref 150–450)
PLATELET # BLD AUTO: 207 10E3/UL (ref 150–450)
PO2 BLDV: 14 MM HG (ref 25–47)
POTASSIUM SERPL-SCNC: 4 MMOL/L (ref 3.4–5.3)
POTASSIUM SERPL-SCNC: 4.2 MMOL/L (ref 3.4–5.3)
PROT SERPL-MCNC: 7.9 G/DL (ref 6.4–8.3)
RBC # BLD AUTO: 5.08 10E6/UL (ref 4.4–5.9)
RBC # BLD AUTO: 5.22 10E6/UL (ref 4.4–5.9)
SAO2 % BLDV: 20 % (ref 70–75)
SODIUM SERPL-SCNC: 135 MMOL/L (ref 135–145)
SODIUM SERPL-SCNC: 136 MMOL/L (ref 135–145)
WBC # BLD AUTO: 18.9 10E3/UL (ref 4–11)
WBC # BLD AUTO: 19.5 10E3/UL (ref 4–11)

## 2024-04-18 PROCEDURE — 36415 COLL VENOUS BLD VENIPUNCTURE: CPT | Performed by: INTERNAL MEDICINE

## 2024-04-18 PROCEDURE — 250N000011 HC RX IP 250 OP 636: Performed by: INTERNAL MEDICINE

## 2024-04-18 PROCEDURE — 80053 COMPREHEN METABOLIC PANEL: CPT | Performed by: FAMILY MEDICINE

## 2024-04-18 PROCEDURE — 99215 OFFICE O/P EST HI 40 MIN: CPT | Mod: 25 | Performed by: FAMILY MEDICINE

## 2024-04-18 PROCEDURE — 85025 COMPLETE CBC W/AUTO DIFF WBC: CPT | Performed by: INTERNAL MEDICINE

## 2024-04-18 PROCEDURE — 96365 THER/PROPH/DIAG IV INF INIT: CPT | Performed by: INTERNAL MEDICINE

## 2024-04-18 PROCEDURE — 36415 COLL VENOUS BLD VENIPUNCTURE: CPT | Performed by: FAMILY MEDICINE

## 2024-04-18 PROCEDURE — 86140 C-REACTIVE PROTEIN: CPT | Performed by: FAMILY MEDICINE

## 2024-04-18 PROCEDURE — 87040 BLOOD CULTURE FOR BACTERIA: CPT | Performed by: INTERNAL MEDICINE

## 2024-04-18 PROCEDURE — 86140 C-REACTIVE PROTEIN: CPT | Performed by: INTERNAL MEDICINE

## 2024-04-18 PROCEDURE — 96366 THER/PROPH/DIAG IV INF ADDON: CPT | Performed by: INTERNAL MEDICINE

## 2024-04-18 PROCEDURE — 99285 EMERGENCY DEPT VISIT HI MDM: CPT | Performed by: INTERNAL MEDICINE

## 2024-04-18 PROCEDURE — 85652 RBC SED RATE AUTOMATED: CPT | Performed by: INTERNAL MEDICINE

## 2024-04-18 PROCEDURE — 82803 BLOOD GASES ANY COMBINATION: CPT

## 2024-04-18 PROCEDURE — 96372 THER/PROPH/DIAG INJ SC/IM: CPT | Performed by: FAMILY MEDICINE

## 2024-04-18 PROCEDURE — 99284 EMERGENCY DEPT VISIT MOD MDM: CPT | Mod: 25 | Performed by: INTERNAL MEDICINE

## 2024-04-18 PROCEDURE — 99417 PROLNG OP E/M EACH 15 MIN: CPT | Performed by: FAMILY MEDICINE

## 2024-04-18 PROCEDURE — 85025 COMPLETE CBC W/AUTO DIFF WBC: CPT | Performed by: FAMILY MEDICINE

## 2024-04-18 PROCEDURE — 258N000003 HC RX IP 258 OP 636: Performed by: INTERNAL MEDICINE

## 2024-04-18 RX ORDER — CEFTRIAXONE SODIUM 1 G
1 VIAL (EA) INJECTION ONCE
Status: COMPLETED | OUTPATIENT
Start: 2024-04-18 | End: 2024-04-18

## 2024-04-18 RX ORDER — CEFDINIR 300 MG/1
300 CAPSULE ORAL 2 TIMES DAILY
Qty: 14 CAPSULE | Refills: 0 | Status: SHIPPED | OUTPATIENT
Start: 2024-04-18 | End: 2024-04-25

## 2024-04-18 RX ADMIN — VANCOMYCIN HYDROCHLORIDE 2500 MG: 1 INJECTION, POWDER, LYOPHILIZED, FOR SOLUTION INTRAVENOUS at 16:33

## 2024-04-18 RX ADMIN — Medication 1 G: at 12:02

## 2024-04-18 ASSESSMENT — COLUMBIA-SUICIDE SEVERITY RATING SCALE - C-SSRS
6. HAVE YOU EVER DONE ANYTHING, STARTED TO DO ANYTHING, OR PREPARED TO DO ANYTHING TO END YOUR LIFE?: NO
1. IN THE PAST MONTH, HAVE YOU WISHED YOU WERE DEAD OR WISHED YOU COULD GO TO SLEEP AND NOT WAKE UP?: NO
2. HAVE YOU ACTUALLY HAD ANY THOUGHTS OF KILLING YOURSELF IN THE PAST MONTH?: NO

## 2024-04-18 ASSESSMENT — ACTIVITIES OF DAILY LIVING (ADL)
ADLS_ACUITY_SCORE: 35
ADLS_ACUITY_SCORE: 33
ADLS_ACUITY_SCORE: 35
ADLS_ACUITY_SCORE: 35

## 2024-04-18 NOTE — ED TRIAGE NOTES
Pt presents ambulatory to triage for positive blood cultures.  Was seen in clinic for UTI symptoms and was referred here when labs came back.  Pt states he just wants abx but clinic stated the would 'call the police' if he did not present to ED for treatment.      Triage Assessment (Adult)       Row Name 04/18/24 1434          Triage Assessment    Airway WDL WDL        Respiratory WDL    Respiratory WDL WDL        Skin Circulation/Temperature WDL    Skin Circulation/Temperature WDL WDL        Cardiac WDL    Cardiac WDL WDL        Peripheral/Neurovascular WDL    Peripheral Neurovascular WDL WDL        Cognitive/Neuro/Behavioral WDL    Cognitive/Neuro/Behavioral WDL WDL

## 2024-04-18 NOTE — PROGRESS NOTES
Assessment & Plan     Increased urinary frequency  - CRP, inflammation  - CBC with platelets and differential  - Basic metabolic panel  (Ca, Cl, CO2, Creat, Gluc, K, Na, BUN)  - cefTRIAXone (ROCEPHIN) in lidocaine 1% (PF) for IM administration 1 g  - INJECTION INTRAMUSCULAR OR SUB-Q  - CRP, inflammation  - CBC with platelets and differential  - Basic metabolic panel  (Ca, Cl, CO2, Creat, Gluc, K, Na, BUN)    Urinary tract infection without hematuria, site unspecified  - cefdinir (OMNICEF) 300 MG capsule  Dispense: 14 capsule; Refill: 0     As urine was provided yesterday a final report /susceptibility is still pending. Faecalis initial prelim but could very well be contaminate given his body habitus and incidence of UTI from this organism <10% of cases, previous urine culture showing pan-sensitive e.coli.  We came to a compromise to allow us to draw blood work to estimate severity of his illness , he hesitantly agreed along with agreeing to be available by phone.    CBC returned elevated suggesting possible pyelonephritis. With his vomiting resolved and showing normal mentation along with appearing well hydrated and maintaining adequate blood pressure I feel the likelihood of sepsis at the current time is low but it is quite likely he is showing early signs of a kidney infection.      I offered to arrange for him be evaluated at acute diagnostic services for rapid blood testing/imaging but he does not have a car, he does not feel a ride share such as uber is a practical option at this time as he uses public transit; essentially he declines.     When considering organisms which cause pyelonephritis enterococcus is rarely a cause whereas in cystitis it is estimated to cause 5-10% of infections. Chris consents to a 1 gram dose of Rocephin in the clinic additionally he was given a course of cefdinir.He understands if symptoms worsen to seek medical attention right away or call 911.       Addendum at 1400  CRP returning  at a markedly high level I called Chris advising that he go to the ED right away to be evaluated  I do have concerns that there is potential for bacteremia or even abscess given the abnormally high CRP which was discussed with patient , concerning when considering his past medical history for bacteremia. His reports of intermittent of SOB (although non-labored in clinic) is also a concern.  He has also has had episodes of vomiting over the last few days although denies episodes today. There was some frustration in our phone conversation as he could not assure to me that he would go to the ED to be evaluated stating ' Ill consider it' or 'handle it later' and also stating '..Ill figure it out later'. Confirmed with staff that there was some question in regards to his full comprehension of understanding matters although he does not appear lethargic or disoriented.     Eventually, Chris verbally agrees to go to [place] ED. He states he will be able to find a ride thru friends/family. If unable to find assistance he will call 911 or a  for help. We discussed that if I have not seen him arrive at the ED within next 2 hours that I would call an officer to do a wellness check on him for his own safety as without testing/monitoring/treatment that there is a possibility he may experience irreversible harm.     See AVS summary for additional recommendations reviewed with patient during this visit.     75 minutes spent on the date of the encounter doing chart review, history and exam, documentation and further activities per the note.       Tristan Valdez MD   [facility]    Subjective     Chris is a 39 year old male who presents to clinic today for the following health issues:  Chief Complaint   Patient presents with    Urinary Problem     X2 days of frequent need to urinate, odor     Shortness of Breath     Shortness of breath     Vomiting     X2 days of vomiting     Urgent Care     Had urinate  tests done yesterday   Pain in hips     HPI    Patient is here with frustration as he has not been able to get antibiotic he has since feels urine collection was done yesterday.  He reports that he has had nausea and minor vomiting over the last couple days.  He denies any recent history of kidney infections.  He has a previous history of hospitalization for bacteremia.  He reports that his needs for emptying his bladder has been increased and is noticing an increased odor.  He denies any lightheadedness or dizziness.  No recorded fevers.    Patient was able to repeat his name, location, purpose of his visit.     He reports uncomplicated UTI infections does cause him to feel' SOB' without lightheaded.     Last vomiting episode was 2 days ago.   Patient Active Problem List    Diagnosis Date Noted    E coli bacteremia 11/10/2022     Priority: Medium    Pyelonephritis 11/07/2022     Priority: Medium    Intractable epilepsy without status epilepticus, unspecified epilepsy type (H) 05/11/2018     Priority: Medium    Exstrophy of bladder sequence 06/29/2015     Priority: Medium     s/p bladder augmentation, bladder neck reconstruction (Mikey Glez) and creation of Mitrofanoff channel in 2002. He is not wheel chair bound. He uses 14F cath for CIC.       Other hydronephrosis 06/02/2015     Priority: Medium    Bladder stone 06/02/2015     Priority: Medium    Neurogenic bladder 10/30/2012     Priority: Medium     Problem list name updated by automated process. Provider to review      Attention deficit hyperactivity disorder (ADHD) 11/06/2006     Priority: Medium     Problem list name updated by automated process. Provider to review         Current Outpatient Medications   Medication Sig Dispense Refill    cefdinir (OMNICEF) 300 MG capsule Take 1 capsule (300 mg) by mouth 2 times daily for 7 days 14 capsule 0    Cranberry 500 MG CAPS Take 650 mg by mouth      levETIRAcetam (KEPPRA XR) 500 MG 24 hr tablet Take 3,000  mg by mouth      OXcarbazepine ER 24hour (OXTELLAR XR) 600 MG 24 hr tablet 1.5 tablet (900 mg)       No current facility-administered medications for this visit.         Objective    BP (!) 191/101   Pulse (!) 121   Temp 99.1  F (37.3  C) (Oral)   Resp 21   Ht 1.829 m (6')   Wt 134.3 kg (296 lb)   SpO2 100%   BMI 40.14 kg/m    Physical Exam     GEN: non-diaphoretic, able to state name/date/location/purpose of visit  CV: HDS, tachycardic, sinus rhythm  Gait: normal  Pulm: non-labored work of breathing    Results for orders placed or performed in visit on 04/18/24   CRP, inflammation     Status: Abnormal   Result Value Ref Range    CRP Inflammation 289.45 (H) <5.00 mg/L   Basic metabolic panel  (Ca, Cl, CO2, Creat, Gluc, K, Na, BUN)     Status: Abnormal   Result Value Ref Range    Sodium 135 135 - 145 mmol/L    Potassium 4.2 3.4 - 5.3 mmol/L    Chloride 99 98 - 107 mmol/L    Carbon Dioxide (CO2) 21 (L) 22 - 29 mmol/L    Anion Gap 15 7 - 15 mmol/L    Urea Nitrogen 18.0 6.0 - 20.0 mg/dL    Creatinine 1.43 (H) 0.67 - 1.17 mg/dL    GFR Estimate 64 >60 mL/min/1.73m2    Calcium 9.7 8.6 - 10.0 mg/dL    Glucose 135 (H) 70 - 99 mg/dL   CBC with platelets and differential     Status: Abnormal   Result Value Ref Range    WBC Count 18.9 (H) 4.0 - 11.0 10e3/uL    RBC Count 5.08 4.40 - 5.90 10e6/uL    Hemoglobin 15.1 13.3 - 17.7 g/dL    Hematocrit 44.1 40.0 - 53.0 %    MCV 87 78 - 100 fL    MCH 29.7 26.5 - 33.0 pg    MCHC 34.2 31.5 - 36.5 g/dL    RDW 13.0 10.0 - 15.0 %    Platelet Count 207 150 - 450 10e3/uL    % Neutrophils 90 %    % Lymphocytes 4 %    % Monocytes 6 %    % Eosinophils 0 %    % Basophils 0 %    % Immature Granulocytes 0 %    Absolute Neutrophils 17.0 (H) 1.6 - 8.3 10e3/uL    Absolute Lymphocytes 0.8 0.8 - 5.3 10e3/uL    Absolute Monocytes 1.0 0.0 - 1.3 10e3/uL    Absolute Eosinophils 0.0 0.0 - 0.7 10e3/uL    Absolute Basophils 0.0 0.0 - 0.2 10e3/uL    Absolute Immature Granulocytes 0.0 <=0.4 10e3/uL   CBC  with platelets and differential     Status: Abnormal    Narrative    The following orders were created for panel order CBC with platelets and differential.  Procedure                               Abnormality         Status                     ---------                               -----------         ------                     CBC with platelets and d...[[id number]]  Abnormal            Final result                 Please view results for these tests on the individual orders.             The use of Dragon/PowerMic dictation services may have been used to construct the content in this note; any grammatical or spelling errors are non-intentional. Please contact the author of this note directly if you are in need of any clarification.

## 2024-04-18 NOTE — PROGRESS NOTES
"Spoke with patient after call forwarded from call center. Patient is wondering \"why no antibiotic has been ordered\" for potential UTI. Let patient know that his UA is inconclusive for infection as the nitrites are negative and that Dr Mckeon reviewed the results this morning.  Let him know that we will have to wait for the culture results to verify.  He states he \"gets SOB when I have an infection\". Instructed him to go to a UC or ED if his SOB gets worse. He is reluctant to do this, but voices understanding.    Thank you,  Dipika Berger RN, BSN Urology Triage    "

## 2024-04-18 NOTE — PATIENT INSTRUCTIONS
Cefdinir antibiotic twice a day for 1 week      Drink 120 ounces of water/fluids a day to keep hydrated      If you become dizzy/lightheaded or if your symptoms worsen please go to the [facility] emergency room right away      Our team will call you if your blood work is abnormal please be available by phone      Please be available by phone as we wait for the results of your urine culture and your blood work

## 2024-04-18 NOTE — PHARMACY-VANCOMYCIN DOSING SERVICE
Pharmacy Vancomycin Initial Note  Date of Service 2024  Patient's  1985  39 year old, male    Indication: Urinary Tract Infection    Current estimated CrCl = Estimated Creatinine Clearance: 98.4 mL/min (A) (based on SCr of 1.43 mg/dL (H)).    Creatinine for last 3 days  2024: 12:15 PM Creatinine 1.43 mg/dL    Recent Vancomycin Level(s) for last 3 days  No results found for requested labs within last 3 days.      Vancomycin IV Administrations (past 72 hours)        No vancomycin orders with administrations in past 72 hours.                    Nephrotoxins and other renal medications (From now, onward)      Start     Dose/Rate Route Frequency Ordered Stop    24 1000  vancomycin (VANCOCIN) 1,500 mg in 0.9% NaCl 250 mL intermittent infusion         1,500 mg  over 90 Minutes Intravenous EVERY 18 HOURS 24 1551      24 1500  vancomycin (VANCOCIN) 2,500 mg in sodium chloride 0.9 % 500 mL intermittent infusion         2,500 mg  over 120 Minutes Intravenous ONCE 24 1458              Contrast Orders - past 72 hours (72h ago, onward)      None            [employer] Prediction of Planned Initial Vancomycin Regimen    Loading dose: 2500 mg at 00:00 2024.  Regimen: 1500 mg IV every 18 hours.  Start time: 18:00 on 2024  Exposure target: AUC24 (range)400-600 mg/L.hr   AUC24,ss: 539 mg/L.hr  Probability of AUC24 > 400: 71 %  Ctrough,ss: 13.5 mg/L  Probability of Ctrough,ss > 20: 33 %  Probability of nephrotoxicity (Lodise NU ): 9 %          Plan:  Start vancomycin at 1500 mg IV q18h.   Vancomycin monitoring method: AUC  Vancomycin therapeutic monitoring goal: 400-600 mg*h/L  Pharmacy will check vancomycin levels as appropriate in 1-3 Days.    Serum creatinine levels will be ordered daily for the first week of therapy and at least twice weekly for subsequent weeks.      Torie Doe [facility]

## 2024-04-19 LAB — BACTERIA UR CULT: ABNORMAL

## 2024-04-23 LAB
BACTERIA BLD CULT: NO GROWTH
BACTERIA BLD CULT: NO GROWTH

## 2024-05-07 ENCOUNTER — DOCUMENTATION ONLY (OUTPATIENT)
Dept: UROLOGY | Facility: CLINIC | Age: 39
End: 2024-05-07
Payer: COMMERCIAL

## 2024-05-07 NOTE — OR NURSING
PNDS met, po per I&O sheet. Pt dressed, up in recliner and transported to Phase 2.  Patient upset with news that no stone was found.  Will call Dr. Ibrahim and have him relate findings of surgery to patient.   VTE ppx: known PE, on eliquis   Diet: Dash/TLC  GI ppx: home ppi  Dispo: [facility]

## 2024-05-07 NOTE — PROGRESS NOTES
Type of Form Received: Order (self-cath)    Form Received (Date) 5/7/24   Form Filled out Yes, date 5/7/24   Placed in provider folder Yes       Received Completed forms Yes   Faxed Forms Faxed To: Mickie  Fax Number: [phone]   Sent to HIM (Date) 5/14/24

## 2024-05-13 ENCOUNTER — MEDICAL CORRESPONDENCE (OUTPATIENT)
Dept: HEALTH INFORMATION MANAGEMENT | Facility: CLINIC | Age: 39
End: 2024-05-13
Payer: COMMERCIAL

## 2024-06-12 ENCOUNTER — DOCUMENTATION ONLY (OUTPATIENT)
Dept: UROLOGY | Facility: CLINIC | Age: 39
End: 2024-06-12
Payer: COMMERCIAL

## 2024-06-12 NOTE — PROGRESS NOTES
Type of Form Received: Order (self-cath)    Form Received (Date) 6/12/24   Form Filled out Yes, date 6/12/24   Placed in provider folder Yes       Received Completed forms Yes   Faxed Forms Faxed To: [facility]  Fax Number: [phone]   Sent to HIM (Date) 6/17/24

## 2024-06-17 ENCOUNTER — MEDICAL CORRESPONDENCE (OUTPATIENT)
Dept: HEALTH INFORMATION MANAGEMENT | Facility: CLINIC | Age: 39
End: 2024-06-17
Payer: COMMERCIAL

## 2024-07-03 ENCOUNTER — TELEPHONE (OUTPATIENT)
Dept: UROLOGY | Facility: CLINIC | Age: 39
End: 2024-07-03
Payer: COMMERCIAL

## 2024-07-03 NOTE — TELEPHONE ENCOUNTER
Patient confirmed scheduled appointment:  Date: 10/14  Time: 2:15  Visit type: Cysto (rescheduled)  Provider: Jose Raul  Location: [facility]  Testing/imaging: NA  Additional notes: NA

## 2024-08-30 ENCOUNTER — HOSPITAL ENCOUNTER (EMERGENCY)
Facility: CLINIC | Age: 39
Discharge: HOME | End: 2024-08-30
Attending: EMERGENCY MEDICINE | Admitting: EMERGENCY MEDICINE
Payer: COMMERCIAL

## 2024-08-30 VITALS
RESPIRATION RATE: 16 BRPM | BODY MASS INDEX: 40.27 KG/M2 | WEIGHT: 297.3 LBS | HEIGHT: 72 IN | DIASTOLIC BLOOD PRESSURE: 76 MMHG | TEMPERATURE: 99.4 F | SYSTOLIC BLOOD PRESSURE: 110 MMHG | HEART RATE: 83 BPM | OXYGEN SATURATION: 96 %

## 2024-08-30 DIAGNOSIS — N39.0 URINARY TRACT INFECTION WITHOUT HEMATURIA, SITE UNSPECIFIED: Primary | ICD-10-CM

## 2024-08-30 DIAGNOSIS — N39.0 COMPLICATED UTI (URINARY TRACT INFECTION): ICD-10-CM

## 2024-08-30 LAB
ALBUMIN SERPL BCG-MCNC: 4.1 G/DL (ref 3.5–5.2)
ALBUMIN UR-MCNC: 300 MG/DL
ALP SERPL-CCNC: 46 U/L (ref 40–150)
ALT SERPL W P-5'-P-CCNC: 21 U/L (ref 0–70)
ANION GAP SERPL CALCULATED.3IONS-SCNC: 13 MMOL/L (ref 7–15)
APPEARANCE UR: ABNORMAL
AST SERPL W P-5'-P-CCNC: 18 U/L (ref 0–45)
BACTERIA #/AREA URNS HPF: ABNORMAL /HPF
BASOPHILS # BLD AUTO: 0 10E3/UL (ref 0–0.2)
BASOPHILS NFR BLD AUTO: 0 %
BILIRUB SERPL-MCNC: 0.7 MG/DL
BILIRUB UR QL STRIP: NEGATIVE
BUN SERPL-MCNC: 24.5 MG/DL (ref 6–20)
CALCIUM SERPL-MCNC: 9.7 MG/DL (ref 8.8–10.4)
CHLORIDE SERPL-SCNC: 100 MMOL/L (ref 98–107)
COLOR UR AUTO: ABNORMAL
CREAT SERPL-MCNC: 1.68 MG/DL (ref 0.67–1.17)
EGFRCR SERPLBLD CKD-EPI 2021: 53 ML/MIN/1.73M2
EOSINOPHIL # BLD AUTO: 0 10E3/UL (ref 0–0.7)
EOSINOPHIL NFR BLD AUTO: 0 %
ERYTHROCYTE [DISTWIDTH] IN BLOOD BY AUTOMATED COUNT: 12.6 % (ref 10–15)
GLUCOSE SERPL-MCNC: 118 MG/DL (ref 70–99)
GLUCOSE UR STRIP-MCNC: NEGATIVE MG/DL
HCO3 SERPL-SCNC: 20 MMOL/L (ref 22–29)
HCT VFR BLD AUTO: 43.3 % (ref 40–53)
HGB BLD-MCNC: 14.9 G/DL (ref 13.3–17.7)
HGB UR QL STRIP: ABNORMAL
HOLD SPECIMEN: NORMAL
IMM GRANULOCYTES # BLD: 0.1 10E3/UL
IMM GRANULOCYTES NFR BLD: 1 %
KETONES UR STRIP-MCNC: NEGATIVE MG/DL
LACTATE SERPL-SCNC: 1.2 MMOL/L (ref 0.7–2)
LEUKOCYTE ESTERASE UR QL STRIP: ABNORMAL
LYMPHOCYTES # BLD AUTO: 1 10E3/UL (ref 0.8–5.3)
LYMPHOCYTES NFR BLD AUTO: 6 %
MCH RBC QN AUTO: 29.9 PG (ref 26.5–33)
MCHC RBC AUTO-ENTMCNC: 34.4 G/DL (ref 31.5–36.5)
MCV RBC AUTO: 87 FL (ref 78–100)
MONOCYTES # BLD AUTO: 1.6 10E3/UL (ref 0–1.3)
MONOCYTES NFR BLD AUTO: 10 %
MUCOUS THREADS #/AREA URNS LPF: PRESENT /LPF
NEUTROPHILS # BLD AUTO: 12.6 10E3/UL (ref 1.6–8.3)
NEUTROPHILS NFR BLD AUTO: 83 %
NITRATE UR QL: NEGATIVE
NRBC # BLD AUTO: 0 10E3/UL
NRBC BLD AUTO-RTO: 0 /100
PH UR STRIP: 7 [PH] (ref 5–7)
PLATELET # BLD AUTO: 171 10E3/UL (ref 150–450)
POTASSIUM SERPL-SCNC: 3.6 MMOL/L (ref 3.4–5.3)
PROT SERPL-MCNC: 7.8 G/DL (ref 6.4–8.3)
RBC # BLD AUTO: 4.99 10E6/UL (ref 4.4–5.9)
RBC URINE: 15 /HPF
SODIUM SERPL-SCNC: 133 MMOL/L (ref 135–145)
SP GR UR STRIP: 1.01 (ref 1–1.03)
UROBILINOGEN UR STRIP-MCNC: NORMAL MG/DL
WBC # BLD AUTO: 15.2 10E3/UL (ref 4–11)
WBC CLUMPS #/AREA URNS HPF: PRESENT /HPF
WBC URINE: >182 /HPF

## 2024-08-30 PROCEDURE — 87186 SC STD MICRODIL/AGAR DIL: CPT | Performed by: EMERGENCY MEDICINE

## 2024-08-30 PROCEDURE — 99284 EMERGENCY DEPT VISIT MOD MDM: CPT | Mod: 25 | Performed by: EMERGENCY MEDICINE

## 2024-08-30 PROCEDURE — 83605 ASSAY OF LACTIC ACID: CPT | Performed by: EMERGENCY MEDICINE

## 2024-08-30 PROCEDURE — 87086 URINE CULTURE/COLONY COUNT: CPT | Performed by: EMERGENCY MEDICINE

## 2024-08-30 PROCEDURE — 250N000013 HC RX MED GY IP 250 OP 250 PS 637: Performed by: EMERGENCY MEDICINE

## 2024-08-30 PROCEDURE — 36415 COLL VENOUS BLD VENIPUNCTURE: CPT | Performed by: EMERGENCY MEDICINE

## 2024-08-30 PROCEDURE — 80053 COMPREHEN METABOLIC PANEL: CPT | Performed by: EMERGENCY MEDICINE

## 2024-08-30 PROCEDURE — 85004 AUTOMATED DIFF WBC COUNT: CPT | Performed by: EMERGENCY MEDICINE

## 2024-08-30 PROCEDURE — 258N000003 HC RX IP 258 OP 636: Performed by: EMERGENCY MEDICINE

## 2024-08-30 PROCEDURE — 96361 HYDRATE IV INFUSION ADD-ON: CPT | Performed by: EMERGENCY MEDICINE

## 2024-08-30 PROCEDURE — 81001 URINALYSIS AUTO W/SCOPE: CPT | Performed by: EMERGENCY MEDICINE

## 2024-08-30 PROCEDURE — 250N000011 HC RX IP 250 OP 636: Performed by: EMERGENCY MEDICINE

## 2024-08-30 PROCEDURE — 99284 EMERGENCY DEPT VISIT MOD MDM: CPT | Performed by: EMERGENCY MEDICINE

## 2024-08-30 PROCEDURE — 96365 THER/PROPH/DIAG IV INF INIT: CPT | Performed by: EMERGENCY MEDICINE

## 2024-08-30 RX ORDER — ACETAMINOPHEN 500 MG
1000 TABLET ORAL ONCE
Status: COMPLETED | OUTPATIENT
Start: 2024-08-30 | End: 2024-08-30

## 2024-08-30 RX ORDER — IBUPROFEN 600 MG/1
600 TABLET, FILM COATED ORAL ONCE
Status: COMPLETED | OUTPATIENT
Start: 2024-08-30 | End: 2024-08-30

## 2024-08-30 RX ORDER — CIPROFLOXACIN 500 MG/1
500 TABLET, FILM COATED ORAL 2 TIMES DAILY
Qty: 14 TABLET | Refills: 0 | Status: SHIPPED | OUTPATIENT
Start: 2024-08-30 | End: 2024-09-06

## 2024-08-30 RX ORDER — CIPROFLOXACIN 2 MG/ML
400 INJECTION, SOLUTION INTRAVENOUS ONCE
Status: COMPLETED | OUTPATIENT
Start: 2024-08-30 | End: 2024-08-30

## 2024-08-30 RX ADMIN — ACETAMINOPHEN 1000 MG: 500 TABLET ORAL at 09:35

## 2024-08-30 RX ADMIN — CIPROFLOXACIN 400 MG: 2 INJECTION, SOLUTION INTRAVENOUS at 11:17

## 2024-08-30 RX ADMIN — SODIUM CHLORIDE 1000 ML: 9 INJECTION, SOLUTION INTRAVENOUS at 09:35

## 2024-08-30 RX ADMIN — IBUPROFEN 600 MG: 600 TABLET, FILM COATED ORAL at 09:35

## 2024-08-30 ASSESSMENT — ACTIVITIES OF DAILY LIVING (ADL)
ADLS_ACUITY_SCORE: 35

## 2024-08-30 ASSESSMENT — COLUMBIA-SUICIDE SEVERITY RATING SCALE - C-SSRS
6. HAVE YOU EVER DONE ANYTHING, STARTED TO DO ANYTHING, OR PREPARED TO DO ANYTHING TO END YOUR LIFE?: NO
2. HAVE YOU ACTUALLY HAD ANY THOUGHTS OF KILLING YOURSELF IN THE PAST MONTH?: NO
1. IN THE PAST MONTH, HAVE YOU WISHED YOU WERE DEAD OR WISHED YOU COULD GO TO SLEEP AND NOT WAKE UP?: NO

## 2024-08-30 NOTE — ED PROVIDER NOTES
"    [place] EMERGENCY DEPARTMENT ([facility])    8/30/24       ED PROVIDER NOTE   ED 21  History     Chief Complaint   Patient presents with    Urinary Frequency     The history is provided by the patient and medical records.     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  Patient states that he first noted lower back pain at about 1100 yesterday, which is a symptom of his UTIs.  Patient has also noted that he has had to \"self-cath\" more frequently this week.     Patient had an episode of nausea and vomiting this morning.  Reports fever of 100.3 at triage today.  Denies cough URI or other symptoms.  No abdominal pain no flank pain.    Epic records reviewed. Patient's last UTI was in April 2024, grew out pan susceptible Enterococcus faecalis.     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    Learning disability     Neurogenic bladder, NOS 10/30/2012    Obesity (BMI 35.0-39.9 without comorbidity)     Other hydronephrosis 06/02/2015    Unspecified epilepsy with intractable epilepsy 10/22/2013     Past Surgical History:   Procedure Laterality Date    BLADDER AUGMENTATION      bladder neck reconstruction      Mikey Mayra De La Cruzdelilah    CYSTOSCOPY N/A 07/10/2018    Procedure: CYSTOSCOPY;  CYSTOSCOPY  OF NEOBLADDER  ;  Surgeon: Mario Ibrahim MD;  Location:  OR    CYSTOSCOPY VIA MITROFANOFF N/A 1/29/2021    Procedure: CYSTOSCOPY, VIA MITROFANOFF;  Surgeon: Joel Blunt MD;  Location: [facility] OR    LASER HOLMIUM " LITHOTRIPSY BLADDER N/A 07/24/2015    Procedure: LASER HOLMIUM LITHOTRIPSY BLADDER;  Surgeon: Joel Blunt MD;  Location: [facility] OR    LASER HOLMIUM LITHOTRIPSY BLADDER N/A 07/10/2018    Procedure: LASER HOLMIUM LITHOTRIPSY BLADDER;;  Surgeon: Mario Ibrahim MD;  Location: [facility] OR    LEFT TEMPORAL CRANIOTOMY FOR ANTERIOR TEMPORAL LOBECTOMY  10/01/2018    MITROFANOFF PROCEDURE (APPENDIX CONDUIT)  01/01/2002    PERCUTANEOUS CYSTOLITHOTOMY N/A 9/9/2022    Procedure: CYSTOLITHOTOMY, WITH LASER STANDBY;  Surgeon: Manjit Nicolas MD;  Location: [facility] OR    [facility] REMOVAL OF KIDNEY STONE       amoxicillin-clavulanate (AUGMENTIN) 875-125 MG tablet  ciprofloxacin (CIPRO) 500 MG tablet  Cranberry 500 MG CAPS  levETIRAcetam (KEPPRA XR) 500 MG 24 hr tablet  OXcarbazepine ER 24hour (OXTELLAR XR) 600 MG 24 hr tablet      Allergies   Allergen Reactions    Ondansetron      Hallucinations    Sulfa Antibiotics      unknown     Family History  Family History   Problem Relation Age of Onset    Seizure Disorder Mother     Breast Cancer Mother     Cancer Father      Social History   Social History     Tobacco Use    Smoking status: Never    Smokeless tobacco: Never   Vaping Use    Vaping status: Never Used   Substance Use Topics    Alcohol use: Yes     Comment: Alcoholic beverage once or twice per week.    Drug use: No      A medically appropriate review of systems was performed with pertinent positives and negatives noted in the HPI, and all other systems negative.    Physical Exam   BP: (!) 172/61  Pulse: 61  Temp: 100.3  F (37.9  C)  Resp: 22  Height: 182.9 cm (6')  Weight: 134.9 kg (297 lb 4.8 oz)  SpO2: 100 %    Physical Exam  General: awake, alert, NAD  Head: normal cephalic  HEENT: pupils equal, conjugate gaze intact  Neck: Supple  CV: regular rate and rhythm without murmur  Lungs: clear to auscultation  Abd: soft, non-tender, no guarding, no peritoneal signs  Back: No flank tenderness.  EXT: lower extremities without  swelling or edema  Neuro: awake, answers questions appropriately. No focal deficits noted      ED Course, Procedures, & Data      Procedures         Results for orders placed or performed during the hospital encounter of 08/30/24   [facility] Draw     Status: None    Narrative    The following orders were created for panel order [facility] Draw.  Procedure                               Abnormality         Status                     ---------                               -----------         ------                     Extra Green Top (Lithium...[[id number]]                      Final result               Extra Purple Top Tube[[id number]]                            Final result               Extra Green Top (Lithium...[[id number]]                      Final result                 Please view results for these tests on the individual orders.   UA with Microscopic reflex to Culture     Status: Abnormal    Specimen: Urine, Catheter   Result Value Ref Range    Color Urine Orange (A) Colorless, Straw, Light Yellow, Yellow    Appearance Urine Slightly Cloudy (A) Clear    Glucose Urine Negative Negative mg/dL    Bilirubin Urine Negative Negative    Ketones Urine Negative Negative mg/dL    Specific Gravity Urine 1.010 1.003 - 1.035    Blood Urine Small (A) Negative    pH Urine 7.0 5.0 - 7.0    Protein Albumin Urine 300 (A) Negative mg/dL    Urobilinogen Urine Normal Normal, 2.0 mg/dL    Nitrite Urine Negative Negative    Leukocyte Esterase Urine Large (A) Negative    Bacteria Urine Many (A) None Seen /HPF    WBC Clumps Urine Present (A) None Seen /HPF    Mucus Urine Present (A) None Seen /LPF    RBC Urine 15 (H) <=2 /HPF    WBC Urine >182 (H) <=5 /HPF    Narrative    Urine Culture ordered based on laboratory criteria   Extra Green Top (Lithium Heparin) Tube     Status: None   Result Value Ref Range    Hold Specimen JIC    Extra Purple Top Tube     Status: None   Result Value Ref Range    Hold Specimen JIC    Extra Green Top (Lithium  Heparin) ON ICE     Status: None   Result Value Ref Range    Hold Specimen [facility]    Comprehensive metabolic panel     Status: Abnormal   Result Value Ref Range    Sodium 133 (L) 135 - 145 mmol/L    Potassium 3.6 3.4 - 5.3 mmol/L    Carbon Dioxide (CO2) 20 (L) 22 - 29 mmol/L    Anion Gap 13 7 - 15 mmol/L    Urea Nitrogen 24.5 (H) 6.0 - 20.0 mg/dL    Creatinine 1.68 (H) 0.67 - 1.17 mg/dL    GFR Estimate 53 (L) >60 mL/min/1.73m2    Calcium 9.7 8.8 - 10.4 mg/dL    Chloride 100 98 - 107 mmol/L    Glucose 118 (H) 70 - 99 mg/dL    Alkaline Phosphatase 46 40 - 150 U/L    AST 18 0 - 45 U/L    ALT 21 0 - 70 U/L    Protein Total 7.8 6.4 - 8.3 g/dL    Albumin 4.1 3.5 - 5.2 g/dL    Bilirubin Total 0.7 <=1.2 mg/dL   Lactic acid whole blood with 1x repeat in 2 hr when >2     Status: Normal   Result Value Ref Range    Lactic Acid, Initial 1.2 0.7 - 2.0 mmol/L   CBC with platelets and differential     Status: Abnormal   Result Value Ref Range    WBC Count 15.2 (H) 4.0 - 11.0 10e3/uL    RBC Count 4.99 4.40 - 5.90 10e6/uL    Hemoglobin 14.9 13.3 - 17.7 g/dL    Hematocrit 43.3 40.0 - 53.0 %    MCV 87 78 - 100 fL    MCH 29.9 26.5 - 33.0 pg    MCHC 34.4 31.5 - 36.5 g/dL    RDW 12.6 10.0 - 15.0 %    Platelet Count 171 150 - 450 10e3/uL    % Neutrophils 83 %    % Lymphocytes 6 %    % Monocytes 10 %    % Eosinophils 0 %    % Basophils 0 %    % Immature Granulocytes 1 %    NRBCs per 100 WBC 0 <1 /100    Absolute Neutrophils 12.6 (H) 1.6 - 8.3 10e3/uL    Absolute Lymphocytes 1.0 0.8 - 5.3 10e3/uL    Absolute Monocytes 1.6 (H) 0.0 - 1.3 10e3/uL    Absolute Eosinophils 0.0 0.0 - 0.7 10e3/uL    Absolute Basophils 0.0 0.0 - 0.2 10e3/uL    Absolute Immature Granulocytes 0.1 <=0.4 10e3/uL    Absolute NRBCs 0.0 10e3/uL   CBC with platelets differential     Status: Abnormal    Narrative    The following orders were created for panel order CBC with platelets differential.  Procedure                               Abnormality         Status                      ---------                               -----------         ------                     CBC with platelets and d...[[id number]]  Abnormal            Final result                 Please view results for these tests on the individual orders.     Medications   sodium chloride (PF) 0.9% PF flush 3 mL (3 mLs Intracatheter $Given 8/30/24 0853)   ciprofloxacin (CIPRO) infusion 400 mg (400 mg Intravenous $New Bag 8/30/24 1117)   sodium chloride 0.9% BOLUS 1,000 mL (1,000 mLs Intravenous $New Bag 8/30/24 0935)   acetaminophen (TYLENOL) tablet 1,000 mg (1,000 mg Oral $Given 8/30/24 0935)   ibuprofen (ADVIL/MOTRIN) tablet 600 mg (600 mg Oral $Given 8/30/24 0935)     Labs Ordered and Resulted from Time of ED Arrival to Time of ED Departure   ROUTINE UA WITH MICROSCOPIC REFLEX TO CULTURE - Abnormal       Result Value    Color Urine Orange (*)     Appearance Urine Slightly Cloudy (*)     Glucose Urine Negative      Bilirubin Urine Negative      Ketones Urine Negative      Specific Gravity Urine 1.010      Blood Urine Small (*)     pH Urine 7.0      Protein Albumin Urine 300 (*)     Urobilinogen Urine Normal      Nitrite Urine Negative      Leukocyte Esterase Urine Large (*)     Bacteria Urine Many (*)     WBC Clumps Urine Present (*)     Mucus Urine Present (*)     RBC Urine 15 (*)     WBC Urine >182 (*)    COMPREHENSIVE METABOLIC PANEL - Abnormal    Sodium 133 (*)     Potassium 3.6      Carbon Dioxide (CO2) 20 (*)     Anion Gap 13      Urea Nitrogen 24.5 (*)     Creatinine 1.68 (*)     GFR Estimate 53 (*)     Calcium 9.7      Chloride 100      Glucose 118 (*)     Alkaline Phosphatase 46      AST 18      ALT 21      Protein Total 7.8      Albumin 4.1      Bilirubin Total 0.7     CBC WITH PLATELETS AND DIFFERENTIAL - Abnormal    WBC Count 15.2 (*)     RBC Count 4.99      Hemoglobin 14.9      Hematocrit 43.3      MCV 87      MCH 29.9      MCHC 34.4      RDW 12.6      Platelet Count 171      % Neutrophils 83      % Lymphocytes 6       % Monocytes 10      % Eosinophils 0      % Basophils 0      % Immature Granulocytes 1      NRBCs per 100 WBC 0      Absolute Neutrophils 12.6 (*)     Absolute Lymphocytes 1.0      Absolute Monocytes 1.6 (*)     Absolute Eosinophils 0.0      Absolute Basophils 0.0      Absolute Immature Granulocytes 0.1      Absolute NRBCs 0.0     LACTIC ACID WHOLE BLOOD WITH 1X REPEAT IN 2 HR WHEN >2 - Normal    Lactic Acid, Initial 1.2     URINE CULTURE     No orders to display          Critical care was not performed.     Medical Decision Making  The patient's presentation was of high complexity (a chronic illness severe exacerbation, progression, or side effect of treatment).    The patient's evaluation involved:  review of external note(s) from 1 sources (see separate area of note for details)  review of 3+ test result(s) ordered prior to this encounter (see separate area of note for details)  ordering and/or review of 3+ test(s) in this encounter (see separate area of note for details)  discussion of management or test interpretation with another health professional (pharmacy)    The patient's management necessitated moderate risk (prescription drug management including medications given in the ED).    Assessment & Plan    Chris presents to the emergency department with low back pain, fever, and increasing need to self cath which she reports are all typical of his UTI symptoms.  On exam he appears tachypneic but denies shortness of breath notes this is secondary to his fever.  Denies URI symptoms does have low-grade fever we will attempt to treat fever and reassess breathing.    Differential includes cystitis, pyelonephritis, sepsis.    Will get UA labs lactic acid.  Will give Tylenol ibuprofen and IV fluids.    UA with greater than 185 whites, red cells, leukocyte esterase and bacteria.  Reviewed previous cultures and sensitivities will treat with ciprofloxacin.  Discussed with pharmacy.  Patient does have an elevated white  count but is near his baseline.  Lactate is normal.      Initially planned to bring in for medicine obs for 24 hour  of IV antibiotics given fever and inability to tolerate p.o. with nausea vomiting however patient is very motivated to discharge home tonight.  Will give a dose of IV antibiotics here, p.o. challenge here in the emergency department if he is able to keep down meds we will discharge him home with strict ER return cautions.    Will trial outpatient management and give strict ER return precautions as he may require IV antibiotics if things do not improve.        I have reviewed the nursing notes. I have reviewed the findings, diagnosis, plan and need for follow up with the patient.    New Prescriptions    CIPROFLOXACIN (CIPRO) 500 MG TABLET    Take 1 tablet (500 mg) by mouth 2 times daily for 7 days.       Final diagnoses:   Complicated UTI (urinary tract infection)       Darrell Delvalle MD  [facility] EMERGENCY DEPARTMENT  8/30/2024        Darrell Delvalle MD  08/30/24 [id number]

## 2024-08-30 NOTE — ED TRIAGE NOTES
"39 year old male with history of congenital birth defect requiring a Mitrofanoff, presents with concerns of a possible UTI.  Patient states that he first noted lower back pain at about 1100 yesterday, which is a symptom of his UTIs.  Patient has also noted that he has had to \"self-cath\" more frequently this week.  Patient states that when he has symptoms he needs to \"pant\" , but is not short of breath.      Triage Assessment (Adult)       Row Name 08/30/24 0812          Respiratory WDL    Respiratory WDL X;rhythm/pattern     Rhythm/Pattern, Respiratory other (see comments)  panting due to UTI symptoms        Skin Circulation/Temperature WDL    Skin Circulation/Temperature WDL WDL        Cardiac WDL    Cardiac WDL WDL        Peripheral/Neurovascular WDL    Peripheral Neurovascular WDL WDL        Cognitive/Neuro/Behavioral WDL    Cognitive/Neuro/Behavioral WDL WDL                     "

## 2024-08-30 NOTE — DISCHARGE INSTRUCTIONS
Take your antibiotic as prescribed.  You got 1 dose here in the emergency department please start with your p.m. dose tonight.    Please monitor your symptoms very carefully if you get vomiting and are unable to tolerate your antibiotics.  Ongoing fevers or worsening symptoms you will need admission and IV antibiotics.    Return to the ER for new or worsening symptoms

## 2024-09-02 ENCOUNTER — TELEPHONE (OUTPATIENT)
Dept: NURSING | Facility: CLINIC | Age: 39
End: 2024-09-02
Payer: COMMERCIAL

## 2024-09-02 LAB
BACTERIA UR CULT: ABNORMAL

## 2024-09-02 NOTE — TELEPHONE ENCOUNTER
[facility]    Reason for call: Lab Result Notification     Lab Result (including Rx patient on, if applicable).  If culture, copy of lab report at bottom.  Lab Result: Final Urine Culture Report on 8/30/24  [facility] Emergency Dept discharge antibiotic prescribed: Ciprofloxacin (Cipro) 500 mg tablet, 1 tablet (500 mg) by mouth 2 times daily for 7 days.   Bacterial Growth: two strains of >100,000 CFU/ML Citrobacter freundii complex AND 50,000 - 100,000 CFU/ML Citrobacter freundii     Patient's current Symptoms:   Back to normal after 48 hours    RN Recommendations/Instructions per [facility] lab result protocol:   [facility] ED lab result protocol utilized: urine culture  No change in treatment per [facility] lab result Urine Culture protocol.    Patient/care giver notified to contact your PCP clinic or return to the Emergency department if your:  Symptoms worsen or other concerning symptoms.        Silviano Hickey RN

## 2024-09-12 ENCOUNTER — LAB (OUTPATIENT)
Dept: LAB | Facility: CLINIC | Age: 39
End: 2024-09-12
Payer: COMMERCIAL

## 2024-09-12 ENCOUNTER — NURSE TRIAGE (OUTPATIENT)
Dept: NURSING | Facility: CLINIC | Age: 39
End: 2024-09-12

## 2024-09-12 DIAGNOSIS — R39.89 SUSPECTED UTI: Primary | ICD-10-CM

## 2024-09-12 DIAGNOSIS — R39.89 SUSPECTED UTI: ICD-10-CM

## 2024-09-12 LAB
ALBUMIN UR-MCNC: 100 MG/DL
APPEARANCE UR: CLEAR
BILIRUB UR QL STRIP: NEGATIVE
COLOR UR AUTO: ABNORMAL
GLUCOSE UR STRIP-MCNC: NEGATIVE MG/DL
GRANULAR CAST: 1 /LPF
HGB UR QL STRIP: ABNORMAL
HYALINE CASTS: 1 /LPF
KETONES UR STRIP-MCNC: NEGATIVE MG/DL
LEUKOCYTE ESTERASE UR QL STRIP: ABNORMAL
NITRATE UR QL: NEGATIVE
PH UR STRIP: 6 [PH] (ref 5–7)
RBC URINE: 5 /HPF
SP GR UR STRIP: 1.01 (ref 1–1.03)
UROBILINOGEN UR STRIP-MCNC: NORMAL MG/DL
WBC CLUMPS #/AREA URNS HPF: PRESENT /HPF
WBC URINE: 61 /HPF

## 2024-09-12 PROCEDURE — 81001 URINALYSIS AUTO W/SCOPE: CPT | Performed by: PATHOLOGY

## 2024-09-12 PROCEDURE — 99000 SPECIMEN HANDLING OFFICE-LAB: CPT | Performed by: PATHOLOGY

## 2024-09-12 PROCEDURE — 87086 URINE CULTURE/COLONY COUNT: CPT | Performed by: UROLOGY

## 2024-09-12 NOTE — PROGRESS NOTES
Patient called and spoke to red flag triage, reports sx  Patient feels that he has another UTI , started last night with  urgency, cloudy urine, sweaty and flank pain last night but not today so far.   Pt would like to just drop off a specimen.     Writer reviewed chart and placed orders for UA/UC, writer attempted to reach patient, no answer will call back in a few minutes and send [employer] message to complete ua/UC    Thank you,  INEZ TorresN RN-Triage-Urology

## 2024-09-12 NOTE — TELEPHONE ENCOUNTER
Nurse Triage SBAR    Is this a 2nd Level Triage? YES, LICENSED PRACTITIONER REVIEW IS REQUIRED    Situation:   Patient feels that he has another UTI , started last night with  urgency, cloudy urine, sweaty and flank pain last night but not today so far.   Pt would like to just drop off a specimen.  Pt states that he gets SOB with every UTI and he is SOB, but denies dizzy or lightheaded.  He states that he no longer has seizures prior to UTI as his seizures are under control now.    Background:    9/2/24 12:03 PM  Note  [facility] ([place]     Reason for call: Lab Result Notification      Lab Result (including Rx patient on, if applicable).  If culture, copy of lab report at bottom.  Lab Result: Final Urine Culture Report on 8/30/24  [facility] Emergency Dept discharge antibiotic prescribed: Ciprofloxacin (Cipro) 500 mg tablet, 1 tablet (500 mg) by mouth 2 times daily for 7 days.   Bacterial Growth: two strains of >100,000 CFU/ML Citrobacter freundii complex AND 50,000 - 100,000 CFU/ML Citrobacter freundii      Patient's current Symptoms:   Back to normal after 48 hours     RN Recommendations/Instructions per [place] ED lab result protocol:   [facility] ED lab result protocol utilized: urine culture  No change in treatment per [facility] lab result Urine Culture protocol              Assessment: Pt states that he did finish the above prescription.  Patient states that he is always SOB with the start of a UTI and he is SOB  Denies dizzy or lightheaded.  He also states that before a UTI, he used to have seizures, but his seizures are under control now and has not had one .  Pt states that he is currently not sweaty or feverish, but was sweaty last night.  Pt states that he does not currently have flank pain, but did last night.  Patient states that he is drinking a lot of fluid and has some Cranberry juice in his home to drink as well.  He states his urine  "is cloudy and did not notice if there was a bad smell to it.  Frequent Bm's  Patient uses  self Catheters  Pt would like to just drop off a specimen    [facility]      Protocol Recommended Disposition:   Routing to urology for a call back    Recommendation:   Drink plenty of fluids      Routed to provider        Reason for Disposition   Urinating more frequently than usual (i.e., frequency)    Additional Information   Negative: Unable to urinate (or only a few drops) > 4 hours and bladder feels very full (e.g., palpable bladder or strong urge to urinate)   Negative: Decreased urination and drinking very little and dehydration suspected (e.g., dark urine, no urine > 12 hours, very dry mouth, very lightheaded)   Negative: Patient sounds very sick or weak to the triager   Negative: Fever > 100.4 F  (38.0 C)    Answer Assessment - Initial Assessment Questions  1. SYMPTOM: \"What's the main symptom you're concerned about?\" (e.g., frequency, incontinence)      SOB, with cloudy urine.  Pt completed last dose of antibiotics of Cipro given  2. ONSET: \"When did the    start?\"      Began last night  3. PAIN: \"Is there any pain?\" If Yes, ask: \"How bad is it?\" (Scale: 1-10; mild, moderate, severe)      Pain in hip and went away.  Denies flank pain now.  4. CAUSE: \"What do you think is causing the symptoms?\"      UTI  5. OTHER SYMPTOMS: \"Do you have any other symptoms?\" (e.g., blood in urine, fever, flank pain, pain with urination)      No fever now, but sweaty last night , no flank pain.  Voiding with urgency but no burning.   Pt uses a catheter.  Denies bad smell, was not paying attention  6. PREGNANCY: \"Is there any chance you are pregnant?\" \"When was your last menstrual period?\"      Na    Protocols used: Urinary Symptoms-A-[place]    "

## 2024-09-13 LAB — BACTERIA UR CULT: NO GROWTH

## 2024-09-16 DIAGNOSIS — N39.0 URINARY TRACT INFECTION WITHOUT HEMATURIA, SITE UNSPECIFIED: Primary | ICD-10-CM

## 2024-09-16 RX ORDER — LEVOFLOXACIN 750 MG/1
750 TABLET, FILM COATED ORAL DAILY
Qty: 5 TABLET | Refills: 0 | Status: SHIPPED | OUTPATIENT
Start: 2024-09-16 | End: 2024-09-21

## 2024-09-17 NOTE — TELEPHONE ENCOUNTER
Left voice mail for patient to call back to schedule surgery with Dr Blunt. Left phone number [phone]   Acute UTI Pneumonia, aspiration

## 2024-09-21 ENCOUNTER — PRE VISIT (OUTPATIENT)
Dept: UROLOGY | Facility: CLINIC | Age: 39
End: 2024-09-21
Payer: COMMERCIAL

## 2024-09-22 NOTE — TELEPHONE ENCOUNTER
Reason for visit: cystoscopy     Relevant information: neurogenic bladder, extrophy, mitrofanoff and CIC    Records/imaging/labs/orders: in epic    Pt called: No need for a call    At Rooming: prep catheterizable channel    Yash Ramírez  9/21/2024  8:52 PM

## 2024-10-12 ENCOUNTER — HEALTH MAINTENANCE LETTER (OUTPATIENT)
Age: 39
End: 2024-10-12

## 2024-11-15 ENCOUNTER — PRE VISIT (OUTPATIENT)
Dept: UROLOGY | Facility: CLINIC | Age: 39
End: 2024-11-15
Payer: COMMERCIAL

## 2024-11-15 NOTE — TELEPHONE ENCOUNTER
Reason for visit: Cystoscopy         Relevant information: UTI without hematuria, neurogenic bladder    Records/imaging/labs/orders: all records available    Pt called: no need for a call    At Rooming: ask symptoms, collect a urine/dip    Lito Dorantes  11/15/2024  10:47 AM

## 2024-11-25 ENCOUNTER — TELEPHONE (OUTPATIENT)
Dept: UROLOGY | Facility: CLINIC | Age: 39
End: 2024-11-25

## 2024-11-25 ENCOUNTER — OFFICE VISIT (OUTPATIENT)
Dept: UROLOGY | Facility: CLINIC | Age: 39
End: 2024-11-25
Payer: COMMERCIAL

## 2024-11-25 VITALS — BODY MASS INDEX: 40.63 KG/M2 | HEIGHT: 72 IN | WEIGHT: 300 LBS

## 2024-11-25 DIAGNOSIS — N31.9 NEUROGENIC BLADDER: Primary | ICD-10-CM

## 2024-11-25 RX ORDER — LIDOCAINE HYDROCHLORIDE 20 MG/ML
JELLY TOPICAL ONCE
Status: COMPLETED | OUTPATIENT
Start: 2024-11-25 | End: 2024-11-25

## 2024-11-25 RX ADMIN — LIDOCAINE HYDROCHLORIDE: 20 JELLY TOPICAL at 17:12

## 2024-11-25 ASSESSMENT — PAIN SCALES - GENERAL: PAINLEVEL_OUTOF10: NO PAIN (0)

## 2024-11-25 NOTE — LETTER
11/25/2024       RE: Olu Trinh  [address]  [facility] [address]     Dear Colleague,    Thank you for referring your patient, Olu Trinh, to the [facility] [place] at [facility]. Please see a copy of my visit note below.    UROLOGY OUTPATIENT CLINIC NOTE    Name: Olu Trinh    MRN: [id number]   YOB: 1985  Date of Encounter: 11/25/24              History of Present Illness:   Mr. Olu Trinh is a 39 year old male seen for follow-up and cystoscopy. 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 Has had bladder stones removed in past multiple times in the past (2015 percutaneous w/ Dr. Blunt, 2018 Dr. Ibrahim, 2021 w/ Dr. Blunt, 2022 w/ Dr. Blunt).    He catheters per his Mitrofanoff stoma into an augmented bladder using a 14F straight catheter q3-4h. He has chronic bilateral hydronephrosis.    3/25/24: cysto per channel w/ Dr. Mckeon - small amount of mucus, no tumors/stones    11/25/24: presents for follow-up. Thinks he has had 1-2 UTIs since last seen but otherwise doing well. He catheterizes via an APV and irrigations - tries to remember but doesn't always happen daily.    FLEXIBLE CYSTOSCOPY (November 25, 2024)  After informed consent was obtained, the patient was brought to the procedure room and placed in the supine position. The abdomen was prepped and draped in a sterile fashion. A 16F flexible cystoscope was introduced through a well-lubricated APV stoma. Findings and interventions were as noted below.    Channel: patent, somewhat tortuous  Bladder: large capacity. No stones. Mild amount of mucus, which was irrigated out.         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     Learning disability      Neurogenic bladder, NOS 10/30/2012     Obesity (BMI 35.0-39.9 without comorbidity)      Other hydronephrosis 06/02/2015     Unspecified epilepsy with intractable epilepsy 10/22/2013          Past Surgical History:     Past Surgical History:   Procedure Laterality Date     BLADDER AUGMENTATION       bladder neck reconstruction      Mikey Glez     CYSTOSCOPY N/A 07/10/2018    Procedure: CYSTOSCOPY;  CYSTOSCOPY  OF NEOBLADDER  ;  Surgeon: Mario Ibrahim MD;  Location: [facility] OR     CYSTOSCOPY VIA MITROFANOFF N/A 1/29/2021    Procedure: CYSTOSCOPY, VIA MITROFANOFF;  Surgeon: Joel Blunt MD;  Location: [facility] OR     LASER HOLMIUM LITHOTRIPSY BLADDER N/A 07/24/2015    Procedure: LASER HOLMIUM LITHOTRIPSY BLADDER;  Surgeon: Joel Blunt MD;  Location: [facility] OR     LASER HOLMIUM LITHOTRIPSY BLADDER N/A 07/10/2018    Procedure: LASER HOLMIUM LITHOTRIPSY BLADDER;;  Surgeon: Mario Ibrahim MD;  Location:  OR     LEFT TEMPORAL CRANIOTOMY FOR ANTERIOR TEMPORAL LOBECTOMY  10/01/2018     MITROFANOFF PROCEDURE (APPENDIX CONDUIT)  01/01/2002     PERCUTANEOUS CYSTOLITHOTOMY N/A 9/9/2022    Procedure: CYSTOLITHOTOMY, WITH LASER STANDBY;  Surgeon: Manjit Nicolas MD;  Location: [facility] OR     [facility] REMOVAL OF KIDNEY STONE            Social History:     Social History     Tobacco Use     Smoking status: Never     Smokeless tobacco: Never   Substance Use Topics     Alcohol use: Yes     Comment: Alcoholic beverage once or twice per week.          Family History:     Family History   Problem Relation Age of Onset     Seizure Disorder Mother      Breast Cancer Mother      Cancer Father           Allergies:     Allergies   Allergen Reactions     Ondansetron      Hallucinations     Sulfa Antibiotics      unknown          Medications:     Current Outpatient Medications   Medication Sig Dispense Refill     amoxicillin-clavulanate (AUGMENTIN)  875-125 MG tablet Take 1 tablet by mouth 2 times daily 28 tablet 0     Cranberry 500 MG CAPS Take 650 mg by mouth       levETIRAcetam (KEPPRA XR) 500 MG 24 hr tablet Take 3,000 mg by mouth       OXcarbazepine ER 24hour (OXTELLAR XR) 600 MG 24 hr tablet 1.5 tablet (900 mg)       No current facility-administered medications for this visit.          Review of Systems:    ROS: 14-point review of systems was negative aside from that noted in the HPI. Additional pertinent positives are listed below.          Physical Exam:   Ht 1.829 m (6')   Wt 136.1 kg (300 lb)   BMI 40.69 kg/m    Estimated body mass index is 40.32 kg/m  as calculated from the following:    Height as of 8/30/24: 1.829 m (6').    Weight as of 8/30/24: 134.9 kg (297 lb 4.8 oz).  General: Pleasant male in no apparent distress.  Resp: No respiratory distress  CV: RRR  Abdomen: Soft, nontender, nondistended. Healthy and patent Mitrofanoff stoma.       Labs:    All laboratory data reviewed with patient    Significant for Cr 1.68 (8/30/24)      Imaging:    All imaging reviewed with patient.    CTAP (7/2023):  1. Substantially increased chronic right perinephric fat stranding  concerning for acute on chronic pyelonephritis.  2. Chronic bilateral hydronephrosis and hydroureters without evidence  of acute obstruction.  3. Postsurgical changes of bladder exstrophy repair with Mitrofanoff  procedure. Small bladder stone is similar to prior studies,  indeterminate if recurrent or unchanged bladder stone.      Outside records:    I spent 10 minutes reviewing outside records.         Assessment and Plan:   39M with h/o neurogenic bladder secondary to bladder/cloacal exstrophy s/p bladder augmentation and appendiceal Mitrofanoff (childhood - unknown age/bowel type) with multieple episodes of bladder stones requiring endoscopic removal, presenting for follow-up cystoscopy.    Continue CIC q3-4h  Encouraged daily irrigation and patient will try nightly vinegar  instillation to prevent UTIs. Provided Dr. Blunt's website for instructions.  Due for imaging - will obtain renal US to evaluate chronic hydronephrosis  Follow up in 6 months for repeat cysto to survey for stones/mucus    Benson Wayne MD  Genitourinary Reconstructive Surgery Fellow    Additional Coding Information:    Problems:  4 -- one or more chronic illnesses with exacerbation or side effects    Data Reviewed  Review of external notes as documented above     Tests ordered: renal US    Level of risk:  3 -- low risk (e.g., OTC medication or observation, minor surgery without risks)    Time spent:  15 minutes spent by me on the date of the encounter doing chart review, history and exam, documentation and further activities per the note, outside of cystoscopy.      Again, thank you for allowing me to participate in the care of your patient.      Sincerely,    Benson Wayne MD

## 2024-11-25 NOTE — NURSING NOTE
Chief Complaint   Patient presents with    Consult     Pt states here for a cystoscpy       Height 1.829 m (6'), weight 136.1 kg (300 lb). Body mass index is 40.69 kg/m .    Patient Active Problem List   Diagnosis    Attention deficit hyperactivity disorder (ADHD)    Neurogenic bladder    Other hydronephrosis    Bladder stone    Exstrophy of bladder sequence    Intractable epilepsy without status epilepticus, unspecified epilepsy type (H)    Pyelonephritis    E coli bacteremia       Allergies   Allergen Reactions    Ondansetron      Hallucinations    Sulfa Antibiotics      unknown       Current Outpatient Medications   Medication Sig Dispense Refill    amoxicillin-clavulanate (AUGMENTIN) 875-125 MG tablet Take 1 tablet by mouth 2 times daily 28 tablet 0    Cranberry 500 MG CAPS Take 650 mg by mouth      levETIRAcetam (KEPPRA XR) 500 MG 24 hr tablet Take 3,000 mg by mouth      OXcarbazepine ER 24hour (OXTELLAR XR) 600 MG 24 hr tablet 1.5 tablet (900 mg)         Social History     Tobacco Use    Smoking status: Never    Smokeless tobacco: Never   Vaping Use    Vaping status: Never Used   Substance Use Topics    Alcohol use: Yes     Comment: Alcoholic beverage once or twice per week.    Drug use: No       What to expect after the procedure reviewed with patient: yes    Lito Dorantes  2024  4:53 PM     Invasive Procedure Safety Checklist:    Procedure: cystscopy    Action: Complete sections and checkboxes as appropriate.    Pre-procedure:  1. Patient ID Verified with 2 identifiers (Yaa and  or MRN) : YES    2. Procedure and site verified with patient/designee (when able) : YES    3. Accurate consent documentation in medical record : YES    4. H&P (or appropriate assessment) documented in medical record : YES  H&P must be up to 30 days prior to procedure an updated within 24 hours of                 Procedure as applicable.     5. Relevant diagnostic and radiology test results appropriately labeled and  displayed as applicable : YES    6. Blood products, implants, devices, and/or special equipment available for the procedure as applicable : YES    7. Procedure site(s) marked with provider initials [Exclusions: None] : NO    8. Marking not required. Reason : Yes  Procedure does not require site marking    Time Out:     Time-Out performed immediately prior to starting procedure, including verbal and active participation of all team members addressing: YES    1. Correct patient identity.  2. Confirmed that the correct side and site are marked.  3. An accurate procedure to be done.  4. Agreement on the procedure to be done.  5. Correct patient position.  6. Relevant images and results are properly labeled and appropriately displayed.  7. The need to administer antibiotics or fluids for irrigation purposes during the procedure as applicable.  8. Safety precautions based on patient history or medication use.    During Procedure: Verification of correct person, site, and procedure occurs any time the responsibility for care of the patient is transferred to another member of the care team.      The following medication was given:     MEDICATION:  Uro-jet  ROUTE: Intra-urethral   SITE: Urethra  DOSE: 10 mL 2% lidocaine  LOT #: [id number]  : [employer]  EXPIRATION DATE:   NDC#: [address]-32   Was there drug waste? No    Prior to administration, verified patient identity using patient's name and date of birth.  Due to administration, patient instructed to remain in clinic for 15 minutes  afterwards, and to report any adverse reaction to me immediately.      Drug Amount Wasted:  None.  Vial/Syringe: Single dose vial    Lito Dorantes  2024    Action: Complete sections and checkboxes as appropriate.    Pre-procedure:  1. Patient ID Verified with 2 identifiers (Yaa and  or MRN) : YES    2. Procedure and site verified with patient/designee (when able) : YES    3. Accurate consent documentation in  medical record : YES    4. H&P (or appropriate assessment) documented in medical record : YES  H&P must be up to 30 days prior to procedure an updated within 24 hours of                 Procedure as applicable.     5. Relevant diagnostic and radiology test results appropriately labeled and displayed as applicable : YES    6. Blood products, implants, devices, and/or special equipment available for the procedure as applicable : YES    7. Procedure site(s) marked with provider initials [Exclusions: None] : NO    8. Marking not required. Reason : Yes  Procedure does not require site marking    Time Out:     Time-Out performed immediately prior to starting procedure, including verbal and active participation of all team members addressing: YES    1. Correct patient identity.  2. Confirmed that the correct side and site are marked.  3. An accurate procedure to be done.  4. Agreement on the procedure to be done.  5. Correct patient position.  6. Relevant images and results are properly labeled and appropriately displayed.  7. The need to administer antibiotics or fluids for irrigation purposes during the procedure as applicable.  8. Safety precautions based on patient history or medication use.    During Procedure: Verification of correct person, site, and procedure occurs any time the responsibility for care of the patient is transferred to another member of the care team.    No medications administered during this procedure.    Lito Dorantes  November 25, 2024

## 2024-11-25 NOTE — PROGRESS NOTES
UROLOGY OUTPATIENT CLINIC NOTE    Name: Olu Trinh    MRN: [id number]   YOB: 1985  Date of Encounter: 11/25/24              History of Present Illness:   Mr. Olu Trinh is a 39 year old male seen for follow-up and cystoscopy. 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 Has had bladder stones removed in past multiple times in the past (2015 percutaneous w/ Dr. Blunt, 2018 Dr. Ibrahim, 2021 w/ Dr. Blunt, 2022 w/ Dr. Blunt).    He catheters per his Mitrofanoff stoma into an augmented bladder using a 14F straight catheter q3-4h. He has chronic bilateral hydronephrosis.    3/25/24: cysto per channel w/ Dr. Mckeon - small amount of mucus, no tumors/stones    11/25/24: presents for follow-up. Thinks he has had 1-2 UTIs since last seen but otherwise doing well. He catheterizes via an APV and irrigations - tries to remember but doesn't always happen daily.    FLEXIBLE CYSTOSCOPY (November 25, 2024)  After informed consent was obtained, the patient was brought to the procedure room and placed in the supine position. The abdomen was prepped and draped in a sterile fashion. A 16F flexible cystoscope was introduced through a well-lubricated APV stoma. Findings and interventions were as noted below.    Channel: patent, somewhat tortuous  Bladder: large capacity. No stones. Mild amount of mucus, which was irrigated out.         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    Learning disability     Neurogenic bladder, NOS 10/30/2012    Obesity (BMI 35.0-39.9 without comorbidity)     Other hydronephrosis 06/02/2015    Unspecified epilepsy with intractable epilepsy 10/22/2013          Past Surgical History:     Past Surgical History:   Procedure Laterality  Date    BLADDER AUGMENTATION      bladder neck reconstruction      Mikey Glez    CYSTOSCOPY N/A 07/10/2018    Procedure: CYSTOSCOPY;  CYSTOSCOPY  OF NEOBLADDER  ;  Surgeon: Mario Ibrahim MD;  Location: [facility] OR    CYSTOSCOPY VIA MITROFANOFF N/A 1/29/2021    Procedure: CYSTOSCOPY, VIA MITROFANOFF;  Surgeon: Joel Blunt MD;  Location: [facility] OR    LASER HOLMIUM LITHOTRIPSY BLADDER N/A 07/24/2015    Procedure: LASER HOLMIUM LITHOTRIPSY BLADDER;  Surgeon: Joel Blunt MD;  Location: [facility] OR    LASER HOLMIUM LITHOTRIPSY BLADDER N/A 07/10/2018    Procedure: LASER HOLMIUM LITHOTRIPSY BLADDER;;  Surgeon: Mario Ibrahim MD;  Location:  OR    LEFT TEMPORAL CRANIOTOMY FOR ANTERIOR TEMPORAL LOBECTOMY  10/01/2018    MITROFANOFF PROCEDURE (APPENDIX CONDUIT)  01/01/2002    PERCUTANEOUS CYSTOLITHOTOMY N/A 9/9/2022    Procedure: CYSTOLITHOTOMY, WITH LASER STANDBY;  Surgeon: Manjit Nicolas MD;  Location: [facility] OR    [facility] REMOVAL OF KIDNEY STONE            Social History:     Social History     Tobacco Use    Smoking status: Never    Smokeless tobacco: Never   Substance Use Topics    Alcohol use: Yes     Comment: Alcoholic beverage once or twice per week.          Family History:     Family History   Problem Relation Age of Onset    Seizure Disorder Mother     Breast Cancer Mother     Cancer Father           Allergies:     Allergies   Allergen Reactions    Ondansetron      Hallucinations    Sulfa Antibiotics      unknown          Medications:     Current Outpatient Medications   Medication Sig Dispense Refill    amoxicillin-clavulanate (AUGMENTIN) 875-125 MG tablet Take 1 tablet by mouth 2 times daily 28 tablet 0    Cranberry 500 MG CAPS Take 650 mg by mouth      levETIRAcetam (KEPPRA XR) 500 MG 24 hr tablet Take 3,000 mg by mouth      OXcarbazepine ER 24hour (OXTELLAR XR) 600 MG 24 hr tablet 1.5 tablet (900 mg)       No current facility-administered medications for this visit.          Review of  Systems:    ROS: 14-point review of systems was negative aside from that noted in the HPI. Additional pertinent positives are listed below.          Physical Exam:   Ht 1.829 m (6')   Wt 136.1 kg (300 lb)   BMI 40.69 kg/m    Estimated body mass index is 40.32 kg/m  as calculated from the following:    Height as of 8/30/24: 1.829 m (6').    Weight as of 8/30/24: 134.9 kg (297 lb 4.8 oz).  General: Pleasant male in no apparent distress.  Resp: No respiratory distress  CV: RRR  Abdomen: Soft, nontender, nondistended. Healthy and patent Mitrofanoff stoma.       Labs:    All laboratory data reviewed with patient    Significant for Cr 1.68 (8/30/24)      Imaging:    All imaging reviewed with patient.    CTAP (7/2023):  1. Substantially increased chronic right perinephric fat stranding  concerning for acute on chronic pyelonephritis.  2. Chronic bilateral hydronephrosis and hydroureters without evidence  of acute obstruction.  3. Postsurgical changes of bladder exstrophy repair with Mitrofanoff  procedure. Small bladder stone is similar to prior studies,  indeterminate if recurrent or unchanged bladder stone.      Outside records:    I spent 10 minutes reviewing outside records.         Assessment and Plan:   39M with h/o neurogenic bladder secondary to bladder/cloacal exstrophy s/p bladder augmentation and appendiceal Mitrofanoff (childhood - unknown age/bowel type) with multieple episodes of bladder stones requiring endoscopic removal, presenting for follow-up cystoscopy.    Continue CIC q3-4h  Encouraged daily irrigation and patient will try nightly vinegar instillation to prevent UTIs. Provided Dr. Blunt's website for instructions.  Due for imaging - will obtain renal US to evaluate chronic hydronephrosis  Follow up in 6 months for repeat cysto to survey for stones/mucus    Benson Wayne MD  Genitourinary Reconstructive Surgery Fellow    Additional Coding Information:    Problems:  4 -- one or more chronic illnesses  with exacerbation or side effects    Data Reviewed  Review of external notes as documented above     Tests ordered: renal US    Level of risk:  3 -- low risk (e.g., OTC medication or observation, minor surgery without risks)    Time spent:  15 minutes spent by me on the date of the encounter doing chart review, history and exam, documentation and further activities per the note, outside of cystoscopy.

## 2024-11-25 NOTE — PROGRESS NOTES
The following medication was not given:     MEDICATION:  Lidocaine 2% jelly  LOT #: [id number]  :  [employer]  EXPIRATION DATE: 6/2026  NDC#: [id number]  Was there drug waste? Yes  Amount of drug waste (mL): 10ml.  Reason for waste:  Medication pulled from pyxis, but patient was prepped via Mitrofanoff not urethral for cystoscopy. No need for medication    No Gautam RN  Patient Care Supervisor- [facility]

## 2025-01-13 ENCOUNTER — APPOINTMENT (OUTPATIENT)
Dept: CT IMAGING | Facility: CLINIC | Age: 40
End: 2025-01-13
Attending: EMERGENCY MEDICINE
Payer: COMMERCIAL

## 2025-01-13 ENCOUNTER — DOCUMENTATION ONLY (OUTPATIENT)
Dept: UROLOGY | Facility: CLINIC | Age: 40
End: 2025-01-13

## 2025-01-13 ENCOUNTER — HOSPITAL ENCOUNTER (INPATIENT)
Facility: CLINIC | Age: 40
LOS: 1 days | Discharge: HOME | End: 2025-01-14
Attending: EMERGENCY MEDICINE | Admitting: INTERNAL MEDICINE
Payer: COMMERCIAL

## 2025-01-13 DIAGNOSIS — Q64.10 EXSTROPHY OF URINARY BLADDER: ICD-10-CM

## 2025-01-13 DIAGNOSIS — N21.0 BLADDER CALCULUS: ICD-10-CM

## 2025-01-13 DIAGNOSIS — N10 PYELONEPHRITIS, ACUTE: ICD-10-CM

## 2025-01-13 DIAGNOSIS — N30.00 ACUTE CYSTITIS WITHOUT HEMATURIA: Primary | ICD-10-CM

## 2025-01-13 DIAGNOSIS — Z93.52 APPENDICO-VESICOSTOMY STATUS (H): ICD-10-CM

## 2025-01-13 LAB
ALBUMIN SERPL BCG-MCNC: 3.7 G/DL (ref 3.5–5.2)
ALBUMIN SERPL BCG-MCNC: 3.8 G/DL (ref 3.5–5.2)
ALBUMIN UR-MCNC: 600 MG/DL
ALP SERPL-CCNC: 45 U/L (ref 40–150)
ALP SERPL-CCNC: 54 U/L (ref 40–150)
ALT SERPL W P-5'-P-CCNC: 18 U/L (ref 0–70)
ALT SERPL W P-5'-P-CCNC: 23 U/L (ref 0–70)
ANION GAP SERPL CALCULATED.3IONS-SCNC: 12 MMOL/L (ref 7–15)
ANION GAP SERPL CALCULATED.3IONS-SCNC: 14 MMOL/L (ref 7–15)
APPEARANCE UR: ABNORMAL
AST SERPL W P-5'-P-CCNC: 14 U/L (ref 0–45)
AST SERPL W P-5'-P-CCNC: 17 U/L (ref 0–45)
BASOPHILS # BLD AUTO: 0 10E3/UL (ref 0–0.2)
BASOPHILS NFR BLD AUTO: 0 %
BILIRUB SERPL-MCNC: 0.4 MG/DL
BILIRUB SERPL-MCNC: 0.4 MG/DL
BILIRUB UR QL STRIP: NEGATIVE
BUN SERPL-MCNC: 27 MG/DL (ref 6–20)
BUN SERPL-MCNC: 27.1 MG/DL (ref 6–20)
CALCIUM SERPL-MCNC: 9.2 MG/DL (ref 8.8–10.4)
CALCIUM SERPL-MCNC: 9.4 MG/DL (ref 8.8–10.4)
CHLORIDE SERPL-SCNC: 106 MMOL/L (ref 98–107)
CHLORIDE SERPL-SCNC: 109 MMOL/L (ref 98–107)
COLOR UR AUTO: ABNORMAL
CREAT SERPL-MCNC: 1.34 MG/DL (ref 0.67–1.17)
CREAT SERPL-MCNC: 1.34 MG/DL (ref 0.67–1.17)
EGFRCR SERPLBLD CKD-EPI 2021: 69 ML/MIN/1.73M2
EGFRCR SERPLBLD CKD-EPI 2021: 69 ML/MIN/1.73M2
EOSINOPHIL # BLD AUTO: 0.3 10E3/UL (ref 0–0.7)
EOSINOPHIL NFR BLD AUTO: 2 %
ERYTHROCYTE [DISTWIDTH] IN BLOOD BY AUTOMATED COUNT: 12.7 % (ref 10–15)
ERYTHROCYTE [DISTWIDTH] IN BLOOD BY AUTOMATED COUNT: 12.8 % (ref 10–15)
GLUCOSE SERPL-MCNC: 114 MG/DL (ref 70–99)
GLUCOSE SERPL-MCNC: 122 MG/DL (ref 70–99)
GLUCOSE UR STRIP-MCNC: NEGATIVE MG/DL
HCO3 SERPL-SCNC: 17 MMOL/L (ref 22–29)
HCO3 SERPL-SCNC: 19 MMOL/L (ref 22–29)
HCT VFR BLD AUTO: 42.1 % (ref 40–53)
HCT VFR BLD AUTO: 45.4 % (ref 40–53)
HGB BLD-MCNC: 14.6 G/DL (ref 13.3–17.7)
HGB BLD-MCNC: 15.7 G/DL (ref 13.3–17.7)
HGB UR QL STRIP: ABNORMAL
HOLD SPECIMEN: NORMAL
IMM GRANULOCYTES # BLD: 0.1 10E3/UL
IMM GRANULOCYTES NFR BLD: 0 %
KETONES UR STRIP-MCNC: NEGATIVE MG/DL
LACTATE SERPL-SCNC: 1.2 MMOL/L (ref 0.7–2)
LEUKOCYTE ESTERASE UR QL STRIP: ABNORMAL
LIPASE SERPL-CCNC: 59 U/L (ref 13–60)
LYMPHOCYTES # BLD AUTO: 1.7 10E3/UL (ref 0.8–5.3)
LYMPHOCYTES NFR BLD AUTO: 13 %
MCH RBC QN AUTO: 29.8 PG (ref 26.5–33)
MCH RBC QN AUTO: 29.8 PG (ref 26.5–33)
MCHC RBC AUTO-ENTMCNC: 34.6 G/DL (ref 31.5–36.5)
MCHC RBC AUTO-ENTMCNC: 34.7 G/DL (ref 31.5–36.5)
MCV RBC AUTO: 86 FL (ref 78–100)
MCV RBC AUTO: 86 FL (ref 78–100)
MONOCYTES # BLD AUTO: 0.9 10E3/UL (ref 0–1.3)
MONOCYTES NFR BLD AUTO: 7 %
NEUTROPHILS # BLD AUTO: 10.2 10E3/UL (ref 1.6–8.3)
NEUTROPHILS NFR BLD AUTO: 77 %
NITRATE UR QL: POSITIVE
NRBC # BLD AUTO: 0 10E3/UL
NRBC BLD AUTO-RTO: 0 /100
PH UR STRIP: 7 [PH] (ref 5–7)
PLATELET # BLD AUTO: 210 10E3/UL (ref 150–450)
PLATELET # BLD AUTO: 233 10E3/UL (ref 150–450)
POTASSIUM SERPL-SCNC: 3.6 MMOL/L (ref 3.4–5.3)
POTASSIUM SERPL-SCNC: 3.7 MMOL/L (ref 3.4–5.3)
PROT SERPL-MCNC: 6.4 G/DL (ref 6.4–8.3)
PROT SERPL-MCNC: 6.8 G/DL (ref 6.4–8.3)
RBC # BLD AUTO: 4.9 10E6/UL (ref 4.4–5.9)
RBC # BLD AUTO: 5.26 10E6/UL (ref 4.4–5.9)
RBC URINE: >182 /HPF
SODIUM SERPL-SCNC: 138 MMOL/L (ref 135–145)
SODIUM SERPL-SCNC: 139 MMOL/L (ref 135–145)
SP GR UR STRIP: 1.01 (ref 1–1.03)
TRANSITIONAL EPI: <1 /HPF
UROBILINOGEN UR STRIP-MCNC: NORMAL MG/DL
WBC # BLD AUTO: 11.5 10E3/UL (ref 4–11)
WBC # BLD AUTO: 13.2 10E3/UL (ref 4–11)
WBC CLUMPS #/AREA URNS HPF: PRESENT /HPF
WBC URINE: 75 /HPF

## 2025-01-13 PROCEDURE — 99222 1ST HOSP IP/OBS MODERATE 55: CPT | Mod: FS | Performed by: HOSPITALIST

## 2025-01-13 PROCEDURE — 83605 ASSAY OF LACTIC ACID: CPT | Performed by: EMERGENCY MEDICINE

## 2025-01-13 PROCEDURE — 74176 CT ABD & PELVIS W/O CONTRAST: CPT

## 2025-01-13 PROCEDURE — 250N000013 HC RX MED GY IP 250 OP 250 PS 637: Performed by: NURSE PRACTITIONER

## 2025-01-13 PROCEDURE — 84075 ASSAY ALKALINE PHOSPHATASE: CPT | Performed by: NURSE PRACTITIONER

## 2025-01-13 PROCEDURE — 250N000011 HC RX IP 250 OP 636: Performed by: NURSE PRACTITIONER

## 2025-01-13 PROCEDURE — 36415 COLL VENOUS BLD VENIPUNCTURE: CPT | Performed by: NURSE PRACTITIONER

## 2025-01-13 PROCEDURE — 74176 CT ABD & PELVIS W/O CONTRAST: CPT | Mod: 26 | Performed by: RADIOLOGY

## 2025-01-13 PROCEDURE — 84132 ASSAY OF SERUM POTASSIUM: CPT | Performed by: EMERGENCY MEDICINE

## 2025-01-13 PROCEDURE — 250N000011 HC RX IP 250 OP 636: Performed by: EMERGENCY MEDICINE

## 2025-01-13 PROCEDURE — 82247 BILIRUBIN TOTAL: CPT | Performed by: EMERGENCY MEDICINE

## 2025-01-13 PROCEDURE — 87086 URINE CULTURE/COLONY COUNT: CPT | Performed by: EMERGENCY MEDICINE

## 2025-01-13 PROCEDURE — 99243 OFF/OP CNSLTJ NEW/EST LOW 30: CPT | Performed by: PHYSICIAN ASSISTANT

## 2025-01-13 PROCEDURE — 85025 COMPLETE CBC W/AUTO DIFF WBC: CPT | Performed by: EMERGENCY MEDICINE

## 2025-01-13 PROCEDURE — 36415 COLL VENOUS BLD VENIPUNCTURE: CPT | Performed by: EMERGENCY MEDICINE

## 2025-01-13 PROCEDURE — 84155 ASSAY OF PROTEIN SERUM: CPT | Performed by: NURSE PRACTITIONER

## 2025-01-13 PROCEDURE — 83690 ASSAY OF LIPASE: CPT | Performed by: EMERGENCY MEDICINE

## 2025-01-13 PROCEDURE — 120N000002 HC R&B MED SURG/OB UMMC

## 2025-01-13 PROCEDURE — 96374 THER/PROPH/DIAG INJ IV PUSH: CPT | Performed by: EMERGENCY MEDICINE

## 2025-01-13 PROCEDURE — 81001 URINALYSIS AUTO W/SCOPE: CPT | Performed by: EMERGENCY MEDICINE

## 2025-01-13 PROCEDURE — 87040 BLOOD CULTURE FOR BACTERIA: CPT | Performed by: EMERGENCY MEDICINE

## 2025-01-13 PROCEDURE — 99285 EMERGENCY DEPT VISIT HI MDM: CPT | Mod: 25 | Performed by: EMERGENCY MEDICINE

## 2025-01-13 PROCEDURE — 99207 PR APP CREDIT; MD BILLING SHARED VISIT: CPT | Mod: FS | Performed by: NURSE PRACTITIONER

## 2025-01-13 PROCEDURE — 99285 EMERGENCY DEPT VISIT HI MDM: CPT | Performed by: EMERGENCY MEDICINE

## 2025-01-13 PROCEDURE — 99207 PR NO BILLABLE SERVICE THIS VISIT: CPT | Performed by: PHYSICIAN ASSISTANT

## 2025-01-13 PROCEDURE — 85027 COMPLETE CBC AUTOMATED: CPT | Performed by: NURSE PRACTITIONER

## 2025-01-13 PROCEDURE — 96375 TX/PRO/DX INJ NEW DRUG ADDON: CPT | Performed by: EMERGENCY MEDICINE

## 2025-01-13 RX ORDER — CIPROFLOXACIN 2 MG/ML
400 INJECTION, SOLUTION INTRAVENOUS EVERY 12 HOURS
Status: DISCONTINUED | OUTPATIENT
Start: 2025-01-13 | End: 2025-01-14 | Stop reason: HOSPADM

## 2025-01-13 RX ORDER — LIDOCAINE 40 MG/G
CREAM TOPICAL
Status: DISCONTINUED | OUTPATIENT
Start: 2025-01-13 | End: 2025-01-14 | Stop reason: HOSPADM

## 2025-01-13 RX ORDER — AMOXICILLIN 250 MG
2 CAPSULE ORAL 2 TIMES DAILY PRN
Status: DISCONTINUED | OUTPATIENT
Start: 2025-01-13 | End: 2025-01-14 | Stop reason: HOSPADM

## 2025-01-13 RX ORDER — OXCARBAZEPINE 600 MG/1
1 TABLET, FILM COATED ORAL
COMMUNITY
Start: 2025-01-07

## 2025-01-13 RX ORDER — CIPROFLOXACIN 2 MG/ML
400 INJECTION, SOLUTION INTRAVENOUS ONCE
Status: COMPLETED | OUTPATIENT
Start: 2025-01-13 | End: 2025-01-13

## 2025-01-13 RX ORDER — HYDROMORPHONE HYDROCHLORIDE 1 MG/ML
0.5 INJECTION, SOLUTION INTRAMUSCULAR; INTRAVENOUS; SUBCUTANEOUS
Status: DISCONTINUED | OUTPATIENT
Start: 2025-01-13 | End: 2025-01-13

## 2025-01-13 RX ORDER — NALOXONE HYDROCHLORIDE 0.4 MG/ML
0.2 INJECTION, SOLUTION INTRAMUSCULAR; INTRAVENOUS; SUBCUTANEOUS
Status: DISCONTINUED | OUTPATIENT
Start: 2025-01-13 | End: 2025-01-14 | Stop reason: HOSPADM

## 2025-01-13 RX ORDER — KETOROLAC TROMETHAMINE 15 MG/ML
15 INJECTION, SOLUTION INTRAMUSCULAR; INTRAVENOUS ONCE
Status: COMPLETED | OUTPATIENT
Start: 2025-01-13 | End: 2025-01-13

## 2025-01-13 RX ORDER — OXCARBAZEPINE 600 MG/1
600 TABLET, FILM COATED ORAL 2 TIMES DAILY
Status: DISCONTINUED | OUTPATIENT
Start: 2025-01-13 | End: 2025-01-14 | Stop reason: HOSPADM

## 2025-01-13 RX ORDER — CALCIUM CARBONATE 500 MG/1
1000 TABLET, CHEWABLE ORAL 4 TIMES DAILY PRN
Status: DISCONTINUED | OUTPATIENT
Start: 2025-01-13 | End: 2025-01-14 | Stop reason: HOSPADM

## 2025-01-13 RX ORDER — HYDROMORPHONE HYDROCHLORIDE 1 MG/ML
0.5 INJECTION, SOLUTION INTRAMUSCULAR; INTRAVENOUS; SUBCUTANEOUS
Status: DISCONTINUED | OUTPATIENT
Start: 2025-01-13 | End: 2025-01-14 | Stop reason: HOSPADM

## 2025-01-13 RX ORDER — NALOXONE HYDROCHLORIDE 0.4 MG/ML
0.4 INJECTION, SOLUTION INTRAMUSCULAR; INTRAVENOUS; SUBCUTANEOUS
Status: DISCONTINUED | OUTPATIENT
Start: 2025-01-13 | End: 2025-01-14 | Stop reason: HOSPADM

## 2025-01-13 RX ORDER — AMOXICILLIN 250 MG
1 CAPSULE ORAL 2 TIMES DAILY PRN
Status: DISCONTINUED | OUTPATIENT
Start: 2025-01-13 | End: 2025-01-14 | Stop reason: HOSPADM

## 2025-01-13 RX ORDER — LEVETIRACETAM 750 MG/1
3000 TABLET, FILM COATED, EXTENDED RELEASE ORAL DAILY
Status: DISCONTINUED | OUTPATIENT
Start: 2025-01-13 | End: 2025-01-14 | Stop reason: HOSPADM

## 2025-01-13 RX ADMIN — OXCARBAZEPINE 600 MG: 600 TABLET, FILM COATED ORAL at 20:47

## 2025-01-13 RX ADMIN — HYDROMORPHONE HYDROCHLORIDE 0.5 MG: 1 INJECTION, SOLUTION INTRAMUSCULAR; INTRAVENOUS; SUBCUTANEOUS at 03:16

## 2025-01-13 RX ADMIN — CIPROFLOXACIN 400 MG: 400 INJECTION, SOLUTION INTRAVENOUS at 04:04

## 2025-01-13 RX ADMIN — CIPROFLOXACIN 400 MG: 400 INJECTION, SOLUTION INTRAVENOUS at 15:40

## 2025-01-13 RX ADMIN — LEVETIRACETAM 3000 MG: 750 TABLET, FILM COATED, EXTENDED RELEASE ORAL at 08:45

## 2025-01-13 RX ADMIN — KETOROLAC TROMETHAMINE 15 MG: 15 INJECTION, SOLUTION INTRAMUSCULAR; INTRAVENOUS at 03:16

## 2025-01-13 RX ADMIN — OXCARBAZEPINE 600 MG: 600 TABLET, FILM COATED ORAL at 12:11

## 2025-01-13 ASSESSMENT — ACTIVITIES OF DAILY LIVING (ADL)
ADLS_ACUITY_SCORE: 41
ADLS_ACUITY_SCORE: 54
ADLS_ACUITY_SCORE: 41
ADLS_ACUITY_SCORE: 54
ADLS_ACUITY_SCORE: 41
ADLS_ACUITY_SCORE: 54
ADLS_ACUITY_SCORE: 41
ADLS_ACUITY_SCORE: 54
ADLS_ACUITY_SCORE: 41
ADLS_ACUITY_SCORE: 41
ADLS_ACUITY_SCORE: 54
ADLS_ACUITY_SCORE: 41

## 2025-01-13 NOTE — MEDICATION SCRIBE - ADMISSION MEDICATION HISTORY
Medication Scribe Admission Medication History    Admission medication history is complete. The information provided in this note is only as accurate as the sources available at the time of the update.    Information Source(s): Patient via in-person    Pertinent Information: Spoke with patient in person and completed medication hx.   Patient states that he is currently not on any antibiotics.     Changes made to PTA medication list:  Added: Oxcarbazepine 600 MG Tab  Deleted: Oxcarbazepine ER 24  MG 23 Hr Tab            Amoxicillin 875-125 MG Tab  Changed: Cranberry 500 MG Caps: Take 650 mg by mouth. >>> Cranberry 500 MG Caps: Take 650 mg by mouth daily.     Keppra  MG 24 Hr Tab: Take 3,000 mg by mouth.>>> Keppra  MG 24 Hr Tab: Take 3,000 mg by mouth daily.    Allergies reviewed with patient and updates made in EHR: no    Medication History Completed By: Karla Pittman 1/13/2025 10:12 AM    PTA Med List   Medication Sig Last Dose/Taking    Cranberry 500 MG CAPS Take 650 mg by mouth daily. 1/12/2025    levETIRAcetam (KEPPRA XR) 500 MG 24 hr tablet Take 3,000 mg by mouth daily. 1/12/2025    OXcarbazepine (TRILEPTAL) 600 MG tablet Take 1 tablet by mouth 2 times daily. 1/12/2025

## 2025-01-13 NOTE — CONSULTS
ADDENDUM 1/15/25 -    Dr. Blunt informed me that he reviewed the patient's CT films with Dr. Castillo, with particular focus on the bladder stone and left kidney drainage.  They feel that the stone is not in the left ureter.  Given this, would not consider left PNT at this time.  Instead will proceed with urodynamics to evaluate bladder compliance and rule out spasticity as a cause for worsening bilateral hydronephrosis.     [facility]    Urology Consult History and Physical    Name: Olu Trinh    MRN: [id number]   YOB: 1985       We were asked to see Olu Trinh at the request of Ms. Enrike GOMEZ CNP for evaluation and treatment of the following chief complaint:          Chief Complaint:   Pyelonephritis  History is obtained from patient and review of records           History of Present Illness:   Olu Trinh is a 39 year old male with pmh significant for neurogenic bladder secondary to bladder/cloacal exstrophy as well as bladder stones s/p multiple stone removal procedures in the past (9/9/22, 2021, 2018 Ibrahim, 2015 percutaneous).  He also has chronic bilateral hydronephrosis and CKD.  He caths per Mitrofanoff stoma every 3-4 hours into an augmented bladder.  He uses a 14Fr catheter typically, but uses a 16Fr when he irrigates his bladder, typically ~3 times every 2 weeks when he notices his urine is gunky.     He has had ~3d Hx of left flank pain that worsened and became 10/10 yesterday and significant shortness of breath, all prompting an ED visit.  He has now been admitted and started on Cipro IV 400mg every 12 hours.  He is already feeling much much better.  His left flank pain is almost nonexistent.  His SOB is improved considerably.  He does have some very mild right flank pain.  No fevers or chills.  Eating OK.  Wonders when he might be able to go home.   Catheterizing - normal.  He will leak via urethra if his bladder gets too full, but  this is typical for him  Bowels: Normal    VItals: Normal  Labs: SCr 1.34mg/dL (at baseline), WBC 13.2 --> 11.5.  UA: (WBC 75, RBC >182, positive nitrites).  Urine culture pending.   CT Imaging: Chronic severe bilateral hydroureteronephrosis without obstructing ureterolithiasis. There is however new left perinephric and periureteral fat stranding which is suspicious for ascending urinary tract infection. Stone within the dependent urinary bladder measuring 13 x 7  mm. Small nonobstructing renal calyceal calculi.    Per review of EMR he was seen in November by Dr. Wayne for cystoscopy to search for a bladder stone (12x8mm) that was seen on a CT from 7/30/23.  The stone was unable to be seen and he has a repeat cysto scheduled on 5/19/25.              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    Learning disability     Neurogenic bladder, NOS 10/30/2012    Obesity (BMI 35.0-39.9 without comorbidity)     Other hydronephrosis 06/02/2015    Unspecified epilepsy with intractable epilepsy 10/22/2013            Past Surgical History:     Past Surgical History:   Procedure Laterality Date    BLADDER AUGMENTATION      bladder neck reconstruction      Mikey Mayra [facility]    CYSTOSCOPY N/A 07/10/2018    Procedure: CYSTOSCOPY;  CYSTOSCOPY  OF NEOBLADDER  ;  Surgeon: Mario Ibrahim MD;  Location:  OR    CYSTOSCOPY VIA MITROFANOFF N/A 1/29/2021    Procedure: CYSTOSCOPY, VIA MITROFANOFF;  Surgeon: Joel Blunt MD;  [place] [facility] [place]    LASER HOLMIUM LITHOTRIPSY BLADDER N/A 07/24/2015    Procedure: LASER HOLMIUM LITHOTRIPSY BLADDER;  Surgeon: Joel Blunt MD;  Location: [facility] OR    LASER HOLMIUM LITHOTRIPSY BLADDER N/A 07/10/2018    Procedure: LASER HOLMIUM LITHOTRIPSY BLADDER;;  Surgeon: Mario Ibrahim MD;  Location:  OR    LEFT TEMPORAL CRANIOTOMY FOR  ANTERIOR TEMPORAL LOBECTOMY  10/01/2018    MITROFANOFF PROCEDURE (APPENDIX CONDUIT)  01/01/2002    PERCUTANEOUS CYSTOLITHOTOMY N/A 9/9/2022    Procedure: CYSTOLITHOTOMY, WITH LASER STANDBY;  Surgeon: Manjit Nicolas MD;  Location: [facility] [place]    [facility] REMOVAL OF KIDNEY STONE              Social History:     Social History     Tobacco Use    Smoking status: Never    Smokeless tobacco: Never   Substance Use Topics    Alcohol use: Yes     Comment: Alcoholic beverage once or twice per week.            Family History:     Family History   Problem Relation Age of Onset    Seizure Disorder Mother     Breast Cancer Mother     Cancer Father             Allergies:     Allergies   Allergen Reactions    Ondansetron      Hallucinations    Sulfa Antibiotics      unknown            Medications:     Current Facility-Administered Medications   Medication Dose Route Frequency Provider Last Rate Last Admin    calcium carbonate (TUMS) chewable tablet 1,000 mg  1,000 mg Oral 4x Daily PRN Babatunde Calvert APRN CNP        ciprofloxacin (CIPRO) infusion 400 mg  400 mg Intravenous Q12H Babatunde Calvert APRN CNP        HYDROmorphone (PF) (DILAUDID) injection 0.5 mg  0.5 mg Intravenous Q3H PRN Babatunde Calvert APRN CNP        levETIRAcetam (KEPPRA XR) 24 hr tablet 3,000 mg  3,000 mg Oral Daily Babatunde Calvert APRN CNP   3,000 mg at 01/13/25 0845    lidocaine (LMX4) cream   Topical Q1H PRN Babatunde Calvert APRN CNP        lidocaine 1 % 0.1-1 mL  0.1-1 mL Other Q1H PRN Babatunde Calvert APRN CNP        naloxone (NARCAN) injection 0.2 mg  0.2 mg Intravenous Q2 Min PRN Emilie Flores MD        Or    naloxone (NARCAN) injection 0.4 mg  0.4 mg Intravenous Q2 Min PRN Emilie Flores MD        Or    naloxone (NARCAN) injection 0.2 mg  0.2 mg Intramuscular Q2 Min PRN Emilie Flores MD        Or    naloxone (NARCAN) injection 0.4 mg  0.4 mg Intramuscular Q2 Min PRN Sandra  "Emilie Handley MD        OXcarbazepine (TRILEPTAL) tablet 600 mg  600 mg Oral BID Babatunde Calvert APRN CNP        senna-docusate (SENOKOT-S/PERICOLACE) 8.6-50 MG per tablet 1 tablet  1 tablet Oral BID PRN Babatunde Calvert APRN CNP        Or    senna-docusate (SENOKOT-S/PERICOLACE) 8.6-50 MG per tablet 2 tablet  2 tablet Oral BID PRN Babatunde Calvert APRN CNP        sodium chloride (PF) 0.9% PF flush 3 mL  3 mL Intracatheter Q8H Babatunde Calvert APRN CNP        sodium chloride (PF) 0.9% PF flush 3 mL  3 mL Intracatheter q1 min prBabatunde Romano APRN CNP         Current Outpatient Medications   Medication Sig Dispense Refill    Cranberry 500 MG CAPS Take 650 mg by mouth daily.      levETIRAcetam (KEPPRA XR) 500 MG 24 hr tablet Take 3,000 mg by mouth daily.      OXcarbazepine (TRILEPTAL) 600 MG tablet Take 1 tablet by mouth 2 times daily.               Review of Systems:    ROS: See HPI for pertinent details.  Remainder of 10-point ROS negative.         Physical Exam:   VS:  T: 97.5    HR: 67    BP: 137/87    RR: 18   GEN:  AOx3.  NAD.  Pleasant.  HEENT:  Sclerae anicteric.  Conjunctivae pink.  Moist mucous membranes  LUNGS: Non-labored breathing.  BACK:  + mild right CVAT.  No left CVAT  ABD:  Soft.  NT.  Mildly distended.    EXT:  Warm, well perfused.    SKIN:  Warm.  Dry.  No rashes.          Data:   All laboratory data reviewed:    No results found for: \"PSA\"  Recent Labs   Lab 01/13/25  0616 01/13/25  0135   WBC 11.5* 13.2*   HGB 14.6 15.7    233       Recent Labs   Lab 01/13/25  0616 01/13/25  0135    139   POTASSIUM 3.7 3.6   CHLORIDE 109* 106   CO2 17* 19*   BUN 27.1* 27.0*   CR 1.34* 1.34*   * 122*   MIO 9.2 9.4       Recent Labs   Lab 01/13/25  0145   COLOR Straw   APPEARANCE Slightly Cloudy*   URINEGLC Negative   URINEBILI Negative   URINEKETONE Negative   SG 1.011   URINEPH 7.0   PROTEIN 600*   NITRITE Positive*   LEUKEST Moderate*   RBCU " >182*   WBCU 75*     Results for orders placed or performed in visit on 09/12/24   Urine Culture    Collection Time: 09/12/24  3:31 PM    Specimen: Urine, NOS   Result Value Ref Range    Culture No Growth    Results for orders placed or performed during the hospital encounter of 08/30/24   Urine Culture    Collection Time: 08/30/24  8:51 AM    Specimen: Urine, Catheter   Result Value Ref Range    Culture >100,000 CFU/mL Citrobacter freundii complex (A)     Culture >100,000 CFU/mL Citrobacter freundii complex (A)     Culture 50,000-100,000 CFU/mL Citrobacter freundii complex (A)        Susceptibility    Citrobacter freundii complex - LILLY*     Ampicillin*  Resistant       * Intrinsically Resistant     Ampicillin/ Sulbactam*  Resistant       * Intrinsically Resistant     Piperacillin/Tazobactam  Resistant      Cefazolin*  Resistant       * Cefazolin LILLY breakpoints are for the treatment of uncomplicated urinary tract infections. For the treatment of systemic infections, please contact the laboratory for additional testing.  Intrinsically Resistant     Cefoxitin*  Resistant       * Intrinsically Resistant     Ceftazidime  Resistant      Ceftriaxone  Resistant      Cefepime <=1 Susceptible ug/mL     Gentamicin <=1 Susceptible ug/mL     Tobramycin <=1 Susceptible ug/mL     Ciprofloxacin <=0.25 Susceptible ug/mL     Levofloxacin <=0.12 Susceptible ug/mL     Nitrofurantoin 64 Intermediate ug/mL     Trimethoprim/Sulfamethoxazole <=1/19 Susceptible ug/mL    Citrobacter freundii complex - LILLY*     Ampicillin*  Resistant       * Intrinsically Resistant     Ampicillin/ Sulbactam*  Resistant       * Intrinsically Resistant     Piperacillin/Tazobactam  Resistant      Cefazolin*  Resistant       * Cefazolin LILLY breakpoints are for the treatment of uncomplicated urinary tract infections. For the treatment of systemic infections, please contact the laboratory for additional testing.  Intrinsically Resistant     Cefoxitin*  Resistant        * Intrinsically Resistant     Ceftazidime  Resistant      Ceftriaxone  Resistant      Cefepime <=1 Susceptible ug/mL     Gentamicin <=1 Susceptible ug/mL     Tobramycin <=1 Susceptible ug/mL     Ciprofloxacin <=0.25 Susceptible ug/mL     Levofloxacin <=0.12 Susceptible ug/mL     Nitrofurantoin 64 Intermediate ug/mL     Trimethoprim/Sulfamethoxazole <=1/19 Susceptible ug/mL    Citrobacter freundii complex - LILLY*     Ampicillin*  Resistant       * Intrinsically Resistant     Ampicillin/ Sulbactam*  Resistant       * Intrinsically Resistant     Piperacillin/Tazobactam  Resistant      Cefazolin*  Resistant       * Cefazolin LILLY breakpoints are for the treatment of uncomplicated urinary tract infections. For the treatment of systemic infections, please contact the laboratory for additional testing.  Intrinsically Resistant     Cefoxitin*  Resistant       * Intrinsically Resistant     Ceftazidime  Resistant      Ceftriaxone  Resistant      Cefepime 2 Susceptible ug/mL     Gentamicin <=1 Susceptible ug/mL     Tobramycin <=1 Susceptible ug/mL     Ciprofloxacin <=0.25 Susceptible ug/mL     Levofloxacin <=0.12 Susceptible ug/mL     Nitrofurantoin 64 Intermediate ug/mL     Trimethoprim/Sulfamethoxazole <=1/19 Susceptible ug/mL     * Enterobacter cloacae, Klebsiella aerogenes, and Citrobacter freundii have moderate to high levels of inducible AmpC ß-lactamase expression. The use of 3rd generation cephalosporins including ceftriaxone and ceftazidime, as well as piperacillin-tazobactam, should be avoided for invasive infections, regardless of susceptibility results.     Enterobacter cloacae, Klebsiella aerogenes, and Citrobacter freundii have moderate to high levels of inducible AmpC ß-lactamase expression. The use of 3rd generation cephalosporins including ceftriaxone and ceftazidime, as well as piperacillin-tazobactam, should be avoided for invasive infections, regardless of susceptibility results.     Enterobacter  cloacae, Klebsiella aerogenes, and Citrobacter freundii have moderate to high levels of inducible AmpC ß-lactamase expression. The use of 3rd generation cephalosporins including ceftriaxone and ceftazidime, as well as piperacillin-tazobactam, should be avoided for invasive infections, regardless of susceptibility results.       All pertinent imaging reviewed:  EXAM: CT ABDOMEN PELVIS W/O CONTRAST  LOCATION: [facility]  DATE: 1/13/2025     INDICATION: L flank pain  COMPARISON: CT abdomen without contrast 07/30/2023  TECHNIQUE: CT scan of the abdomen and pelvis was performed without IV contrast. Multiplanar reformats were obtained. Dose reduction techniques were used.  CONTRAST: None.     FINDINGS:   LOWER CHEST: Mild scarring at the lingula.  HEPATOBILIARY: Normal.  PANCREAS: Normal.  SPLEEN: Normal.  ADRENAL GLANDS: Normal.     KIDNEYS/BLADDER: Chronic severe bilateral hydroureteronephrosis without obstructing ureterolithiasis. There is however new left perinephric and periureteral fat stranding which are suspicious for ascending urinary tract infection. Postsurgical changes of   bladder exstrophy repair with Mitrofanoff procedure and right lower quadrant urostomy. Urinary bladder is diffusely thick-walled and there is some gas noted within the antidependent bladder. Stone within the dependent urinary bladder measuring 13 x 7   mm. Small nonobstructing renal calyceal calculi.     BOWEL: Colonic diverticulosis. Appendectomy. Partial small bowel resection. No bowel obstruction.     LYMPH NODES: Shotty retroperitoneal lymph nodes, likely reactive.     VASCULATURE: No AAA.     PELVIC ORGANS: No pelvic masses.     MUSCULOSKELETAL: Dense bones which may be seen with renal osteodystrophy. No acute osseous abnormality or suspicious bony lesion.                                                                      IMPRESSION:   1.  Postsurgical changes of bladder exstrophy repair  with Mitrofanoff procedure and right lower quadrant urostomy. Urinary bladder is diffusely thick-walled and there is some gas noted within the antidependent bladder. Findings may be seen with cystitis   and correlation with urinalysis is recommended. Stone within the dependent urinary bladder measuring 13 x 7 mm.  2.  Chronic severe bilateral hydroureteronephrosis without obstructing ureterolithiasis. There is however new left perinephric and periureteral fat stranding which is suspicious for ascending urinary tract infection.  3.  Small nonobstructing renal calyceal calculi.         Impression and Plan:   Impression / Plan:   Olu Trinh is a 39 year old male with complicated urologic PMH, as above, who was admitted overnight with pyelonephritis primarily left-sided but likely bilateral. Seems to be responding appropriately to Cipro.      Also has a 1.3cm bladder stone, tiny bl nonobstructing renal stones, and significant BL hydronephrosis (similar to previous) and stable CKD (SCr 1.3 - 1.4)      It is interesting that the stone seen on the 7/30/23 CT was unable to be seen on cystoscopy.  This calls into question whether the stone could potentially be somewhere else (intramural, within the bladder versus in the distal ureter?).  It is also evident that the hydronephrosis has been worsening over the years for unclear reasons.  This seems to track with the SCr, which is also slowly changing.    RECS:  - Agree with antibiotics to treat for pyelonephritis.  Cultures pending, but it seems Chris is doing well on the Cipro   - Continue catheterizing per home regimen  - Continue irrigating at least once daily.    - Dr. Blunt will discuss the recent CT with our endourology team.  There may be an option to consider left percutaneous nephrostomy tube in the future if there is concern for ureteral obstruction on that side.  This would be nonurgent, likely in the outpatient setting  - We will also plan for  urodynamics in the outpatient setting, again to look for reasons for worsening hydronephrosis (such as hostile bladder with overactivity vs poor compliance)  - Pt does have an appointment with Benson Wayne MD in May 2025    Urology will sign off  Discussed with Dr. Blunt     Thank you for the opportunity to participate in the care of Olu Benson Trinh.     Caroline Macias PA-C  Urology Physician Assistant  Personal Pager: [phone]    Please call Job Code:   [phone] to reach the Urology resident or PA on call - Weekdays  [phone] to reach the Urology resident or PA on call - Weeknights and weekends

## 2025-01-13 NOTE — PROGRESS NOTES
Call received from patient after message left. Offered 1/22/25 at 7 am for UDS. He accepts this appointment.      Thank you,  Dipika Berger RN, BSN   Urology Triage Nurse

## 2025-01-13 NOTE — ED PROVIDER NOTES
"    [place] EMERGENCY DEPARTMENT ([facility])    1/13/25       ED PROVIDER NOTE       History   No chief complaint on file.    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     {History Review Selection (Optional):640731}  {ROS Selection (Optional):918611}    Physical Exam      Physical Exam  ***    ED Course, Procedures, & Data      Procedures       {ED Course Selections (Optional):056210}  {ED Sepsis CMS Documentation (Optional):445154::\" \"}       No results found for any visits on 01/13/25.  Medications - No data to display  Labs Ordered and Resulted from Time of ED Arrival to Time of ED Departure - No data to display  No orders to display          {Critical Care Performed?:516590}    Assessment & Plan    ***    I have reviewed the nursing notes. I have reviewed the findings, diagnosis, plan and need for follow up with the patient.    New Prescriptions    No medications on file       Final diagnoses:   None       ***  [facility] EMERGENCY DEPARTMENT  1/13/2025  "

## 2025-01-13 NOTE — H&P
[facility]    History and Physical - Hospitalist Service, GOLD TEAM 5       Date of Admission:  1/13/2025    Assessment & Plan      Olu Trinh is a 39 year old male admitted on 1/13/2025. 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 He presents to the ED with concerns for a UTI.    # Acute Pyelonephritis  # Neurogenic bladder secondary to bladder/cloacal exstrophy s/p mitrofanoff  # Chronic urinary stones  # Chronic bilateral hydronephrosis  Has had bladder stones removed in past (2021, 2018 Dr. Ibrahim, 2015 percutaneous). Has chronic bilateral hydronephrosis.  He self  per mitrofanoff stoma into augmented bladder with 14fr straight cath q3-4h. Tonight, he presents with left hip pain and shortness of breath that he says is typical, along with back pain, for UTIs for him. He says that these symptoms resolve quickly with IV antibiotics. He also noticed that his urine is cloudy. CT tonight showing known surgical changes, and urinary bladder is diffusely thick-walled and there is some gas noted within the antidependent bladder. Chronic severe bilateral hydroureteronephrosis without obstructing ureterolithiasis. There is however new left perinephric and periureteral fat stranding which is suspicious for ascending urinary tract infection. WBC 13.2, UA positive nitrite, large blood and moderate LE. He was started on Cipro in the ED and given a dose of dilaudid and Ketoralac. Blood culture drawn.  - continue Cipro  - follow urine and blood culture  - urology consult  - Self cath per home routine  - Tylenol for pain  - He has 2 doses left of the 3 doses ordered by ED    # CKD III  Creatinine is 1.43 and GFR 69 which is better than a few months ago when his creatinine was 1.68 and GFR 53. Overall, creatinine is usually around 1.4.  - BMP      # Epilepsy  Last seizure was 2 years ago. His oxcarbazepine was changes from   - continue home keppra and oxcarbazepine  - Seizure precautions     Diet: Combination Diet Regular Diet Adult  DVT Prophylaxis: Ambulate every shift  Shi Catheter: Not present  Lines: None     Cardiac Monitoring: None  Code Status: Full Code      Disposition Plan   Medically Ready for Discharge: Anticipated Tomorrow    The patient's care was discussed with the Attending Physician, Dr. Flores .    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 He presents to the ED with concerns for a UTI. As noted above, he has an extensive urologic history and followed closely. He has had previous admission for UTIs/pyelonephritis and aware of his symptoms at the beginning of an infection.He also states that he responds well to IV antibiotics.  He self cath and has no concerns with cath procedure.       Past Medical History    Past Medical History:   Diagnosis Date    Attention deficit disorder with hyperactivity(314.01)     Bladder stone 06/02/2015    Exstrophy of bladder sequence 06/29/2015    Learning disability     Neurogenic bladder, NOS 10/30/2012    Obesity (BMI 35.0-39.9 without comorbidity)     Other hydronephrosis 06/02/2015    Unspecified epilepsy with intractable epilepsy 10/22/2013       Past Surgical History   Past  Surgical History:   Procedure Laterality Date    BLADDER AUGMENTATION      bladder neck reconstruction      Mikey Glez    CYSTOSCOPY N/A 07/10/2018    Procedure: CYSTOSCOPY;  CYSTOSCOPY  OF NEOBLADDER  ;  Surgeon: Mario Ibrahim MD;  Location: [facility] OR    CYSTOSCOPY VIA MITROFANOFF N/A 2021    Procedure: CYSTOSCOPY, VIA MITROFANOFF;  Surgeon: Joel Blunt MD;  Location: [facility] OR    LASER HOLMIUM LITHOTRIPSY BLADDER N/A 2015    Procedure: LASER HOLMIUM LITHOTRIPSY BLADDER;  Surgeon: Joel Blunt MD;  Location: [facility] OR    LASER HOLMIUM LITHOTRIPSY BLADDER N/A 07/10/2018    Procedure: LASER HOLMIUM LITHOTRIPSY BLADDER;;  Surgeon: Mario Ibrahim MD;  Location:  OR    LEFT TEMPORAL CRANIOTOMY FOR ANTERIOR TEMPORAL LOBECTOMY  10/01/2018    MITROFANOFF PROCEDURE (APPENDIX CONDUIT)  2002    PERCUTANEOUS CYSTOLITHOTOMY N/A 2022    Procedure: CYSTOLITHOTOMY, WITH LASER STANDBY;  Surgeon: Manjit Nicolas MD;  Location: [facility] OR    [facility] REMOVAL OF KIDNEY STONE         Prior to Admission Medications   Prior to Admission Medications   Prescriptions Last Dose Informant Patient Reported? Taking?   Cranberry 500 MG CAPS  Self Yes No   Sig: Take 650 mg by mouth   OXcarbazepine ER 24hour (OXTELLAR XR) 600 MG 24 hr tablet   Yes No   Si.5 tablet (900 mg)   amoxicillin-clavulanate (AUGMENTIN) 875-125 MG tablet   No No   Sig: Take 1 tablet by mouth 2 times daily   levETIRAcetam (KEPPRA XR) 500 MG 24 hr tablet   Yes No   Sig: Take 3,000 mg by mouth      Facility-Administered Medications: None        Review of Systems    The 10 point Review of Systems is negative other than noted in the HPI or here.     Physical Exam   Vital Signs: Temp: 98  F (36.7  C) Temp src: Axillary BP: (!) 136/93 Pulse: 77   Resp: 25 SpO2: 99 % O2 Device: None (Room air)    Weight: 300 lbs 0 oz    Physical Exam   Constitutional:   Well nourished, well developed, resting comfortably   Cardiovascular: Regular  rate and rhythm without murmurs or gallops  Pulmonary/Chest: Clear to auscultation bilaterally, with no wheezes or retractions. No respiratory distress.  GI: Soft with good bowel sounds.  Non-tender, non-distended, with no guarding, no rebound, no peritoneal signs. Mitrofanoff site without redness or drainage.  Musculoskeletal:  No edema or clubbing   Skin: Skin is warm and dry. No rash noted.   Neurological: Alert and oriented to person, place, and time. Nonfocal exam  Psychiatric:  Normal mood and affect.    Medical Decision Making   55 MINUTES SPENT BY ME on the date of service doing chart review, history, exam, documentation & further activities per the note.      Data     I have personally reviewed the following data over the past 24 hrs:    13.2 (H)  \   15.7   / 233     139 106 27.0 (H) /  122 (H)   3.6 19 (L) 1.34 (H) \     ALT: 23 AST: 17 AP: 54 TBILI: 0.4   ALB: 3.8 TOT PROTEIN: 6.8 LIPASE: 59     Procal: N/A CRP: N/A Lactic Acid: 1.2         Imaging results reviewed over the past 24 hrs:   Recent Results (from the past 24 hours)   CT Abdomen Pelvis w/o Contrast    Narrative    EXAM: CT ABDOMEN PELVIS W/O CONTRAST  LOCATION: [facility]  DATE: 1/13/2025    INDICATION: L flank pain  COMPARISON: CT abdomen without contrast 07/30/2023  TECHNIQUE: CT scan of the abdomen and pelvis was performed without IV contrast. Multiplanar reformats were obtained. Dose reduction techniques were used.  CONTRAST: None.    FINDINGS:   LOWER CHEST: Mild scarring at the lingula.    HEPATOBILIARY: Normal.    PANCREAS: Normal.    SPLEEN: Normal.    ADRENAL GLANDS: Normal.    KIDNEYS/BLADDER: Chronic severe bilateral hydroureteronephrosis without obstructing ureterolithiasis. There is however new left perinephric and periureteral fat stranding which are suspicious for ascending urinary tract infection. Postsurgical changes of   bladder exstrophy repair with Mitrofanoff procedure and  right lower quadrant urostomy. Urinary bladder is diffusely thick-walled and there is some gas noted within the antidependent bladder. Stone within the dependent urinary bladder measuring 13 x 7   mm. Small nonobstructing renal calyceal calculi.    BOWEL: Colonic diverticulosis. Appendectomy. Partial small bowel resection. No bowel obstruction.    LYMPH NODES: Shotty retroperitoneal lymph nodes, likely reactive.    VASCULATURE: No AAA.    PELVIC ORGANS: No pelvic masses.    MUSCULOSKELETAL: Dense bones which may be seen with renal osteodystrophy. No acute osseous abnormality or suspicious bony lesion.      Impression    IMPRESSION:   1.  Postsurgical changes of bladder exstrophy repair with Mitrofanoff procedure and right lower quadrant urostomy. Urinary bladder is diffusely thick-walled and there is some gas noted within the antidependent bladder. Findings may be seen with cystitis   and correlation with urinalysis is recommended. Stone within the dependent urinary bladder measuring 13 x 7 mm.  2.  Chronic severe bilateral hydroureteronephrosis without obstructing ureterolithiasis. There is however new left perinephric and periureteral fat stranding which is suspicious for ascending urinary tract infection.  3.  Small nonobstructing renal calyceal calculi.

## 2025-01-13 NOTE — PROGRESS NOTES
Mr. Trinh is doing better today. He continues to report some flank pain but overall is eating and drinking better and reports minimal pain overall. We reviewed his lab results and that we are awaiting urine culture results before switching to oral antibiotics and he will likely be able to discharge tomorrow pending those culture results. He denies any other concerns at this time.     Please see the full H&P from today for more details.      JADIEL Fernandes

## 2025-01-13 NOTE — ED PROVIDER NOTES
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 Sunday morning he noted foul odor of his urine and red sediment but not gross blood. He endorses nausea and left flank pain as well. He denies dyspnea, groin pain, and chest pain. Per chart review, He has recurrent UTIs with antibiotic resistant organisms, including citrobacter freundii complex.           Physical Exam   BP: (!) 136/93  Pulse: 77  Temp: 98  F (36.7  C)  Resp: 25  Height: 182.9 cm (6')  Weight: 136.1 kg (300 lb)  SpO2: 99 %  Physical Exam  General: In moderate distress. Pacing the hallway in pain  Cardiovascular: RRR w/ no murmurs, rubs, or gallops. Peripheral pulses palpable and symmetric.  Respiratory: Tachypnea. Clear to auscultation   GI: + left CVA tenderness. Tenderness to LLQ and LUQ. No rigidity or rebound tenderness.  Eyes: Sclera antiicteric   Neuro: No focal neurological deficits   Psych: Normal affect      ED Course, Procedures, & Data         Results for orders placed or performed during the hospital encounter of 01/13/25   CT Abdomen Pelvis w/o Contrast     Status: None    Narrative    EXAM: CT ABDOMEN PELVIS W/O CONTRAST  LOCATION: [facility]  DATE: 1/13/2025    INDICATION: L flank pain  COMPARISON: CT abdomen without contrast 07/30/2023  TECHNIQUE: CT scan of the abdomen and pelvis was performed without IV contrast. Multiplanar reformats were obtained. Dose reduction techniques were used.  CONTRAST:  None.    FINDINGS:   LOWER CHEST: Mild scarring at the lingula.    HEPATOBILIARY: Normal.    PANCREAS: Normal.    SPLEEN: Normal.    ADRENAL GLANDS: Normal.    KIDNEYS/BLADDER: Chronic severe bilateral hydroureteronephrosis without obstructing ureterolithiasis. There is however new left perinephric and periureteral fat stranding which are suspicious for ascending urinary tract infection. Postsurgical changes of   bladder exstrophy repair with Mitrofanoff procedure and right lower quadrant urostomy. Urinary bladder is diffusely thick-walled and there is some gas noted within the antidependent bladder. Stone within the dependent urinary bladder measuring 13 x 7   mm. Small nonobstructing renal calyceal calculi.    BOWEL: Colonic diverticulosis. Appendectomy. Partial small bowel resection. No bowel obstruction.    LYMPH NODES: Shotty retroperitoneal lymph nodes, likely reactive.    VASCULATURE: No AAA.    PELVIC ORGANS: No pelvic masses.    MUSCULOSKELETAL: Dense bones which may be seen with renal osteodystrophy. No acute osseous abnormality or suspicious bony lesion.      Impression    IMPRESSION:   1.  Postsurgical changes of bladder exstrophy repair with Mitrofanoff procedure and right lower quadrant urostomy. Urinary bladder is diffusely thick-walled and there is some gas noted within the antidependent bladder. Findings may be seen with cystitis   and correlation with urinalysis is recommended. Stone within the dependent urinary bladder measuring 13 x 7 mm.  2.  Chronic severe bilateral hydroureteronephrosis without obstructing ureterolithiasis. There is however new left perinephric and periureteral fat stranding which is suspicious for ascending urinary tract infection.  3.  Small nonobstructing renal calyceal calculi.     [place]     Status: None    Narrative    The following orders were created for panel order [place].  Procedure                               Abnormality         Status                      ---------                               -----------         ------                     Extra Blue Top Tube[[id number]]                              Final result               Extra Red Top Tube[[id number]]                               Final result               Extra Green Top (Lithium...[[id number]]                      Final result               Extra Purple Top Tube[[id number]]                            Final result               Extra Green Top (Lithium...[[id number]]                      Final result                 Please view results for these tests on the individual orders.   Extra Blue Top Tube     Status: None   Result Value Ref Range    Hold Specimen JIC    Extra Red Top Tube     Status: None   Result Value Ref Range    Hold Specimen JIC    Extra Green Top (Lithium Heparin) Tube     Status: None   Result Value Ref Range    Hold Specimen JIC    Extra Purple Top Tube     Status: None   Result Value Ref Range    Hold Specimen JIC    Extra Green Top (Lithium Heparin) ON ICE     Status: None   Result Value Ref Range    Hold Specimen JIC    Comprehensive metabolic panel     Status: Abnormal   Result Value Ref Range    Sodium 139 135 - 145 mmol/L    Potassium 3.6 3.4 - 5.3 mmol/L    Carbon Dioxide (CO2) 19 (L) 22 - 29 mmol/L    Anion Gap 14 7 - 15 mmol/L    Urea Nitrogen 27.0 (H) 6.0 - 20.0 mg/dL    Creatinine 1.34 (H) 0.67 - 1.17 mg/dL    GFR Estimate 69 >60 mL/min/1.73m2    Calcium 9.4 8.8 - 10.4 mg/dL    Chloride 106 98 - 107 mmol/L    Glucose 122 (H) 70 - 99 mg/dL    Alkaline Phosphatase 54 40 - 150 U/L    AST 17 0 - 45 U/L    ALT 23 0 - 70 U/L    Protein Total 6.8 6.4 - 8.3 g/dL    Albumin 3.8 3.5 - 5.2 g/dL    Bilirubin Total 0.4 <=1.2 mg/dL   Lipase     Status: Normal   Result Value Ref Range    Lipase 59 13 - 60 U/L   UA with Microscopic reflex to Culture     Status: Abnormal    Specimen: Urine, Midstream   Result Value Ref Range    Color Urine Straw Colorless, Straw, Light Yellow, Yellow    Appearance  Urine Slightly Cloudy (A) Clear    Glucose Urine Negative Negative mg/dL    Bilirubin Urine Negative Negative    Ketones Urine Negative Negative mg/dL    Specific Gravity Urine 1.011 1.003 - 1.035    Blood Urine Large (A) Negative    pH Urine 7.0 5.0 - 7.0    Protein Albumin Urine 600 (A) Negative mg/dL    Urobilinogen Urine Normal Normal, 2.0 mg/dL    Nitrite Urine Positive (A) Negative    Leukocyte Esterase Urine Moderate (A) Negative    WBC Clumps Urine Present (A) None Seen /HPF    RBC Urine >182 (H) <=2 /HPF    WBC Urine 75 (H) <=5 /HPF    Transitional Epithelials Urine <1 <=1 /HPF    Narrative    Urine Culture ordered based on laboratory criteria   CBC with platelets and differential     Status: Abnormal   Result Value Ref Range    WBC Count 13.2 (H) 4.0 - 11.0 10e3/uL    RBC Count 5.26 4.40 - 5.90 10e6/uL    Hemoglobin 15.7 13.3 - 17.7 g/dL    Hematocrit 45.4 40.0 - 53.0 %    MCV 86 78 - 100 fL    MCH 29.8 26.5 - 33.0 pg    MCHC 34.6 31.5 - 36.5 g/dL    RDW 12.8 10.0 - 15.0 %    Platelet Count 233 150 - 450 10e3/uL    % Neutrophils 77 %    % Lymphocytes 13 %    % Monocytes 7 %    % Eosinophils 2 %    % Basophils 0 %    % Immature Granulocytes 0 %    NRBCs per 100 WBC 0 <1 /100    Absolute Neutrophils 10.2 (H) 1.6 - 8.3 10e3/uL    Absolute Lymphocytes 1.7 0.8 - 5.3 10e3/uL    Absolute Monocytes 0.9 0.0 - 1.3 10e3/uL    Absolute Eosinophils 0.3 0.0 - 0.7 10e3/uL    Absolute Basophils 0.0 0.0 - 0.2 10e3/uL    Absolute Immature Granulocytes 0.1 <=0.4 10e3/uL    Absolute NRBCs 0.0 10e3/uL   Lactic acid whole blood with 1x repeat in 2 hr when >2     Status: Normal   Result Value Ref Range    Lactic Acid, Initial 1.2 0.7 - 2.0 mmol/L   CBC with platelets differential     Status: Abnormal    Narrative    The following orders were created for panel order CBC with platelets differential.  Procedure                               Abnormality         Status                     ---------                                -----------         ------                     CBC with platelets and d...[[id number]]  Abnormal            Final result                 Please view results for these tests on the individual orders.     Medications   ciprofloxacin (CIPRO) infusion 400 mg (has no administration in time range)   HYDROmorphone (PF) (DILAUDID) injection 0.5 mg (0.5 mg Intravenous $Given 1/13/25 0316)   ketorolac (TORADOL) injection 15 mg (15 mg Intravenous $Given 1/13/25 0316)     Labs Ordered and Resulted from Time of ED Arrival to Time of ED Departure   COMPREHENSIVE METABOLIC PANEL - Abnormal       Result Value    Sodium 139      Potassium 3.6      Carbon Dioxide (CO2) 19 (*)     Anion Gap 14      Urea Nitrogen 27.0 (*)     Creatinine 1.34 (*)     GFR Estimate 69      Calcium 9.4      Chloride 106      Glucose 122 (*)     Alkaline Phosphatase 54      AST 17      ALT 23      Protein Total 6.8      Albumin 3.8      Bilirubin Total 0.4     ROUTINE UA WITH MICROSCOPIC REFLEX TO CULTURE - Abnormal    Color Urine Straw      Appearance Urine Slightly Cloudy (*)     Glucose Urine Negative      Bilirubin Urine Negative      Ketones Urine Negative      Specific Gravity Urine 1.011      Blood Urine Large (*)     pH Urine 7.0      Protein Albumin Urine 600 (*)     Urobilinogen Urine Normal      Nitrite Urine Positive (*)     Leukocyte Esterase Urine Moderate (*)     WBC Clumps Urine Present (*)     RBC Urine >182 (*)     WBC Urine 75 (*)     Transitional Epithelials Urine <1     CBC WITH PLATELETS AND DIFFERENTIAL - Abnormal    WBC Count 13.2 (*)     RBC Count 5.26      Hemoglobin 15.7      Hematocrit 45.4      MCV 86      MCH 29.8      MCHC 34.6      RDW 12.8      Platelet Count 233      % Neutrophils 77      % Lymphocytes 13      % Monocytes 7      % Eosinophils 2      % Basophils 0      % Immature Granulocytes 0      NRBCs per 100 WBC 0      Absolute Neutrophils 10.2 (*)     Absolute Lymphocytes 1.7      Absolute Monocytes 0.9      Absolute  Eosinophils 0.3      Absolute Basophils 0.0      Absolute Immature Granulocytes 0.1      Absolute NRBCs 0.0     LIPASE - Normal    Lipase 59     LACTIC ACID WHOLE BLOOD WITH 1X REPEAT IN 2 HR WHEN >2 - Normal    Lactic Acid, Initial 1.2     URINE CULTURE   BLOOD CULTURE   BLOOD CULTURE     CT Abdomen Pelvis w/o Contrast   Final Result   IMPRESSION:    1.  Postsurgical changes of bladder exstrophy repair with Mitrofanoff procedure and right lower quadrant urostomy. Urinary bladder is diffusely thick-walled and there is some gas noted within the antidependent bladder. Findings may be seen with cystitis    and correlation with urinalysis is recommended. Stone within the dependent urinary bladder measuring 13 x 7 mm.   2.  Chronic severe bilateral hydroureteronephrosis without obstructing ureterolithiasis. There is however new left perinephric and periureteral fat stranding which is suspicious for ascending urinary tract infection.   3.  Small nonobstructing renal calyceal calculi.                Critical care was not performed.     Medical Decision Making  The patient's presentation was of moderate complexity (an acute illness with systemic symptoms).    The patient's evaluation involved:  ordering and/or review of 3+ test(s) in this encounter (see separate area of note for details)  discussion of management or test interpretation with another health professional (see separate area of note for details)    The patient's management necessitated high risk (a parenteral controlled substance) and high risk (a decision regarding hospitalization).    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 Sunday morning he noted foul odor of his urine and red sediment but not gross blood. He endorses nausea and left flank pain as well. On exam, has left CVA tenderness and tachypnea. Differential diagnosis includes UTI, cystitis, pyelonephritis, nephrolithiasis, and sepsis.     UA demonstrated pyuria and significant hematuria. CBC showed WBC count of 13.2 with neutrophilia. CMP showed Cr of 1.34 (baseline of 1.4) and normal electrolyte levels. Lipase was negative. With leukocytosis and tachypnea, he met SIRs criteria. Lactate acid was not elevated and blood cultures are pending.     CT Abdomen without contrast demonstrated no obstructing ureterolithiasis and new left perinephric and periureteral fat stranding, concerning for pyelonephritis. He also has a urinary bladder stone present since 2022. He had bladder stones most recently removed on 9/9/2022.     Workup most consistent with pyelonephritis. Per chart review, He has recurrent UTIs with antibiotic resistant organisms, including citrobacter freundii complex. Started IV ciprofloxacin based on prior sensitivity data and gave Dilaudid and Toradol for pain control. Will admit patient to medicine based on complex pyelonephritis in the setting of a Mitrofanoff and abnormal urinary tract anatomy.     I have reviewed the nursing notes. I have reviewed the findings, diagnosis, plan and need for follow up with the patient.    New Prescriptions    No medications on file       Final diagnoses:   Pyelonephritis, acute     --    ED Attending Physician Attestation    I Nic Renee DO, cared for this patient with the Medical Student. I performed, or re-performed, the physical exam and medical decision-making. I have verified the accuracy of all the medical student findings and documentation above, and have edited as necessary.    Patient with history of neurogenic bladder/Mitrofanoff, presents with left-sided flank pain.  Found  to have acute pyelonephritis.  Started on IV antibiotics based on prior sensitivites, admitted to the hospital.  Case discussed with urology.  No need for Shi catheter or other acute intervention.  Patient can continue with intermittent cathing through his Mitrofanoff while he is in the hospital.  Discussed with the hospitalist for admission.      Nic Renee DO  Emergency Medicine       Nic Renee DO  01/13/25 0325

## 2025-01-13 NOTE — ED TRIAGE NOTES
Ambulatory to ED with c/o left sided flank pain. Pt believes he has UTI because symptoms are similar to previous UTI. Pain started 2 days ago and gotten progressively worse. 11 PM on 1/12, started getting short of breath. States he has only been taking his sz meds.     BP (!) 136/93   Pulse 77   Temp 98  F (36.7  C) (Axillary)   Resp 25   Ht 1.829 m (6')   Wt 136.1 kg (300 lb)   SpO2 99%   BMI 40.69 kg/m         Triage Assessment (Adult)       Row Name 01/13/25 0103          Triage Assessment    Airway WDL all;WDL        Respiratory WDL    Respiratory WDL X;rhythm/pattern     Rhythm/Pattern, Respiratory shortness of breath        Skin Circulation/Temperature WDL    Skin Circulation/Temperature WDL WDL        Cardiac WDL    Cardiac WDL WDL        Peripheral/Neurovascular WDL    Peripheral Neurovascular WDL WDL

## 2025-01-14 VITALS
SYSTOLIC BLOOD PRESSURE: 122 MMHG | HEIGHT: 72 IN | BODY MASS INDEX: 40.63 KG/M2 | DIASTOLIC BLOOD PRESSURE: 76 MMHG | RESPIRATION RATE: 18 BRPM | TEMPERATURE: 97.3 F | HEART RATE: 71 BPM | WEIGHT: 300 LBS | OXYGEN SATURATION: 97 %

## 2025-01-14 LAB
ANION GAP SERPL CALCULATED.3IONS-SCNC: 10 MMOL/L (ref 7–15)
BACTERIA UR CULT: NORMAL
BUN SERPL-MCNC: 24.1 MG/DL (ref 6–20)
CALCIUM SERPL-MCNC: 9.3 MG/DL (ref 8.8–10.4)
CHLORIDE SERPL-SCNC: 111 MMOL/L (ref 98–107)
CREAT SERPL-MCNC: 1.37 MG/DL (ref 0.67–1.17)
EGFRCR SERPLBLD CKD-EPI 2021: 67 ML/MIN/1.73M2
GLUCOSE SERPL-MCNC: 101 MG/DL (ref 70–99)
HCO3 SERPL-SCNC: 20 MMOL/L (ref 22–29)
HOLD SPECIMEN: NORMAL
POTASSIUM SERPL-SCNC: 3.8 MMOL/L (ref 3.4–5.3)
SODIUM SERPL-SCNC: 141 MMOL/L (ref 135–145)

## 2025-01-14 PROCEDURE — 99239 HOSP IP/OBS DSCHRG MGMT >30: CPT | Performed by: PHYSICIAN ASSISTANT

## 2025-01-14 PROCEDURE — 80048 BASIC METABOLIC PNL TOTAL CA: CPT | Performed by: PHYSICIAN ASSISTANT

## 2025-01-14 PROCEDURE — 82310 ASSAY OF CALCIUM: CPT | Performed by: PHYSICIAN ASSISTANT

## 2025-01-14 PROCEDURE — 250N000013 HC RX MED GY IP 250 OP 250 PS 637: Performed by: NURSE PRACTITIONER

## 2025-01-14 PROCEDURE — 250N000011 HC RX IP 250 OP 636: Performed by: NURSE PRACTITIONER

## 2025-01-14 PROCEDURE — 36415 COLL VENOUS BLD VENIPUNCTURE: CPT | Performed by: PHYSICIAN ASSISTANT

## 2025-01-14 PROCEDURE — 82565 ASSAY OF CREATININE: CPT | Performed by: PHYSICIAN ASSISTANT

## 2025-01-14 RX ORDER — CIPROFLOXACIN 500 MG/1
500 TABLET, FILM COATED ORAL 2 TIMES DAILY
Qty: 10 TABLET | Refills: 0 | Status: SHIPPED | OUTPATIENT
Start: 2025-01-14 | End: 2025-01-14

## 2025-01-14 RX ORDER — CIPROFLOXACIN 500 MG/1
500 TABLET, FILM COATED ORAL 2 TIMES DAILY
Qty: 16 TABLET | Refills: 0 | Status: SHIPPED | OUTPATIENT
Start: 2025-01-14 | End: 2025-01-22

## 2025-01-14 RX ADMIN — CIPROFLOXACIN 400 MG: 400 INJECTION, SOLUTION INTRAVENOUS at 04:25

## 2025-01-14 RX ADMIN — OXCARBAZEPINE 600 MG: 600 TABLET, FILM COATED ORAL at 08:50

## 2025-01-14 RX ADMIN — LEVETIRACETAM 3000 MG: 750 TABLET, FILM COATED, EXTENDED RELEASE ORAL at 08:50

## 2025-01-14 ASSESSMENT — ACTIVITIES OF DAILY LIVING (ADL)
ADLS_ACUITY_SCORE: 54

## 2025-01-14 NOTE — DISCHARGE SUMMARY
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    Weight as of this encounter: 136.1 kg (300 lb).       Follow-ups Needed After Discharge   Follow-up Appointments       Adult [facility] Follow-up and recommended labs and tests      Please follow up with your primary care provider within two weeks of discharge to ensure your symptoms continue to improve.    Appointments on [place] and/or [facility] (with [facility] or [facility] provider or service). Call [phone] if you haven't heard regarding these appointments within 7 days of discharge.                Unresulted Labs Ordered in the Past 30 Days of this Admission       Date and Time Order Name Status Description    1/13/2025  2:13 AM Blood Culture Peripheral Blood Preliminary     1/13/2025  2:13 AM Blood Culture Peripheral Blood Preliminary             Discharge Disposition   Discharged to home  Condition at discharge: Stable    Hospital Course   Olu Trinh is a 39 year old male with a past medical history of neurogenic bladder secondary to bladder/cloacal exstrophy, mitrofanoff, urinary calculi, chronic hydronephrosis, recurrent UTIs and epilepsy who presented to [facility] on 1/13/24 with a foul odor to his urine, red sediment, nausea and flank pain. UA revealed a moderate amount of leukocyte esterase, 75 WBC and he was started on IV Cipro for a urinary tract infection. Labs revealed a Cr of 1.37 (around his  baseline) with a mild leukocytosis of 11.5.  CT abdomen/pelvis revealed a diffusely thickened bladder, severe bilateral hydroureteronpehosis without any obstructing stones. Urology was consulted and per recommendations, he was irrigating his Mitrofanof more frequently and he will remain on antibiotics for a full 10 days for treatment of pyelonephritis. He will follow up with Internal Medicine and Urology as an outpatient.       Consultations This Hospital Stay   UROLOGY IP CONSULT    Code Status   Prior    Time Spent on this Encounter   IJannie PA, personally saw the patient today and spent greater than 30 minutes discharging this patient.       JADIEL Fernandes  [facility], located within [facility] EMERGENCY DEPARTMENT  [address] [address]  Phone: [phone]  ______________________________________________________________________    Physical Exam   Vital Signs: Temp: 97.3  F (36.3  C) Temp src: Oral BP: 122/76 Pulse: 71   Resp: 18 SpO2: 97 % O2 Device: None (Room air)    Weight: 300 lbs 0 oz  General Appearance: 39year old tall gentleman sitting up in bed, no acute distress, asking when he can go home  Respiratory: breathing comfortably on room air, no adventitious sounds to bilateral auscultation  Cardiovascular: regular rate and rhythm, no appreciable murmurs, rubs or gallops  GI: bowel sounds active, Mitrofanof site with beefy red appearance to skin,  working properly with active drainage or surrounding edema       Primary Care Physician   Physician No Ref-Primary    Discharge Orders      Reason for your hospital stay    You were hospitalized with a urinary tract infection.     Activity    Your activity upon discharge: activity as tolerated     Adult [facility] Follow-up and recommended labs and tests    Please follow up with your primary care provider within two weeks of discharge to ensure your symptoms continue to improve.    Appointments on [place] and/or [facility] (with [facility] or  [facility] provider or service). Call [phone] if you haven't heard regarding these appointments within 7 days of discharge.     Diet    Follow this diet upon discharge: Current Diet:Orders Placed This Encounter      Combination Diet Regular Diet Adult       Significant Results and Procedures   Most Recent 3 CBC's:  Recent Labs   Lab Test 01/13/25  0616 01/13/25  0135 08/30/24  0840   WBC 11.5* 13.2* 15.2*   HGB 14.6 15.7 14.9   MCV 86 86 87    233 171     Most Recent 3 BMP's:  Recent Labs   Lab Test 01/14/25  0710 01/13/25  0616 01/13/25 0135    138 139   POTASSIUM 3.8 3.7 3.6   CHLORIDE 111* 109* 106   CO2 20* 17* 19*   BUN 24.1* 27.1* 27.0*   CR 1.37* 1.34* 1.34*   ANIONGAP 10 12 14   MIO 9.3 9.2 9.4   * 114* 122*     Most Recent 2 LFT's:  Recent Labs   Lab Test 01/13/25  0616 01/13/25 0135   AST 14 17   ALT 18 23   ALKPHOS 45 54   BILITOTAL 0.4 0.4     Most Recent 3 INR's:No lab results found.    Discharge Medications   Discharge Medication List as of 1/14/2025  9:41 AM        CONTINUE these medications which have CHANGED    Details   ciprofloxacin (CIPRO) 500 MG tablet Take 1 tablet (500 mg) by mouth 2 times daily for 8 days., Disp-16 tablet, R-0, E-Prescribe           CONTINUE these medications which have NOT CHANGED    Details   Cranberry 500 MG CAPS Take 650 mg by mouth daily., Historical      levETIRAcetam (KEPPRA XR) 500 MG 24 hr tablet Take 3,000 mg by mouth daily., Historical      OXcarbazepine (TRILEPTAL) 600 MG tablet Take 1 tablet by mouth 2 times daily., Historical           Allergies   Allergies   Allergen Reactions    Ondansetron      Hallucinations    Sulfa Antibiotics      unknown

## 2025-01-14 NOTE — PROGRESS NOTES
Pt discharged, see note below.     Discharge to: home  Transportation: public transport (pt states is preferred, [place]   Time: approx 1000  Prescriptions: x1 prescription sent to pt's home pharmacy  Belongings: pt packed by self and denied help, did not see any belongings left in room after the fact.    -if applicable return home meds from inpatient pharmacy: n/a  Access: x2 PIV removed   Care plan and education discontinued: yes  Paperwork: Pt almost left unit without AVS and without education, noticed pt leaving and asked pt to stay for education and AVS. Pt agreed. AVS sent w pt upon discharge, education w pt completed about meds, upcoming appointments, and taking care of self at home. Pt asked applicable questions and verbalized understanding before leaving.

## 2025-01-14 NOTE — PROGRESS NOTES
Pt continues to self straight-cath Mitrofanoff every 2-4 hrs as per home routine. Staff ensured pt has enough supplies. Pt was informed that urology recommended irrigating Mitro once a day and  he stated that he does this at home.  Irrigation solution and syringe provided. MD paged to place a pt care order. Seizures pads brought in the room and staff explained the benefits of placing the pads on . Pt refused stating he didn't need the pads as he is able  to feel  when he is about to have a seizure.

## 2025-01-14 NOTE — PLAN OF CARE
"Goal Outcome Evaluation:      Plan of Care Reviewed With: patient    Overall Patient Progress: improvingOverall Patient Progress: improving       Pt pain improved today, declining pain meds.  Pt remains on IV antibiotic.  Pt wanting to go home.  Pt refusing any hospital food.  RN offered for patient to door dash food and pt aware that is an option.  Pt said he never eats \"hospital food.\"  Pt anticipating discharge home tomorrow.      "

## 2025-01-14 NOTE — PLAN OF CARE
Goal Outcome Evaluation:/76 (BP Location: Left arm, Patient Position: Semi-Nam's)   Pulse 71   Temp 97.3  F (36.3  C) (Oral)   Resp 18   Ht 1.829 m (6')   Wt 136.1 kg (300 lb)   SpO2 97%   BMI 40.69 kg/m      Shift:8110-7103  Reason for admission: Pyelonephritis   Vitals: VSS.   Activity: Independent with ADLs.   Pain: Denies pain.  Neuro:  Alert and orientedx4  Cardiac: Denies chest pain.   Respiratory:Sating >95% RA. Denies shortness of breath.   GI/: Self-cath via Mitrofanoff x2 this shift. Pt emptied the urine straight in the toilet bowl. Reminded to use urinal for measurement. Last BM 1/12.   Diet: Reg diet.   Lines: 2 PIV;SL.

## 2025-01-16 LAB
BACTERIA BLD CULT: NORMAL
BACTERIA BLD CULT: NORMAL

## 2025-01-18 LAB
BACTERIA BLD CULT: NO GROWTH
BACTERIA BLD CULT: NO GROWTH

## 2025-01-22 ENCOUNTER — ALLIED HEALTH/NURSE VISIT (OUTPATIENT)
Dept: UROLOGY | Facility: CLINIC | Age: 40
End: 2025-01-22
Payer: COMMERCIAL

## 2025-01-22 ENCOUNTER — ANCILLARY PROCEDURE (OUTPATIENT)
Dept: RADIOLOGY | Facility: AMBULATORY SURGERY CENTER | Age: 40
End: 2025-01-22
Attending: NURSE PRACTITIONER
Payer: COMMERCIAL

## 2025-01-22 VITALS
DIASTOLIC BLOOD PRESSURE: 95 MMHG | HEIGHT: 72 IN | BODY MASS INDEX: 40.63 KG/M2 | SYSTOLIC BLOOD PRESSURE: 139 MMHG | OXYGEN SATURATION: 95 % | HEART RATE: 76 BPM | WEIGHT: 300 LBS

## 2025-01-22 DIAGNOSIS — N31.9 NEUROGENIC BLADDER: Primary | ICD-10-CM

## 2025-01-22 DIAGNOSIS — N31.9 NEUROGENIC BLADDER: ICD-10-CM

## 2025-01-22 PROCEDURE — 51726 COMPLEX CYSTOMETROGRAM: CPT | Performed by: NURSE PRACTITIONER

## 2025-01-22 PROCEDURE — 51784 ANAL/URINARY MUSCLE STUDY: CPT | Performed by: NURSE PRACTITIONER

## 2025-01-22 PROCEDURE — 74430 CONTRAST X-RAY BLADDER: CPT | Performed by: NURSE PRACTITIONER

## 2025-01-22 ASSESSMENT — PAIN SCALES - GENERAL: PAINLEVEL_OUTOF10: NO PAIN (0)

## 2025-01-22 NOTE — NURSING NOTE
No chief complaint on file.      Blood pressure (!) 139/95, pulse 76, height 1.829 m (6'), weight 136.1 kg (300 lb), SpO2 95%. Body mass index is 40.69 kg/m .    Patient Active Problem List   Diagnosis    Attention deficit hyperactivity disorder (ADHD)    Neurogenic bladder    Other hydronephrosis    Bladder stone    Exstrophy of bladder sequence    Intractable epilepsy without status epilepticus, unspecified epilepsy type (H)    Pyelonephritis    E coli bacteremia    Pyelonephritis, acute       Allergies   Allergen Reactions    Ondansetron      Hallucinations    Sulfa Antibiotics      unknown       Current Outpatient Medications   Medication Sig Dispense Refill    ciprofloxacin (CIPRO) 500 MG tablet Take 1 tablet (500 mg) by mouth 2 times daily for 8 days. 16 tablet 0    levETIRAcetam (KEPPRA XR) 500 MG 24 hr tablet Take 3,000 mg by mouth daily.      OXcarbazepine (TRILEPTAL) 600 MG tablet Take 1 tablet by mouth 2 times daily.      Cranberry 500 MG CAPS Take 650 mg by mouth daily. (Patient not taking: Reported on 1/22/2025)         Social History     Tobacco Use    Smoking status: Never    Smokeless tobacco: Never   Vaping Use    Vaping status: Never Used   Substance Use Topics    Alcohol use: Yes     Comment: Alcoholic beverage once or twice per week.    Drug use: No       Manjit Rain, EMT-P  1/22/2025  9:28 AM

## 2025-01-22 NOTE — PATIENT INSTRUCTIONS
UROLOGY CLINIC VISIT PATIENT INSTRUCTIONS    -Follow up with Dr. Wayne for the cystoscopy test.     If you have any issues, questions or concerns in the meantime, do not hesitate to contact us at [phone] or via [employer].     Kymberly Moore, CNP  Department of Urology

## 2025-01-22 NOTE — NURSING NOTE
.  No chief complaint on file.      Blood pressure (!) 139/95, pulse 76, height 1.829 m (6'), weight 136.1 kg (300 lb), SpO2 95%. Body mass index is 40.69 kg/m .    Patient Active Problem List   Diagnosis    Attention deficit hyperactivity disorder (ADHD)    Neurogenic bladder    Other hydronephrosis    Bladder stone    Exstrophy of bladder sequence    Intractable epilepsy without status epilepticus, unspecified epilepsy type (H)    Pyelonephritis    E coli bacteremia    Pyelonephritis, acute       Allergies   Allergen Reactions    Ondansetron      Hallucinations    Sulfa Antibiotics      unknown       Current Outpatient Medications   Medication Sig Dispense Refill    ciprofloxacin (CIPRO) 500 MG tablet Take 1 tablet (500 mg) by mouth 2 times daily for 8 days. 16 tablet 0    levETIRAcetam (KEPPRA XR) 500 MG 24 hr tablet Take 3,000 mg by mouth daily.      OXcarbazepine (TRILEPTAL) 600 MG tablet Take 1 tablet by mouth 2 times daily.      Cranberry 500 MG CAPS Take 650 mg by mouth daily. (Patient not taking: Reported on 2025)         Social History     Tobacco Use    Smoking status: Never    Smokeless tobacco: Never   Vaping Use    Vaping status: Never Used   Substance Use Topics    Alcohol use: Yes     Comment: Alcoholic beverage once or twice per week.    Drug use: No       Invasive Procedure Safety Checklist:    Procedure: Urodynamics    Action: Complete sections and checkboxes as appropriate.  Pre-procedure:  1. Patient ID Verified with 2 identifiers (Yaa and  or MRN) : YES    2. Procedure and site verified with patient/designee (when able) : YES    3. Accurate consent documentation in medical record : YES    4. H&P (or appropriate assessment) documented in medical record : N/A  H&P must be up to 30 days prior to procedure an updated within 24 hours of Procedure as applicable.     5. Relevant diagnostic and radiology test results appropriately labeled and displayed as applicable : YES    6. Blood products,  implants, devices, and/or special equipment available for the procedure as applicable : YES    7. Procedure site(s) marked with provider initials [Exclusions: none] : NO    8. Marking not required. Reason : Yes  Procedure does not require site marking    Time Out:     Time-Out performed immediately prior to starting procedure, including verbal and active participation of all team members addressing: YES    1. Correct patient identity.  2. Confirmed that the correct side and site are marked.  3. An accurate procedure to be done.  4. Agreement on the procedure to be done.  5. Correct patient position.  6. Relevant images and results are properly labeled and appropriately displayed.  7. The need to administer antibiotics or fluids for irrigation purposes during the procedure as applicable.  8. Safety precautions based on patient history or medication use.    During Procedure: Verification of correct person, site, and procedure occurs any time the responsibility for care of the patient is transferred to another member of the care team.    The following medication was given:     MEDICATION:  Omnipaque (Iohexol Injection) (240mgI/mL)  ROUTE: Provider Administered  SITE: Provider Administered via catheter  DOSE: 100mL  LOT #: [id number]  : Allani  EXPIRATION DATE: 10 APR 2027  NDC#: 5800-1568-17     Was there drug waste? No  MEDICATION:  Omnipaque (Iohexol Injection) (240mgI/mL)  ROUTE: Provider Administered  SITE: Provider Administered via catheter  DOSE: 100mL  LOT #: [id number]  : Allani  EXPIRATION DATE: 10 APR 2027  NDC#: 9280-8192-65   Was there drug waste? No    Prior to injection, verified patient identity using patient's name and date of birth.  Due to injection administration, patient instructed to remain in clinic for 15 minutes  afterwards, and to report any adverse reaction to me immediately.    Drug Amount Wasted:  None.  Vial/Syringe: Single dose vial      The following  medication was given: See Above    Patient did tolerate procedure well.    Patient instructed as to where to call or go for pain, fever, leakage, or decreased urine flow.     This service provided today was under the direct supervision of Kymberly Moore CNP, who was available if needed.    Julia Luis  1/22/2025  8:54 AM

## 2025-01-22 NOTE — PROGRESS NOTES
PREPROCEDURE DIAGNOSES:    1. Neurogenic Bladder  2. Bilateral hydronephrosis    POSTPROCEDURE DIAGNOSES:  -Large bladder capacity (953 mL) with mostly normal filling sensations.  -Good bladder compliance without DO/DOI.  -No voiding phase completed today, as patient self-catheterizes via Mitrofanoff as needed.   -Fluoroscopy reveals a mildly-trabeculated bladder wall with obvious diverticulae or cellules.  Suspected right-sided vesicoureteral reflux observed in Series #3, early in the filling process, but not appreciated in later images. Unable to ascertain if there is left-sided reflux. The bladder neck appears partly open during filling. Did not observe voiding phase, as patient self-catheterizes via Mitrofanoff as needed.    PROCEDURE:    -Sterile urethral catheterization for measurement of postvoid residual urine volume.  -Complex filling cystometrogram with measurement of bladder and rectal pressures.  -Complex voiding cystometrogram with measurement of bladder and rectal pressures.  -Electromyography of the pelvic floor during urodynamics.  -Fluoroscopic imaging of the bladder during urodynamics, at least 3 views.    -Interpretation of urodynamics and flouroscopic imaging.      INDICATIONS FOR PROCEDURE:  Mr. Olu Trinh is a pleasant 39 year old male who presents for video urodynamic assessment. VUDS is requested today by Caroline Macias to better characterize Mr. Olu Trinh's voiding dysfunction.     DESCRIPTION OF PROCEDURE:  Risks, benefits, and alternatives to urodynamics were discussed with the patient and he wished to proceed.  Urodynamics are planned to better assess the primary etiology for Mr. Conleys urologic dysfunction. After informed consent was obtained, the patient was taken to the procedure room where the study was initiated. Findings below.     Next a 7F double-lumen urodynamics catheter was inserted into the bladder under sterile technique via Mitrofanoff.  A 7F  abdominal manometry catheter was placed in the rectum.  EMG pads were placed on both sides of the anal verge.  The bladder was filled with 100 mL of Omnipaque at 30 mL/minute and serial pressures were recorded.  With coughing there was an appropriate rise in vesical and abdominal pressures with no change in detrusor pressure, confirming good study catheter placement.    Due to patient leakage and loss of initial contrast volume, a second bottle of Omnipaque was spiked and administered.     DURING THE FILLING PHASE:  First sensation: 169 mL.  First Desire: 462 mL.  Strong Desire: 539 mL.  Maximum Capacity: 953 mL.    Uninhibited detrusor contractions:   Compliance: Good. PDet=12 cmH20 at capacity. Compliance ratio of 79.  Continence: Significant leakage per Mitrofanoff prior to catheter re-adjustment at volume of 177. Patient denied any sensation of leakage per urethra during duration of test, however, it was noted that there was a small amount of leakage on Chucks pad below the UDS chair.   EMG: Quiet during filling.    DURING THE VOIDING PHASE:  No voiding phase completed today, as patient self-catheterizes via Mitrofanoff as needed.     FLUOROSCOPIC IMAGING OF THE BLADDER DURING URODYNAMICS:  Please note, image numbers on UDS tracings correlate with iSite series numbers on PACS images. Fluoroscopy during today's procedure demonstrated a mildly-trabeculated bladder wall with obvious diverticulae or cellules.  Suspected right-sided vesicoureteral reflux observed in Series #3, early in the filling process, but not appreciated in later images. Unable to ascertain if there is left-sided reflux. The bladder neck appears partly open during filling. Did not observe voiding phase, as patient self-catheterizes via Mitrofanoff as needed.  At the completion of the study, all catheters were removed and the patient was brought back into the consultation room to further discuss today's study results.      ASSESSMENT/PLAN:    Olu Trinh is a pleasant 39 year old male who demonstrated the following findings today on urodynamic evaluation:    -Large bladder capacity (953 mL) with mostly normal filling sensations.  -Good bladder compliance without DO/DOI.  -No voiding phase completed today, as patient self-catheterizes via Mitrofanoff as needed.   -Fluoroscopy reveals a mildly-trabeculated bladder wall with obvious diverticulae or cellules.  Suspected right-sided vesicoureteral reflux observed in Series #3, early in the filling process, but not appreciated in later images. Unable to ascertain if there is left-sided reflux. The bladder neck appears partly open during filling. Did not observe voiding phase, as patient self-catheterizes via Mitrofanoff as needed.    The patient will follow up as scheduled with Dr. Wayne to further discuss today's study results and make plans for how best to proceed.      No antibiotic was provided for UTI prophylaxis following completion of today's study, as patient is currently finishing a course of ciprofloxacin.    Thank you for allowing me to participate in the care of . Olu Trinh and please don't hesitate to contact me with any questions or concerns.      Holly Carr PA-C  Department of Urology     ________________________________________________________________________________    I, Kymberly Moore, saw and evaluated this patient and was present for the entire procedure. I agree with the study results as stated above.    Kymberly Moore, CNP  Department of Urology

## 2025-03-03 ENCOUNTER — MEDICAL CORRESPONDENCE (OUTPATIENT)
Dept: HEALTH INFORMATION MANAGEMENT | Facility: CLINIC | Age: 40
End: 2025-03-03
Payer: COMMERCIAL

## 2025-03-13 ENCOUNTER — PRE VISIT (OUTPATIENT)
Dept: UROLOGY | Facility: CLINIC | Age: 40
End: 2025-03-13
Payer: COMMERCIAL

## 2025-03-13 NOTE — TELEPHONE ENCOUNTER
Reason for visit: Cystoscopy         Relevant information: 6 month cysto, urinary problem, NGB    Records/imaging/labs/orders: all records available    Pt called: no need for a call    At Rooming: ask symptoms    Lito Dorantes  3/13/2025  11:50 AM

## 2025-03-24 ENCOUNTER — OFFICE VISIT (OUTPATIENT)
Dept: UROLOGY | Facility: CLINIC | Age: 40
End: 2025-03-24
Payer: COMMERCIAL

## 2025-03-24 VITALS
BODY MASS INDEX: 40.63 KG/M2 | OXYGEN SATURATION: 95 % | DIASTOLIC BLOOD PRESSURE: 117 MMHG | SYSTOLIC BLOOD PRESSURE: 161 MMHG | HEART RATE: 70 BPM | WEIGHT: 300 LBS | HEIGHT: 72 IN

## 2025-03-24 DIAGNOSIS — N31.9 NEUROGENIC BLADDER: Primary | ICD-10-CM

## 2025-03-24 ASSESSMENT — PAIN SCALES - GENERAL: PAINLEVEL_OUTOF10: MILD PAIN (1)

## 2025-03-24 NOTE — PROGRESS NOTES
UROLOGY OUTPATIENT CLINIC NOTE    Name: Olu Trinh    MRN: [id number]   YOB: 1985  Date of Encounter: 03/24/25              History of Present Illness:   Mr. Olu Trinh is a 39 year old male seen for follow-up and cystoscopy. 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 Has had bladder stones removed in past multiple times in the past (2015 percutaneous w/ Dr. Blunt, 2018 Dr. Ibrahim, 2021 w/ Dr. Blunt, 2022 w/ Dr. Blunt).     He catheterizes per his Mitrofanoff stoma into an augmented bladder using a 14F straight catheter q3-4h. He has chronic bilateral hydronephrosis.     3/25/24: cysto per channel w/ Dr. Mckeon - small amount of mucus, no tumors/stones     11/25/24: presents for follow-up. Thinks he has had 1-2 UTIs since last seen but otherwise doing well. He catheterizes via an APV and irrigations - tries to remember but doesn't always happen daily.     FLEXIBLE CYSTOSCOPY (November 25, 2024)  After informed consent was obtained, the patient was brought to the procedure room and placed in the supine position. The abdomen was prepped and draped in a sterile fashion. A 16F flexible cystoscope was introduced through a well-lubricated APV stoma. Findings and interventions were as noted below.     Channel: patent, somewhat tortuous  Bladder: large capacity. No stones. Mild amount of mucus, which was irrigated out    1/13/25: presentation for pyelonephritis. CT showed worsened hydronephrosis and a 1.3 cm bladder stone.    1/22/25: UDS  -Large bladder capacity (953 mL) with mostly normal filling sensations.  -Good bladder compliance without DO/DOI.  -No voiding phase completed today, as patient self-catheterizes via Mitrofanoff as needed.   -Fluoroscopy reveals a mildly-trabeculated bladder wall with obvious diverticulae or cellules.  Suspected right-sided vesicoureteral reflux observed in  Series #3, early in the filling process, but not appreciated in later images. Unable to ascertain if there is left-sided reflux. The bladder neck appears partly open during filling. Did not observe voiding phase, as patient self-catheterizes via Mitrofanoff as needed.    3/24/25: here for follow-up    FLEXIBLE CYSTOSCOPY (March 24, 2025)  After informed consent was obtained, the patient was brought to the procedure room and placed in the supine position.  The abdomen and stoma were prepped and draped in a sterile fashion. A 16F flexible cystoscope was introduced through a well lubricated urethra. Findings and interventions were as noted below.    Channel: tortuous, patent. Easily accommodated a flexible cystoscope. No false passage or stricture  Bladder: capacious. Junction between augment and native bladder is clear. Mild mucus/debris. 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    Learning disability     Neurogenic bladder, NOS 10/30/2012    Obesity (BMI 35.0-39.9 without comorbidity)     Other hydronephrosis 06/02/2015    Unspecified epilepsy with intractable epilepsy 10/22/2013          Past Surgical History:     Past Surgical History:   Procedure Laterality Date    BLADDER AUGMENTATION      bladder neck reconstruction      Mikey Glez    CYSTOSCOPY N/A 07/10/2018    Procedure: CYSTOSCOPY;  CYSTOSCOPY  OF NEOBLADDER  ;  Surgeon: Mario Ibrahim MD;  Location:  OR    CYSTOSCOPY VIA MITROFANOFF N/A 1/29/2021    Procedure: CYSTOSCOPY, VIA MITROFANOFF;  Surgeon: Joel Blunt MD;  [place] [facility] [place]    LASER HOLMIUM LITHOTRIPSY BLADDER N/A 07/24/2015    Procedure: LASER HOLMIUM LITHOTRIPSY BLADDER;  Surgeon:  Joel Blunt MD;  Location: [facility] OR    LASER HOLMIUM LITHOTRIPSY BLADDER N/A 07/10/2018    Procedure: LASER HOLMIUM LITHOTRIPSY BLADDER;;  Surgeon: Mario Ibrahim MD;  Location: [facility] OR    LEFT TEMPORAL CRANIOTOMY FOR ANTERIOR TEMPORAL LOBECTOMY  10/01/2018    MITROFANOFF PROCEDURE (APPENDIX CONDUIT)  01/01/2002    PERCUTANEOUS CYSTOLITHOTOMY N/A 9/9/2022    Procedure: CYSTOLITHOTOMY, WITH LASER STANDBY;  Surgeon: Manjit Nicolas MD;  Location: [facility] [place]    [facility] REMOVAL OF KIDNEY STONE            Social History:     Social History     Tobacco Use    Smoking status: Never    Smokeless tobacco: Never   Substance Use Topics    Alcohol use: Yes     Comment: Alcoholic beverage once or twice per week.          Family History:     Family History   Problem Relation Age of Onset    Seizure Disorder Mother     Breast Cancer Mother     Cancer Father           Allergies:     Allergies   Allergen Reactions    Ondansetron      Hallucinations    Sulfa Antibiotics      unknown          Medications:     Current Outpatient Medications   Medication Sig Dispense Refill    Cranberry 500 MG CAPS Take 650 mg by mouth daily. (Patient not taking: Reported on 1/22/2025)      levETIRAcetam (KEPPRA XR) 500 MG 24 hr tablet Take 3,000 mg by mouth daily.      OXcarbazepine (TRILEPTAL) 600 MG tablet Take 1 tablet by mouth 2 times daily.       No current facility-administered medications for this visit.          Review of Systems:    ROS: 14-point review of systems was negative aside from that noted in the HPI. Additional pertinent positives are listed below.          Physical Exam:   BP (!) 161/117 (BP Location: Right arm, Patient Position: Sitting, Cuff Size: Adult Large)   Pulse 70   Ht 1.829 m (6')   Wt 136.1 kg (300 lb)   SpO2 95%   BMI 40.69 kg/m    Estimated body mass index is 40.69 kg/m  as calculated from the following:    Height as of 1/22/25: 1.829 m (6').    Weight as of 1/22/25: 136.1 kg (300 lb).  General: Pleasant  male in no apparent distress.  Resp: No respiratory distress  CV: RRR  Abdomen: Soft, nontender, nondistended. RLQ stoma.       Labs:    All laboratory data reviewed with patient    Creatinine   Date Value Ref Range Status   01/14/2025 1.37 (H) 0.67 - 1.17 mg/dL Final   01/13/2025 1.34 (H) 0.67 - 1.17 mg/dL Final   01/13/2025 1.34 (H) 0.67 - 1.17 mg/dL Final   08/30/2024 1.68 (H) 0.67 - 1.17 mg/dL Final   04/18/2024 1.43 (H) 0.67 - 1.17 mg/dL Final   06/03/2021 0.92 0.66 - 1.25 mg/dL Final   12/02/2020 1.39 (H) 0.72 - 1.25 mg/dL Final   09/12/2020 0.93 0.72 - 1.25 mg/dL Final   05/11/2020 1.00 0.66 - 1.25 mg/dL Final   09/19/2018 1.03 0.66 - 1.25 mg/dL Final       Imaging:    All imaging reviewed with patient.    CTAP (7/2023):  1. Substantially increased chronic right perinephric fat stranding  concerning for acute on chronic pyelonephritis.  2. Chronic bilateral hydronephrosis and hydroureters without evidence  of acute obstruction.  3. Postsurgical changes of bladder exstrophy repair with Mitrofanoff  procedure. Small bladder stone is similar to prior studies,  indeterminate if recurrent or unchanged bladder stone.    CTAP (1/2025):  1.  Postsurgical changes of bladder exstrophy repair with Mitrofanoff procedure and right lower quadrant urostomy. Urinary bladder is diffusely thick-walled and there is some gas noted within the antidependent bladder. Findings may be seen with cystitis   and correlation with urinalysis is recommended. Stone within the dependent urinary bladder measuring 13 x 7 mm.  2.  Chronic severe bilateral hydroureteronephrosis without obstructing ureterolithiasis. There is however new left perinephric and periureteral fat stranding which is suspicious for ascending urinary tract infection.  3.  Small nonobstructing renal calyceal calculi.      Outside records:    I spent 20 minutes reviewing outside records.         Assessment and Plan:   40 year old male with h/o neurogenic bladder secondary  to bladder/cloacal exstrophy s/p bladder augmentation and appendiceal Mitrofanoff (childhood - unknown age/bowel type) with multiple episodes of bladder stones requiring endoscopic removal, presenting for follow-up cystoscopy.     Reviewed multiple prior CT scans - the calcification at the bladder neck has been present since at least 2019. Suspect this is related to Deflux or other bulking agent injection during childhood. The degree of calcification has been increasing over time, though by small amounts. This is not intravesical and therefore does not need to be addressed unless it causes symptoms (not the case currently).  Encouraged increasing irrigation to daily  Follow up in 6 months for repeat cystoscopy    Benson Wayne MD  Genitourinary Reconstructive Surgery Fellow    Additional Coding Information:    Problems:  4 -- two or more stable chronic illnesses    Data Reviewed  Review of external notes as documented above     Independent interpretation of a test performed by another physician/other qualified health care professional (not separately reported) - CTA (from 1/2025 and multiple prior for comparison)    Level of risk:  4 -- diagnosis or treatment severely limited by social determinants of health    Time spent:  20 minutes spent by me on the date of the encounter doing chart review, history and exam, documentation and further activities per the note. This was outside of the planned cystoscopy procedure.

## 2025-03-24 NOTE — NURSING NOTE
Chief Complaint   Patient presents with    Cystoscopy     Pt states here for a cystoscopy     Has some right sided flank pain about a 1    Blood pressure (!) 161/117, pulse 70, height 1.829 m (6'), weight 136.1 kg (300 lb), SpO2 95%. Body mass index is 40.69 kg/m .    Patient Active Problem List   Diagnosis    Attention deficit hyperactivity disorder (ADHD)    Neurogenic bladder    Other hydronephrosis    Bladder stone    Exstrophy of bladder sequence    Intractable epilepsy without status epilepticus, unspecified epilepsy type (H)    Pyelonephritis    E coli bacteremia    Pyelonephritis, acute       Allergies   Allergen Reactions    Ondansetron      Hallucinations    Sulfa Antibiotics      unknown       Current Outpatient Medications   Medication Sig Dispense Refill    Cranberry 500 MG CAPS Take 650 mg by mouth daily.      levETIRAcetam (KEPPRA XR) 500 MG 24 hr tablet Take 3,000 mg by mouth daily.      OXcarbazepine (TRILEPTAL) 600 MG tablet Take 1 tablet by mouth 2 times daily.         Social History     Tobacco Use    Smoking status: Never    Smokeless tobacco: Never   Vaping Use    Vaping status: Never Used   Substance Use Topics    Alcohol use: Yes     Comment: Alcoholic beverage once or twice per week.    Drug use: No       What to expect after the procedure reviewed with patient: yes    Lito Dorantes  3/24/2025  2:24 PM     Invasive Procedure Safety Checklist:    Procedure: cystoscopy    Action: Complete sections and checkboxes as appropriate.    Pre-procedure:  1. Patient ID Verified with 2 identifiers (Yaa and  or MRN) : YES    2. Procedure and site verified with patient/designee (when able) : YES    3. Accurate consent documentation in medical record : YES    4. H&P (or appropriate assessment) documented in medical record : YES  H&P must be up to 30 days prior to procedure an updated within 24 hours of                 Procedure as applicable.     5. Relevant diagnostic and radiology test results  appropriately labeled and displayed as applicable : YES    6. Blood products, implants, devices, and/or special equipment available for the procedure as applicable : YES    7. Procedure site(s) marked with provider initials [Exclusions: None] : NO    8. Marking not required. Reason : Yes  Procedure does not require site marking    Time Out:     Time-Out performed immediately prior to starting procedure, including verbal and active participation of all team members addressing: YES    1. Correct patient identity.  2. Confirmed that the correct side and site are marked.  3. An accurate procedure to be done.  4. Agreement on the procedure to be done.  5. Correct patient position.  6. Relevant images and results are properly labeled and appropriately displayed.  7. The need to administer antibiotics or fluids for irrigation purposes during the procedure as applicable.  8. Safety precautions based on patient history or medication use.    During Procedure: Verification of correct person, site, and procedure occurs any time the responsibility for care of the patient is transferred to another member of the care team.    No medications administered during this procedure.            Lito Dorantes  March 24, 2025

## 2025-03-24 NOTE — LETTER
3/24/2025       RE: Olu Trinh  [address]  [place] [address]     Dear Colleague,    Thank you for referring your patient, Olu Trinh, to the [facility] [place] at [facility]. Please see a copy of my visit note below.    UROLOGY OUTPATIENT CLINIC NOTE    Name: Olu Trinh    MRN: [id number]   YOB: 1985  Date of Encounter: 03/24/25              History of Present Illness:   Mr. Olu Trinh is a 39 year old male seen for follow-up and cystoscopy. 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 Has had bladder stones removed in past multiple times in the past (2015 percutaneous w/ Dr. Blunt, 2018 Dr. Ibrahim, 2021 w/ Dr. Blunt, 2022 w/ Dr. Blunt).     He catheterizes per his Mitrofanoff stoma into an augmented bladder using a 14F straight catheter q3-4h. He has chronic bilateral hydronephrosis.     3/25/24: cysto per channel w/ Dr. Mckeon - small amount of mucus, no tumors/stones     11/25/24: presents for follow-up. Thinks he has had 1-2 UTIs since last seen but otherwise doing well. He catheterizes via an APV and irrigations - tries to remember but doesn't always happen daily.     FLEXIBLE CYSTOSCOPY (November 25, 2024)  After informed consent was obtained, the patient was brought to the procedure room and placed in the supine position. The abdomen was prepped and draped in a sterile fashion. A 16F flexible cystoscope was introduced through a well-lubricated APV stoma. Findings and interventions were as noted below.     Channel: patent, somewhat tortuous  Bladder: large capacity. No stones. Mild amount of mucus, which was irrigated out    1/13/25: presentation for pyelonephritis. CT showed worsened hydronephrosis and a 1.3 cm bladder stone.    1/22/25: UDS  -Large bladder capacity (953  mL) with mostly normal filling sensations.  -Good bladder compliance without DO/DOI.  -No voiding phase completed today, as patient self-catheterizes via Mitrofanoff as needed.   -Fluoroscopy reveals a mildly-trabeculated bladder wall with obvious diverticulae or cellules.  Suspected right-sided vesicoureteral reflux observed in Series #3, early in the filling process, but not appreciated in later images. Unable to ascertain if there is left-sided reflux. The bladder neck appears partly open during filling. Did not observe voiding phase, as patient self-catheterizes via Mitrofanoff as needed.    3/24/25: here for follow-up    FLEXIBLE CYSTOSCOPY (March 24, 2025)  After informed consent was obtained, the patient was brought to the procedure room and placed in the supine position.  The abdomen and stoma were prepped and draped in a sterile fashion. A 16F flexible cystoscope was introduced through a well lubricated urethra. Findings and interventions were as noted below.    Channel: tortuous, patent. Easily accommodated a flexible cystoscope. No false passage or stricture  Bladder: capacious. Junction between augment and native bladder is clear. Mild mucus/debris. 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     Learning disability      Neurogenic bladder, NOS 10/30/2012     Obesity (BMI 35.0-39.9 without comorbidity)      Other hydronephrosis 06/02/2015     Unspecified epilepsy with intractable epilepsy 10/22/2013          Past Surgical History:     Past Surgical History:   Procedure Laterality Date     BLADDER AUGMENTATION       bladder neck reconstruction      Mikey Glez     CYSTOSCOPY N/A 07/10/2018    Procedure:  CYSTOSCOPY;  CYSTOSCOPY  OF NEOBLADDER  ;  Surgeon: Mario Ibrahim MD;  Location: [facility] OR     CYSTOSCOPY VIA MITROFANOFF N/A 1/29/2021    Procedure: CYSTOSCOPY, VIA MITROFANOFF;  Surgeon: Joel Blunt MD;  Location: [facility] OR     LASER HOLMIUM LITHOTRIPSY BLADDER N/A 07/24/2015    Procedure: LASER HOLMIUM LITHOTRIPSY BLADDER;  Surgeon: Joel Blunt MD;  Location: [facility] OR     LASER HOLMIUM LITHOTRIPSY BLADDER N/A 07/10/2018    Procedure: LASER HOLMIUM LITHOTRIPSY BLADDER;;  Surgeon: Mario Ibrahim MD;  Location: [facility] OR     LEFT TEMPORAL CRANIOTOMY FOR ANTERIOR TEMPORAL LOBECTOMY  10/01/2018     MITROFANOFF PROCEDURE (APPENDIX CONDUIT)  01/01/2002     PERCUTANEOUS CYSTOLITHOTOMY N/A 9/9/2022    Procedure: CYSTOLITHOTOMY, WITH LASER STANDBY;  Surgeon: Manjit Nicolas MD;  [place] [facility] [place]     [facility] REMOVAL OF KIDNEY STONE            Social History:     Social History     Tobacco Use     Smoking status: Never     Smokeless tobacco: Never   Substance Use Topics     Alcohol use: Yes     Comment: Alcoholic beverage once or twice per week.          Family History:     Family History   Problem Relation Age of Onset     Seizure Disorder Mother      Breast Cancer Mother      Cancer Father           Allergies:     Allergies   Allergen Reactions     Ondansetron      Hallucinations     Sulfa Antibiotics      unknown          Medications:     Current Outpatient Medications   Medication Sig Dispense Refill     Cranberry 500 MG CAPS Take 650 mg by mouth daily. (Patient not taking: Reported on 1/22/2025)       levETIRAcetam (KEPPRA XR) 500 MG 24 hr tablet Take 3,000 mg by mouth daily.       OXcarbazepine (TRILEPTAL) 600 MG tablet Take 1 tablet by mouth 2 times daily.       No current facility-administered medications for this visit.          Review of Systems:    ROS: 14-point review of systems was negative aside from that noted in the HPI. Additional pertinent positives are listed below.          Physical  Exam:   BP (!) 161/117 (BP Location: Right arm, Patient Position: Sitting, Cuff Size: Adult Large)   Pulse 70   Ht 1.829 m (6')   Wt 136.1 kg (300 lb)   SpO2 95%   BMI 40.69 kg/m    Estimated body mass index is 40.69 kg/m  as calculated from the following:    Height as of 1/22/25: 1.829 m (6').    Weight as of 1/22/25: 136.1 kg (300 lb).  General: Pleasant male in no apparent distress.  Resp: No respiratory distress  CV: RRR  Abdomen: Soft, nontender, nondistended. RLQ stoma.       Labs:    All laboratory data reviewed with patient    Creatinine   Date Value Ref Range Status   01/14/2025 1.37 (H) 0.67 - 1.17 mg/dL Final   01/13/2025 1.34 (H) 0.67 - 1.17 mg/dL Final   01/13/2025 1.34 (H) 0.67 - 1.17 mg/dL Final   08/30/2024 1.68 (H) 0.67 - 1.17 mg/dL Final   04/18/2024 1.43 (H) 0.67 - 1.17 mg/dL Final   06/03/2021 0.92 0.66 - 1.25 mg/dL Final   12/02/2020 1.39 (H) 0.72 - 1.25 mg/dL Final   09/12/2020 0.93 0.72 - 1.25 mg/dL Final   05/11/2020 1.00 0.66 - 1.25 mg/dL Final   09/19/2018 1.03 0.66 - 1.25 mg/dL Final       Imaging:    All imaging reviewed with patient.    CTAP (7/2023):  1. Substantially increased chronic right perinephric fat stranding  concerning for acute on chronic pyelonephritis.  2. Chronic bilateral hydronephrosis and hydroureters without evidence  of acute obstruction.  3. Postsurgical changes of bladder exstrophy repair with Mitrofanoff  procedure. Small bladder stone is similar to prior studies,  indeterminate if recurrent or unchanged bladder stone.    CTAP (1/2025):  1.  Postsurgical changes of bladder exstrophy repair with Mitrofanoff procedure and right lower quadrant urostomy. Urinary bladder is diffusely thick-walled and there is some gas noted within the antidependent bladder. Findings may be seen with cystitis   and correlation with urinalysis is recommended. Stone within the dependent urinary bladder measuring 13 x 7 mm.  2.  Chronic severe bilateral hydroureteronephrosis without  obstructing ureterolithiasis. There is however new left perinephric and periureteral fat stranding which is suspicious for ascending urinary tract infection.  3.  Small nonobstructing renal calyceal calculi.      Outside records:    I spent 20 minutes reviewing outside records.         Assessment and Plan:   40 year old male with h/o neurogenic bladder secondary to bladder/cloacal exstrophy s/p bladder augmentation and appendiceal Mitrofanoff (childhood - unknown age/bowel type) with multiple episodes of bladder stones requiring endoscopic removal, presenting for follow-up cystoscopy.     Reviewed multiple prior CT scans - the calcification at the bladder neck has been present since at least 2019. Suspect this is related to Deflux or other bulking agent injection during childhood. The degree of calcification has been increasing over time, though by small amounts. This is not intravesical and therefore does not need to be addressed unless it causes symptoms (not the case currently).  Encouraged increasing irrigation to daily  Follow up in 6 months for repeat cystoscopy    Benson Wayne MD  Genitourinary Reconstructive Surgery Fellow    Additional Coding Information:    Problems:  4 -- two or more stable chronic illnesses    Data Reviewed  Review of external notes as documented above     Independent interpretation of a test performed by another physician/other qualified health care professional (not separately reported) - CTAP (from 1/2025 and multiple prior for comparison)    Level of risk:  4 -- diagnosis or treatment severely limited by social determinants of health    Time spent:  20 minutes spent by me on the date of the encounter doing chart review, history and exam, documentation and further activities per the note. This was outside of the planned cystoscopy procedure.      Again, thank you for allowing me to participate in the care of your patient.      Sincerely,    Benson Wayne MD

## 2025-05-19 ENCOUNTER — LAB (OUTPATIENT)
Dept: LAB | Facility: CLINIC | Age: 40
End: 2025-05-19
Attending: STUDENT IN AN ORGANIZED HEALTH CARE EDUCATION/TRAINING PROGRAM
Payer: COMMERCIAL

## 2025-05-19 DIAGNOSIS — R39.89 SUSPECTED UTI: Primary | ICD-10-CM

## 2025-05-19 DIAGNOSIS — R39.89 SUSPECTED UTI: ICD-10-CM

## 2025-05-19 LAB
ALBUMIN UR-MCNC: 100 MG/DL
APPEARANCE UR: CLEAR
BACTERIA #/AREA URNS HPF: ABNORMAL /HPF
BILIRUB UR QL STRIP: NEGATIVE
COLOR UR AUTO: ABNORMAL
GLUCOSE UR STRIP-MCNC: NEGATIVE MG/DL
HGB UR QL STRIP: ABNORMAL
KETONES UR STRIP-MCNC: NEGATIVE MG/DL
LEUKOCYTE ESTERASE UR QL STRIP: ABNORMAL
MUCOUS THREADS #/AREA URNS LPF: PRESENT /LPF
NITRATE UR QL: NEGATIVE
PH UR STRIP: 7 [PH] (ref 5–7)
RBC URINE: 6 /HPF
SP GR UR STRIP: 1.01 (ref 1–1.03)
SQUAMOUS EPITHELIAL: <1 /HPF
UROBILINOGEN UR STRIP-MCNC: NORMAL MG/DL
WBC CLUMPS #/AREA URNS HPF: PRESENT /HPF
WBC URINE: 42 /HPF

## 2025-05-19 PROCEDURE — 87186 SC STD MICRODIL/AGAR DIL: CPT | Performed by: STUDENT IN AN ORGANIZED HEALTH CARE EDUCATION/TRAINING PROGRAM

## 2025-05-19 PROCEDURE — 99000 SPECIMEN HANDLING OFFICE-LAB: CPT | Performed by: PATHOLOGY

## 2025-05-19 PROCEDURE — 81001 URINALYSIS AUTO W/SCOPE: CPT | Performed by: PATHOLOGY

## 2025-05-20 ENCOUNTER — APPOINTMENT (OUTPATIENT)
Dept: CT IMAGING | Facility: CLINIC | Age: 40
End: 2025-05-20
Attending: EMERGENCY MEDICINE
Payer: COMMERCIAL

## 2025-05-20 ENCOUNTER — HOSPITAL ENCOUNTER (EMERGENCY)
Facility: CLINIC | Age: 40
Discharge: HOME | End: 2025-05-20
Attending: EMERGENCY MEDICINE | Admitting: EMERGENCY MEDICINE
Payer: COMMERCIAL

## 2025-05-20 VITALS
SYSTOLIC BLOOD PRESSURE: 145 MMHG | BODY MASS INDEX: 40.63 KG/M2 | WEIGHT: 300 LBS | RESPIRATION RATE: 22 BRPM | TEMPERATURE: 100.1 F | HEART RATE: 80 BPM | DIASTOLIC BLOOD PRESSURE: 93 MMHG | HEIGHT: 72 IN | OXYGEN SATURATION: 93 %

## 2025-05-20 DIAGNOSIS — T83.511A URINARY TRACT INFECTION ASSOCIATED WITH INDWELLING URETHRAL CATHETER, INITIAL ENCOUNTER: ICD-10-CM

## 2025-05-20 DIAGNOSIS — N39.0 URINARY TRACT INFECTION ASSOCIATED WITH INDWELLING URETHRAL CATHETER, INITIAL ENCOUNTER: ICD-10-CM

## 2025-05-20 LAB
ALBUMIN SERPL BCG-MCNC: 3.6 G/DL (ref 3.5–5.2)
ALBUMIN UR-MCNC: 200 MG/DL
ALP SERPL-CCNC: 44 U/L (ref 40–150)
ALT SERPL W P-5'-P-CCNC: 23 U/L (ref 0–70)
ANION GAP SERPL CALCULATED.3IONS-SCNC: 14 MMOL/L (ref 7–15)
APPEARANCE UR: CLEAR
AST SERPL W P-5'-P-CCNC: 26 U/L (ref 0–45)
ATRIAL RATE - MUSE: 82 BPM
BASOPHILS # BLD AUTO: 0 10E3/UL (ref 0–0.2)
BASOPHILS NFR BLD AUTO: 0 %
BILIRUB SERPL-MCNC: 0.8 MG/DL
BILIRUB UR QL STRIP: NEGATIVE
BUN SERPL-MCNC: 25.3 MG/DL (ref 6–20)
CALCIUM SERPL-MCNC: 9.3 MG/DL (ref 8.8–10.4)
CHLORIDE SERPL-SCNC: 99 MMOL/L (ref 98–107)
COLOR UR AUTO: ABNORMAL
CREAT SERPL-MCNC: 1.45 MG/DL (ref 0.67–1.17)
DIASTOLIC BLOOD PRESSURE - MUSE: NORMAL MMHG
EGFRCR SERPLBLD CKD-EPI 2021: 62 ML/MIN/1.73M2
EOSINOPHIL # BLD AUTO: 0 10E3/UL (ref 0–0.7)
EOSINOPHIL NFR BLD AUTO: 0 %
ERYTHROCYTE [DISTWIDTH] IN BLOOD BY AUTOMATED COUNT: 13.1 % (ref 10–15)
FLUAV RNA SPEC QL NAA+PROBE: NEGATIVE
FLUBV RNA RESP QL NAA+PROBE: NEGATIVE
GLUCOSE SERPL-MCNC: 116 MG/DL (ref 70–99)
GLUCOSE UR STRIP-MCNC: NEGATIVE MG/DL
HCO3 SERPL-SCNC: 18 MMOL/L (ref 22–29)
HCT VFR BLD AUTO: 43.3 % (ref 40–53)
HGB BLD-MCNC: 15.1 G/DL (ref 13.3–17.7)
HGB UR QL STRIP: ABNORMAL
IMM GRANULOCYTES # BLD: 0 10E3/UL
IMM GRANULOCYTES NFR BLD: 0 %
INTERPRETATION ECG - MUSE: NORMAL
KETONES UR STRIP-MCNC: NEGATIVE MG/DL
LACTATE SERPL-SCNC: 0.9 MMOL/L (ref 0.7–2)
LEUKOCYTE ESTERASE UR QL STRIP: ABNORMAL
LEVETIRACETAM SERPL-MCNC: 21.3 ÂΜG/ML (ref 10–40)
LYMPHOCYTES # BLD AUTO: 0.5 10E3/UL (ref 0.8–5.3)
LYMPHOCYTES NFR BLD AUTO: 5 %
MAGNESIUM SERPL-MCNC: 1.4 MG/DL (ref 1.7–2.3)
MCH RBC QN AUTO: 29.7 PG (ref 26.5–33)
MCHC RBC AUTO-ENTMCNC: 34.9 G/DL (ref 31.5–36.5)
MCV RBC AUTO: 85 FL (ref 78–100)
MONOCYTES # BLD AUTO: 0.4 10E3/UL (ref 0–1.3)
MONOCYTES NFR BLD AUTO: 5 %
MUCOUS THREADS #/AREA URNS LPF: PRESENT /LPF
NEUTROPHILS # BLD AUTO: 8 10E3/UL (ref 1.6–8.3)
NEUTROPHILS NFR BLD AUTO: 90 %
NITRATE UR QL: NEGATIVE
NRBC # BLD AUTO: 0 10E3/UL
NRBC BLD AUTO-RTO: 0 /100
P AXIS - MUSE: 66 DEGREES
PH UR STRIP: 7 [PH] (ref 5–7)
PHOSPHATE SERPL-MCNC: 2.1 MG/DL (ref 2.5–4.5)
PLATELET # BLD AUTO: 160 10E3/UL (ref 150–450)
POTASSIUM SERPL-SCNC: 3.9 MMOL/L (ref 3.4–5.3)
PR INTERVAL - MUSE: 164 MS
PROT SERPL-MCNC: 6.7 G/DL (ref 6.4–8.3)
QRS DURATION - MUSE: 96 MS
QT - MUSE: 352 MS
QTC - MUSE: 411 MS
R AXIS - MUSE: 43 DEGREES
RBC # BLD AUTO: 5.09 10E6/UL (ref 4.4–5.9)
RBC URINE: 3 /HPF
RSV RNA SPEC NAA+PROBE: NEGATIVE
SARS-COV-2 RNA RESP QL NAA+PROBE: NEGATIVE
SODIUM SERPL-SCNC: 131 MMOL/L (ref 135–145)
SP GR UR STRIP: 1.01 (ref 1–1.03)
SQUAMOUS EPITHELIAL: <1 /HPF
SYSTOLIC BLOOD PRESSURE - MUSE: NORMAL MMHG
T AXIS - MUSE: 76 DEGREES
UROBILINOGEN UR STRIP-MCNC: NORMAL MG/DL
VENTRICULAR RATE- MUSE: 82 BPM
WBC # BLD AUTO: 9 10E3/UL (ref 4–11)
WBC URINE: 17 /HPF

## 2025-05-20 PROCEDURE — 99285 EMERGENCY DEPT VISIT HI MDM: CPT | Mod: 25 | Performed by: EMERGENCY MEDICINE

## 2025-05-20 PROCEDURE — 93005 ELECTROCARDIOGRAM TRACING: CPT | Performed by: EMERGENCY MEDICINE

## 2025-05-20 PROCEDURE — 87637 SARSCOV2&INF A&B&RSV AMP PRB: CPT | Performed by: EMERGENCY MEDICINE

## 2025-05-20 PROCEDURE — 74177 CT ABD & PELVIS W/CONTRAST: CPT

## 2025-05-20 PROCEDURE — 250N000011 HC RX IP 250 OP 636: Performed by: EMERGENCY MEDICINE

## 2025-05-20 PROCEDURE — 80177 DRUG SCRN QUAN LEVETIRACETAM: CPT | Performed by: EMERGENCY MEDICINE

## 2025-05-20 PROCEDURE — 81001 URINALYSIS AUTO W/SCOPE: CPT | Performed by: EMERGENCY MEDICINE

## 2025-05-20 PROCEDURE — 84100 ASSAY OF PHOSPHORUS: CPT | Performed by: EMERGENCY MEDICINE

## 2025-05-20 PROCEDURE — 36415 COLL VENOUS BLD VENIPUNCTURE: CPT | Performed by: EMERGENCY MEDICINE

## 2025-05-20 PROCEDURE — 96366 THER/PROPH/DIAG IV INF ADDON: CPT | Performed by: EMERGENCY MEDICINE

## 2025-05-20 PROCEDURE — 99284 EMERGENCY DEPT VISIT MOD MDM: CPT | Performed by: EMERGENCY MEDICINE

## 2025-05-20 PROCEDURE — 96365 THER/PROPH/DIAG IV INF INIT: CPT | Performed by: EMERGENCY MEDICINE

## 2025-05-20 PROCEDURE — 85004 AUTOMATED DIFF WBC COUNT: CPT | Performed by: EMERGENCY MEDICINE

## 2025-05-20 PROCEDURE — 258N000003 HC RX IP 258 OP 636: Performed by: EMERGENCY MEDICINE

## 2025-05-20 PROCEDURE — 74177 CT ABD & PELVIS W/CONTRAST: CPT | Mod: 26 | Performed by: RADIOLOGY

## 2025-05-20 PROCEDURE — 83735 ASSAY OF MAGNESIUM: CPT | Performed by: EMERGENCY MEDICINE

## 2025-05-20 PROCEDURE — 93010 ELECTROCARDIOGRAM REPORT: CPT | Performed by: EMERGENCY MEDICINE

## 2025-05-20 PROCEDURE — 250N000013 HC RX MED GY IP 250 OP 250 PS 637: Performed by: EMERGENCY MEDICINE

## 2025-05-20 PROCEDURE — 87040 BLOOD CULTURE FOR BACTERIA: CPT | Performed by: EMERGENCY MEDICINE

## 2025-05-20 PROCEDURE — 80053 COMPREHEN METABOLIC PANEL: CPT | Performed by: EMERGENCY MEDICINE

## 2025-05-20 PROCEDURE — 83605 ASSAY OF LACTIC ACID: CPT | Performed by: EMERGENCY MEDICINE

## 2025-05-20 RX ORDER — IOPAMIDOL 755 MG/ML
135 INJECTION, SOLUTION INTRAVASCULAR ONCE
Status: COMPLETED | OUTPATIENT
Start: 2025-05-20 | End: 2025-05-20

## 2025-05-20 RX ORDER — MAGNESIUM SULFATE HEPTAHYDRATE 40 MG/ML
2 INJECTION, SOLUTION INTRAVENOUS ONCE
Status: COMPLETED | OUTPATIENT
Start: 2025-05-20 | End: 2025-05-20

## 2025-05-20 RX ORDER — CIPROFLOXACIN 500 MG/1
500 TABLET, FILM COATED ORAL 2 TIMES DAILY
Qty: 20 TABLET | Refills: 0 | Status: SHIPPED | OUTPATIENT
Start: 2025-05-20 | End: 2025-05-30

## 2025-05-20 RX ORDER — CIPROFLOXACIN 2 MG/ML
400 INJECTION, SOLUTION INTRAVENOUS ONCE
Status: COMPLETED | OUTPATIENT
Start: 2025-05-20 | End: 2025-05-20

## 2025-05-20 RX ORDER — ACETAMINOPHEN 500 MG
1000 TABLET ORAL ONCE
Status: COMPLETED | OUTPATIENT
Start: 2025-05-20 | End: 2025-05-20

## 2025-05-20 RX ADMIN — MAGNESIUM SULFATE HEPTAHYDRATE 2 G: 2 INJECTION, SOLUTION INTRAVENOUS at 04:19

## 2025-05-20 RX ADMIN — ACETAMINOPHEN 1000 MG: 500 TABLET ORAL at 04:42

## 2025-05-20 RX ADMIN — POTASSIUM & SODIUM PHOSPHATES POWDER PACK 280-160-250 MG 1 PACKET: 280-160-250 PACK at 04:25

## 2025-05-20 RX ADMIN — IOPAMIDOL 135 ML: 755 INJECTION, SOLUTION INTRAVENOUS at 05:59

## 2025-05-20 RX ADMIN — SODIUM CHLORIDE 1000 ML: 0.9 INJECTION, SOLUTION INTRAVENOUS at 04:17

## 2025-05-20 RX ADMIN — CIPROFLOXACIN 400 MG: 400 INJECTION, SOLUTION INTRAVENOUS at 04:35

## 2025-05-20 ASSESSMENT — ACTIVITIES OF DAILY LIVING (ADL)
ADLS_ACUITY_SCORE: 54

## 2025-05-20 ASSESSMENT — COLUMBIA-SUICIDE SEVERITY RATING SCALE - C-SSRS
1. IN THE PAST MONTH, HAVE YOU WISHED YOU WERE DEAD OR WISHED YOU COULD GO TO SLEEP AND NOT WAKE UP?: NO
6. HAVE YOU EVER DONE ANYTHING, STARTED TO DO ANYTHING, OR PREPARED TO DO ANYTHING TO END YOUR LIFE?: NO
2. HAVE YOU ACTUALLY HAD ANY THOUGHTS OF KILLING YOURSELF IN THE PAST MONTH?: NO

## 2025-05-20 NOTE — ED PROVIDER NOTES
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  He states he has been having right-sided flank pain and shortness of breath for the past 2 to 3 days.  He states that he always gets short of breath when he has a urinary tract infection.  He reports that he is not concerned about any problems with his chest and adamantly denies any chest pain.  He also reports a \"seizure aura\" which she describes as flank pain and shortness of breath which often gets in the setting of acute UTI.  Patient was most recently admitted for pyelonephritis in January 2025.  He was treated with IV ciprofloxacin based on prior cultures.    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    Learning disability     Neurogenic bladder, NOS 10/30/2012    Obesity (BMI 35.0-39.9 without comorbidity)     Other hydronephrosis 06/02/2015    Unspecified epilepsy with intractable epilepsy 10/22/2013     Past Surgical History:   Procedure Laterality Date    BLADDER AUGMENTATION      bladder neck reconstruction      Mikey Mayra Cobosnupur    CYSTOSCOPY N/A 07/10/2018    Procedure: CYSTOSCOPY;  CYSTOSCOPY  OF NEOBLADDER  ;  Surgeon: Mario Ibrahim MD;  Location:  OR    CYSTOSCOPY VIA MITROFANOFF N/A 1/29/2021    Procedure: CYSTOSCOPY, VIA MITROFANOFF;  Surgeon: Joel Blunt MD;  Location: " [facility] OR    LASER HOLMIUM LITHOTRIPSY BLADDER N/A 07/24/2015    Procedure: LASER HOLMIUM LITHOTRIPSY BLADDER;  Surgeon: Joel Blunt MD;  Location: [facility] OR    LASER HOLMIUM LITHOTRIPSY BLADDER N/A 07/10/2018    Procedure: LASER HOLMIUM LITHOTRIPSY BLADDER;;  Surgeon: Mario Ibrahim MD;  Location: [facility] OR    LEFT TEMPORAL CRANIOTOMY FOR ANTERIOR TEMPORAL LOBECTOMY  10/01/2018    MITROFANOFF PROCEDURE (APPENDIX CONDUIT)  01/01/2002    PERCUTANEOUS CYSTOLITHOTOMY N/A 9/9/2022    Procedure: CYSTOLITHOTOMY, WITH LASER STANDBY;  Surgeon: Manjit Nicolas MD;  Location: [facility] OR    [facility] REMOVAL OF KIDNEY STONE       ciprofloxacin (CIPRO) 500 MG tablet  Cranberry 500 MG CAPS  levETIRAcetam (KEPPRA XR) 500 MG 24 hr tablet  OXcarbazepine (TRILEPTAL) 600 MG tablet      Allergies   Allergen Reactions    Ondansetron      Hallucinations    Sulfa Antibiotics      unknown     Family History  Family History   Problem Relation Age of Onset    Seizure Disorder Mother     Breast Cancer Mother     Cancer Father      Social History   Social History     Tobacco Use    Smoking status: Never    Smokeless tobacco: Never   Vaping Use    Vaping status: Never Used   Substance Use Topics    Alcohol use: Yes     Comment: Alcoholic beverage once or twice per week.    Drug use: No      A medically appropriate review of systems was performed with pertinent positives and negatives noted in the HPI, and all other systems negative.    Physical Exam   BP: 129/84  Pulse: 107  Temp: 98.7  F (37.1  C)  Resp: 22  Height: 182.9 cm (6')  Weight: 136.1 kg (300 lb)  SpO2: 96 %  Physical Exam  Vitals and nursing note reviewed.   Constitutional:       General: He is in acute distress.      Appearance: Normal appearance. He is ill-appearing.   HENT:      Head: Normocephalic.      Nose: Nose normal.   Eyes:      Pupils: Pupils are equal, round, and reactive to light.   Cardiovascular:      Rate and Rhythm: Regular rhythm. Tachycardia present.    Pulmonary:      Effort: Pulmonary effort is normal.   Abdominal:      General: There is no distension.      Palpations: Abdomen is soft.      Tenderness: There is right CVA tenderness. There is no left CVA tenderness.   Musculoskeletal:         General: No deformity. Normal range of motion.      Cervical back: Normal range of motion.   Skin:     General: Skin is warm.   Neurological:      General: No focal deficit present.      Mental Status: He is alert and oriented to person, place, and time.      Gait: Gait normal.   Psychiatric:         Mood and Affect: Mood normal.         ED Course, Procedures, & Data     ED Course as of 05/20/25 0701   Tue May 20, 2025   0358      EKG Interpretation:     Interpreted by Nic Renee DO  Time reviewed:0357   Symptoms at time of EKG: tahycardia, dyspnea   Rhythm: normal sinus   Rate: normal  Axis: NORMAL  Ectopy: none  Conduction: normal  ST Segments/ T Waves: No ST-T wave changes  Q Waves: none  Comparison to prior: No old EKG available    Clinical Impression: normal EKG           Results for orders placed or performed during the hospital encounter of 05/20/25   CT Abdomen Pelvis w Contrast     Status: None    Narrative    EXAM: CT ABDOMEN PELVIS W CONTRAST  LOCATION: [facility]  DATE: 5/20/2025    INDICATION: Lower abd pain, R flank pain, urinary symptoms, hx Mitrofanoff  COMPARISON: None.  TECHNIQUE: CT scan of the abdomen and pelvis was performed following injection of IV contrast. Multiplanar reformats were obtained. Dose reduction techniques were used.  CONTRAST: 135ml isovue 370    FINDINGS:   LOWER CHEST: Dependent atelectasis in the posterior lung bases.    HEPATOBILIARY: No significant mass or bile duct dilatation. No calcified gallstones.     PANCREAS: No significant mass, duct dilatation, or inflammatory change.    SPLEEN: Normal size.    ADRENAL GLANDS: Normal.    KIDNEYS/BLADDER: Again noted chronic severe  bilateral hydroureteronephrosis without obstructing ureterolithiasis. Mild  delayed nephrogram on the right compared to the left . Postsurgical changes of bladder exstrophy repair with Mitrofanoff procedure and   right lower quadrant urostomy. Urinary bladder is diffusely thick-walled. Stone within the dependent urinary bladder measuring 13 x 7 mm. Small nonobstructing renal calyceal calculi.    BOWEL: Nonspecific nonobstructive bowel gas pattern. No inflammatory changes.     LYMPH NODES: No lymphadenopathy.    VASCULATURE: No abdominal aortic aneurysm.    PELVIC ORGANS: No pelvic masses.    MUSCULOSKELETAL: Mild spondylosis. No inguinal hernias. No ventral hernia.      Impression    IMPRESSION:   1.  Postsurgical changes of bladder exstrophy repair with Mitrofanoff procedure and right lower quadrant urostomy.  2.  Chronic severe bilateral hydroureteronephrosis without obstructing ureterolithiasis.  3.  Mild delayed nephrogram on the right compared to the left.  4.  Urinary bladder is diffusely thick-walled. Stone within the dependent urinary bladder measuring 13 x 7 mm.  5.  Small nonobstructing renal calyceal calculi.                    Influenza A/B, RSV and SARS-CoV2 PCR (COVID-19) Nasopharyngeal     Status: Normal    Specimen: Nasopharyngeal; Swab   Result Value Ref Range    Influenza A PCR Negative Negative    Influenza B PCR Negative Negative    RSV PCR Negative Negative    SARS CoV2 PCR Negative Negative    Narrative    Testing was performed using the Xpert Xpress CoV2/Flu/RSV Assay on the [employer] GeneXpert Instrument. This test should be ordered for the detection of SARS-CoV2, influenza, and RSV viruses in individuals with signs and symptoms of respiratory tract infection. This test is for in vitro diagnostic use under the [place] FDA for laboratories certified under CLIA to perform high or moderate complexity testing. This test has been [place] FDA cleared. A negative result does not rule out the presence of PCR  inhibitors in the specimen or target RNA in concentration below the limit of detection for the assay. If only one viral target is positive but coinfection with multiple targets is suspected, the sample should be re-tested with another FDA cleared, approved, or authorized test, if coninfection would change clinical management. This test was validated by the [facility]. These laboratories are certified under the Clinical Laboratory Improvement Amendments of 1988 (CLIA-88) as qualified to perfom high complexity laboratory testing.   UA with Microscopic reflex to Culture     Status: Abnormal    Specimen: Urine, Clean Catch   Result Value Ref Range    Color Urine Light Yellow Colorless, Straw, Light Yellow, Yellow    Appearance Urine Clear Clear    Glucose Urine Negative Negative mg/dL    Bilirubin Urine Negative Negative    Ketones Urine Negative Negative mg/dL    Specific Gravity Urine 1.009 1.003 - 1.035    Blood Urine Moderate (A) Negative    pH Urine 7.0 5.0 - 7.0    Protein Albumin Urine 200 (A) Negative mg/dL    Urobilinogen Urine Normal Normal mg/dL    Nitrite Urine Negative Negative    Leukocyte Esterase Urine Large (A) Negative    Mucus Urine Present (A) None Seen /LPF    RBC Urine 3 (H) <=2 /HPF    WBC Urine 17 (H) <=5 /HPF    Squamous Epithelials Urine <1 <=1 /HPF    Narrative    Urine Culture ordered based on laboratory criteria   [place] Draw     Status: None (In process)    Narrative    The following orders were created for panel order [place] Draw.  Procedure                               Abnormality         Status                     ---------                               -----------         ------                     Extra Blue Top Tube[[id number]]                             In process                 Extra Red Top Tube[[id number]]                              In process                 Extra Green Top (Lithiu...[[id number]]                      In process                 Extra  Purple Top Tube[[id number]]                           In process                 Extra Green Top (Lithiu...[[id number]]                      In process                   Please view results for these tests on the individual orders.   Comprehensive metabolic panel     Status: Abnormal   Result Value Ref Range    Sodium 131 (L) 135 - 145 mmol/L    Potassium 3.9 3.4 - 5.3 mmol/L    Carbon Dioxide (CO2) 18 (L) 22 - 29 mmol/L    Anion Gap 14 7 - 15 mmol/L    Urea Nitrogen 25.3 (H) 6.0 - 20.0 mg/dL    Creatinine 1.45 (H) 0.67 - 1.17 mg/dL    GFR Estimate 62 >60 mL/min/1.73m2    Calcium 9.3 8.8 - 10.4 mg/dL    Chloride 99 98 - 107 mmol/L    Glucose 116 (H) 70 - 99 mg/dL    Alkaline Phosphatase 44 40 - 150 U/L    AST 26 0 - 45 U/L    ALT 23 0 - 70 U/L    Protein Total 6.7 6.4 - 8.3 g/dL    Albumin 3.6 3.5 - 5.2 g/dL    Bilirubin Total 0.8 <=1.2 mg/dL   Lactic acid whole blood with 1x repeat in 2 hr when >2     Status: Normal   Result Value Ref Range    Lactic Acid, Initial 0.9 0.7 - 2.0 mmol/L   Magnesium     Status: Abnormal   Result Value Ref Range    Magnesium 1.4 (L) 1.7 - 2.3 mg/dL   Phosphorus     Status: Abnormal   Result Value Ref Range    Phosphorus 2.1 (L) 2.5 - 4.5 mg/dL   CBC with platelets and differential     Status: Abnormal   Result Value Ref Range    WBC Count 9.0 4.0 - 11.0 10e3/uL    RBC Count 5.09 4.40 - 5.90 10e6/uL    Hemoglobin 15.1 13.3 - 17.7 g/dL    Hematocrit 43.3 40.0 - 53.0 %    MCV 85 78 - 100 fL    MCH 29.7 26.5 - 33.0 pg    MCHC 34.9 31.5 - 36.5 g/dL    RDW 13.1 10.0 - 15.0 %    Platelet Count 160 150 - 450 10e3/uL    % Neutrophils 90 %    % Lymphocytes 5 %    % Monocytes 5 %    % Eosinophils 0 %    % Basophils 0 %    % Immature Granulocytes 0 %    NRBCs per 100 WBC 0 <1 /100    Absolute Neutrophils 8.0 1.6 - 8.3 10e3/uL    Absolute Lymphocytes 0.5 (L) 0.8 - 5.3 10e3/uL    Absolute Monocytes 0.4 0.0 - 1.3 10e3/uL    Absolute Eosinophils 0.0 0.0 - 0.7 10e3/uL    Absolute Basophils 0.0 0.0 -  0.2 10e3/uL    Absolute Immature Granulocytes 0.0 <=0.4 10e3/uL    Absolute NRBCs 0.0 10e3/uL   Keppra (Levetiracetam) Level     Status: Normal   Result Value Ref Range    Keppra (Levetiracetam) Level 21.3 10.0 - 40.0  g/mL   EKG 12-lead, tracing only     Status: None   Result Value Ref Range    Systolic Blood Pressure  mmHg    Diastolic Blood Pressure  mmHg    Ventricular Rate 82 BPM    Atrial Rate 82 BPM    AK Interval 164 ms    QRS Duration 96 ms     ms    QTc 411 ms    P Axis 66 degrees    R AXIS 43 degrees    T Axis 76 degrees    Interpretation ECG       Sinus rhythm  Normal ECG  Unconfirmed report - interpretation of this ECG is computer generated - see medical record for final interpretation    Confirmed by - EMERGENCY ROOM, PHYSICIAN ([id number]),  LINDA ASTUDILLO ([id number]) on 5/20/2025 6:43:28 AM     CBC with platelets differential     Status: Abnormal    Narrative    The following orders were created for panel order CBC with platelets differential.  Procedure                               Abnormality         Status                     ---------                               -----------         ------                     CBC with platelets and ...[[id number]]  Abnormal            Final result                 Please view results for these tests on the individual orders.     Medications   pharmacy alert - intermittent dosing (has no administration in time range)   ciprofloxacin (CIPRO) infusion 400 mg (0 mg Intravenous Stopped 5/20/25 0551)   sodium chloride 0.9% BOLUS 1,000 mL (0 mLs Intravenous Stopped 5/20/25 0551)   magnesium sulfate 2 g in 50 mL sterile water intermittent infusion (0 g Intravenous Stopped 5/20/25 0551)   potassium & sodium phosphates (NEUTRA-PHOS) Packet 1 packet (1 packet Oral $Given 5/20/25 9539)   acetaminophen (TYLENOL) tablet 1,000 mg (1,000 mg Oral $Given 5/20/25 0308)   iopamidol (ISOVUE-370) solution 135 mL (135 mLs Intravenous $Given 5/20/25 8659)   sodium chloride (PF) 0.9% PF  flush 84 mL (84 mLs Intravenous $Given 5/20/25 0600)     Labs Ordered and Resulted from Time of ED Arrival to Time of ED Departure   ROUTINE UA WITH MICROSCOPIC REFLEX TO CULTURE - Abnormal       Result Value    Color Urine Light Yellow      Appearance Urine Clear      Glucose Urine Negative      Bilirubin Urine Negative      Ketones Urine Negative      Specific Gravity Urine 1.009      Blood Urine Moderate (*)     pH Urine 7.0      Protein Albumin Urine 200 (*)     Urobilinogen Urine Normal      Nitrite Urine Negative      Leukocyte Esterase Urine Large (*)     Mucus Urine Present (*)     RBC Urine 3 (*)     WBC Urine 17 (*)     Squamous Epithelials Urine <1     COMPREHENSIVE METABOLIC PANEL - Abnormal    Sodium 131 (*)     Potassium 3.9      Carbon Dioxide (CO2) 18 (*)     Anion Gap 14      Urea Nitrogen 25.3 (*)     Creatinine 1.45 (*)     GFR Estimate 62      Calcium 9.3      Chloride 99      Glucose 116 (*)     Alkaline Phosphatase 44      AST 26      ALT 23      Protein Total 6.7      Albumin 3.6      Bilirubin Total 0.8     MAGNESIUM - Abnormal    Magnesium 1.4 (*)    PHOSPHORUS - Abnormal    Phosphorus 2.1 (*)    CBC WITH PLATELETS AND DIFFERENTIAL - Abnormal    WBC Count 9.0      RBC Count 5.09      Hemoglobin 15.1      Hematocrit 43.3      MCV 85      MCH 29.7      MCHC 34.9      RDW 13.1      Platelet Count 160      % Neutrophils 90      % Lymphocytes 5      % Monocytes 5      % Eosinophils 0      % Basophils 0      % Immature Granulocytes 0      NRBCs per 100 WBC 0      Absolute Neutrophils 8.0      Absolute Lymphocytes 0.5 (*)     Absolute Monocytes 0.4      Absolute Eosinophils 0.0      Absolute Basophils 0.0      Absolute Immature Granulocytes 0.0      Absolute NRBCs 0.0     INFLUENZA A/B, RSV AND SARS-COV2 PCR - Normal    Influenza A PCR Negative      Influenza B PCR Negative      RSV PCR Negative      SARS CoV2 PCR Negative     LACTIC ACID WHOLE BLOOD WITH 1X REPEAT IN 2 HR WHEN >2 - Normal     Lactic Acid, Initial 0.9     KEPPRA (LEVETIRACETAM) LEVEL - Normal    Keppra (Levetiracetam) Level 21.3     OXCARBAZEPINE LEVEL   BLOOD CULTURE   BLOOD CULTURE     CT Abdomen Pelvis w Contrast   Final Result   IMPRESSION:    1.  Postsurgical changes of bladder exstrophy repair with Mitrofanoff procedure and right lower quadrant urostomy.   2.  Chronic severe bilateral hydroureteronephrosis without obstructing ureterolithiasis.   3.  Mild delayed nephrogram on the right compared to the left.   4.  Urinary bladder is diffusely thick-walled. Stone within the dependent urinary bladder measuring 13 x 7 mm.   5.  Small nonobstructing renal calyceal calculi.                                     Critical care was not performed.     Medical Decision Making  The patient's presentation was of moderate complexity (a chronic illness mild to moderate exacerbation, progression, or side effect of treatment).    The patient's evaluation involved:  review of 3+ test result(s) ordered prior to this encounter (see separate area of note for details)  ordering and/or review of 3+ test(s) in this encounter (see separate area of note for details)    The patient's management necessitated moderate risk (prescription drug management including medications given in the ED).    Assessment & Plan    Patient with history of Mitrofanoff, presents to the ED for evaluation of UTI symptoms.  Reports some lower abdominal pain, and shortness of breath, which he states he always gets when he has a UTI.  Arrival to the ED, he is in no respiratory distress.  His lungs are clear to auscultation.  His EKG shows normal sinus rhythm without signs of acute ischemia.  He does not want any chest imaging because he feels confident that his symptoms are related to his UTI.  He does have a low-grade temp of 100.1.  Metabolic panel shows baseline kidney function  Mild hyponatremia, hypomagnesemia, and hypophosphatemia, were replaced in the ED.  CBC without  leukocytosis or anemia.  Lactic acid is normal.  Vital signs have remained stable.  No evidence of sepsis/septic shock.  CT scan shows severe bilateral hydronephrosis which is unchanged from previous.  Also noted to have a thickened urinary bladder wall and a dependent stone, both of which are unchanged from previous as well .  Urinalysistoday  is suggestive of infection.  Reviewed urine culture results from 1/13/2025, 9/12/2024, and 8/30/2024.  He did grow out Citrobacter on culture from 8/30/2024 that was susceptible to ciprofloxacin.  He was given IV ciprofloxacin in the ED.  He is appropriate for outpatient management given his clinical presentation and otherwise reassuring labs.  Was given a 10-day course of oral ciprofloxacin and instructions to follow-up with his PCP.  Symptoms to return to the ED with any new or worsening symptoms.  Findings and plan of care discussed with the patient.  Expressed verbal understanding and comfort with the plan.      I have reviewed the nursing notes. I have reviewed the findings, diagnosis, plan and need for follow up with the patient.    New Prescriptions    CIPROFLOXACIN (CIPRO) 500 MG TABLET    Take 1 tablet (500 mg) by mouth 2 times daily for 10 days.       Final diagnoses:   Urinary tract infection associated with indwelling urethral catheter, initial encounter       Nic Renee DO  [facility] EMERGENCY DEPARTMENT  5/20/2025     Nic Renee DO  05/20/25 0702

## 2025-05-20 NOTE — ED TRIAGE NOTES
Pt ambulatory to triage w c/o possible UTI. Pt reports urinary frequency and feeling like he can't empty his bladder. Pt reports pain on the penile tip. Pt has a Mitrofanoff. Pt is also reporting seizure auras but took a rescue pill. Pt also endorsing SOB that started last Thursday and has been on and off. Pt reports these symptoms happen when he has a UTI.     /84   Pulse 107   Temp 98.7  F (37.1  C) (Oral)   Resp 22   Ht 1.829 m (6')   Wt 136.1 kg (300 lb)   SpO2 96%   BMI 40.69 kg/m         Triage Assessment (Adult)       Row Name 05/20/25 0237          Triage Assessment    Airway WDL WDL        Respiratory WDL    Respiratory WDL X;rhythm/pattern     Rhythm/Pattern, Respiratory shortness of breath        Skin Circulation/Temperature WDL    Skin Circulation/Temperature WDL WDL        Cardiac WDL    Cardiac WDL WDL        Peripheral/Neurovascular WDL    Peripheral Neurovascular WDL WDL        Cognitive/Neuro/Behavioral WDL    Cognitive/Neuro/Behavioral WDL WDL

## 2025-05-20 NOTE — DISCHARGE INSTRUCTIONS
Your workup in the emergency department is consistent with a urinary tract infection.  If started you on antibiotics (ciprofloxacin).  Please take these as prescribed and follow-up with your primary care doctor.  Return to the Emergency Department immediately if you develop any worsening symptoms, fevers, or other symptoms that are concerning to you.

## 2025-05-22 ENCOUNTER — DOCUMENTATION ONLY (OUTPATIENT)
Dept: UROLOGY | Facility: CLINIC | Age: 40
End: 2025-05-22
Payer: COMMERCIAL

## 2025-05-22 LAB
BACTERIA SPEC CULT: NORMAL
BACTERIA SPEC CULT: NORMAL
BACTERIA UR CULT: ABNORMAL
BACTERIA UR CULT: ABNORMAL

## 2025-05-22 NOTE — PROGRESS NOTES
Patient was seen in ED on 5/20/25 for worsening UTI symptoms and is currently being treated with a 10 day course of Cipro.      Thank you,  Dipika Berger RN, BSN   Urology Triage Nurse

## 2025-05-25 LAB
BACTERIA SPEC CULT: NO GROWTH
BACTERIA SPEC CULT: NO GROWTH

## 2025-06-09 ENCOUNTER — TELEPHONE (OUTPATIENT)
Dept: UROLOGY | Facility: CLINIC | Age: 40
End: 2025-06-09
Payer: COMMERCIAL

## 2025-06-09 NOTE — TELEPHONE ENCOUNTER
Patient confirmed scheduled appointment:  Date: 8/11  Time: 10:45am  Visit type: Cysto  Provider:   Location: [facility]  Testing/imaging:   Additional notes:

## 2025-06-12 ENCOUNTER — DOCUMENTATION ONLY (OUTPATIENT)
Dept: UROLOGY | Facility: CLINIC | Age: 40
End: 2025-06-12
Payer: COMMERCIAL

## 2025-06-12 NOTE — PROGRESS NOTES
Type of Form Received: Order (self-caths)    Form Received (Date) 6/12/25   Form Filled out Yes, date 6/12/25   Placed in provider folder Yes

## 2025-06-17 ENCOUNTER — LAB (OUTPATIENT)
Dept: LAB | Facility: CLINIC | Age: 40
End: 2025-06-17
Payer: COMMERCIAL

## 2025-06-17 DIAGNOSIS — R39.89 SUSPECTED UTI: Primary | ICD-10-CM

## 2025-06-17 DIAGNOSIS — R39.89 SUSPECTED UTI: ICD-10-CM

## 2025-06-17 LAB
ALBUMIN UR-MCNC: 100 MG/DL
APPEARANCE UR: CLEAR
BILIRUB UR QL STRIP: NEGATIVE
COLOR UR AUTO: ABNORMAL
GLUCOSE UR STRIP-MCNC: NEGATIVE MG/DL
HGB UR QL STRIP: ABNORMAL
KETONES UR STRIP-MCNC: NEGATIVE MG/DL
LEUKOCYTE ESTERASE UR QL STRIP: ABNORMAL
NITRATE UR QL: NEGATIVE
PH UR STRIP: 6.5 [PH] (ref 5–7)
RBC URINE: 12 /HPF
SP GR UR STRIP: 1.01 (ref 1–1.03)
TRANSITIONAL EPI: <1 /HPF
UROBILINOGEN UR STRIP-MCNC: NORMAL MG/DL
WBC CLUMPS #/AREA URNS HPF: PRESENT /HPF
WBC URINE: 33 /HPF

## 2025-06-17 PROCEDURE — 99000 SPECIMEN HANDLING OFFICE-LAB: CPT | Performed by: PATHOLOGY

## 2025-06-17 PROCEDURE — 81001 URINALYSIS AUTO W/SCOPE: CPT | Performed by: PATHOLOGY

## 2025-06-17 PROCEDURE — 87186 SC STD MICRODIL/AGAR DIL: CPT | Performed by: STUDENT IN AN ORGANIZED HEALTH CARE EDUCATION/TRAINING PROGRAM

## 2025-06-18 DIAGNOSIS — N39.0 UTI (URINARY TRACT INFECTION): Primary | ICD-10-CM

## 2025-06-18 RX ORDER — CIPROFLOXACIN 500 MG/1
500 TABLET, FILM COATED ORAL 2 TIMES DAILY
Qty: 14 TABLET | Refills: 0 | Status: SHIPPED | OUTPATIENT
Start: 2025-06-18 | End: 2025-06-25

## 2025-06-18 NOTE — PROGRESS NOTES
"Call placed to patient to let him know that he does have positive infection markers in his urinalysis (positive leukocytes). Cipro order placed and sent to patient's pharmacy of choice. Asked about his breathing and he states that he is \"breathing through (my) mouth instead of nose, but not having bad SOB\". Reinforced going to an ED if his breathing worsens. Will monitor for UC result tomorrow and let him know if a change in antibiotics is needed.      Thank you,  Dipika Berger RN, BSN   Urology Triage Nurse    "

## 2025-06-19 LAB
BACTERIA UR CULT: ABNORMAL
BACTERIA UR CULT: ABNORMAL

## 2025-06-21 LAB
BACTERIA UR CULT: ABNORMAL
BACTERIA UR CULT: ABNORMAL

## 2025-06-23 ENCOUNTER — DOCUMENTATION ONLY (OUTPATIENT)
Dept: UROLOGY | Facility: CLINIC | Age: 40
End: 2025-06-23
Payer: COMMERCIAL

## 2025-06-23 ENCOUNTER — MEDICAL CORRESPONDENCE (OUTPATIENT)
Dept: HEALTH INFORMATION MANAGEMENT | Facility: CLINIC | Age: 40
End: 2025-06-23
Payer: COMMERCIAL

## 2025-06-23 NOTE — PROGRESS NOTES
Call placed to patient. He reports that he is feeling better and UTI symptoms are gone.      Thank you,  Dipika Berger RN, BSN   Urology Triage Nurse

## 2025-07-30 ENCOUNTER — LAB (OUTPATIENT)
Dept: LAB | Facility: CLINIC | Age: 40
End: 2025-07-30
Payer: COMMERCIAL

## 2025-07-30 DIAGNOSIS — R39.89 SUSPECTED UTI: ICD-10-CM

## 2025-07-30 DIAGNOSIS — R39.89 SUSPECTED UTI: Primary | ICD-10-CM

## 2025-07-30 LAB
ALBUMIN UR-MCNC: 100 MG/DL
APPEARANCE UR: ABNORMAL
BACTERIA #/AREA URNS HPF: ABNORMAL /HPF
BILIRUB UR QL STRIP: NEGATIVE
COLOR UR AUTO: ABNORMAL
GLUCOSE UR STRIP-MCNC: NEGATIVE MG/DL
HGB UR QL STRIP: ABNORMAL
KETONES UR STRIP-MCNC: NEGATIVE MG/DL
LEUKOCYTE ESTERASE UR QL STRIP: ABNORMAL
MUCOUS THREADS #/AREA URNS LPF: PRESENT /LPF
NITRATE UR QL: NEGATIVE
PH UR STRIP: 6.5 [PH] (ref 5–7)
RBC URINE: 32 /HPF
SP GR UR STRIP: 1.01 (ref 1–1.03)
UROBILINOGEN UR STRIP-MCNC: NORMAL MG/DL
WBC CLUMPS #/AREA URNS HPF: PRESENT /HPF
WBC URINE: 14 /HPF

## 2025-07-30 PROCEDURE — 99000 SPECIMEN HANDLING OFFICE-LAB: CPT | Performed by: PATHOLOGY

## 2025-07-30 PROCEDURE — 87186 SC STD MICRODIL/AGAR DIL: CPT | Performed by: UROLOGY

## 2025-07-30 PROCEDURE — 81001 URINALYSIS AUTO W/SCOPE: CPT | Performed by: PATHOLOGY

## 2025-07-31 LAB — BACTERIA UR CULT: NORMAL

## 2025-08-02 LAB — BACTERIA UR CULT: ABNORMAL

## 2025-08-06 ENCOUNTER — TELEPHONE (OUTPATIENT)
Dept: UROLOGY | Facility: CLINIC | Age: 40
End: 2025-08-06
Payer: COMMERCIAL

## 2025-08-11 ENCOUNTER — OFFICE VISIT (OUTPATIENT)
Dept: UROLOGY | Facility: CLINIC | Age: 40
End: 2025-08-11
Payer: COMMERCIAL

## 2025-08-11 VITALS
DIASTOLIC BLOOD PRESSURE: 105 MMHG | SYSTOLIC BLOOD PRESSURE: 150 MMHG | WEIGHT: 300 LBS | HEART RATE: 89 BPM | HEIGHT: 72 IN | BODY MASS INDEX: 40.63 KG/M2 | OXYGEN SATURATION: 92 %

## 2025-08-11 DIAGNOSIS — Q64.10 EXSTROPHY OF URINARY BLADDER: ICD-10-CM

## 2025-08-11 DIAGNOSIS — T19.1XXD FOREIGN BODY IN BLADDER, SUBSEQUENT ENCOUNTER: Primary | ICD-10-CM

## (undated) DEVICE — NDL COUNTER 20CT 31142493

## (undated) DEVICE — KIT ENDO FIRST STEP DISINFECTANT 200ML W/POUCH EP-4

## (undated) DEVICE — GLOVE PROTEXIS W/NEU-THERA 8.0  2D73TE80

## (undated) DEVICE — DRSG TEGADERM 4X4 3/4" 1626W

## (undated) DEVICE — PROBE SET CYBERWAND LITHO GYRUS RENAL/BLADDER CW-RBP

## (undated) DEVICE — BAG CYSTO TABLE DRAIN

## (undated) DEVICE — SU MONOCRYL 4-0 PS-2 27" UND Y426H

## (undated) DEVICE — BLADE KNIFE SURG 11 WITH HANDLE 4-411

## (undated) DEVICE — SOL WATER IRRIG 1000ML BOTTLE 2F7114

## (undated) DEVICE — NDL PERC ACCESS 18GX20CM M0067001220

## (undated) DEVICE — SOL WATER IRRIG 3000ML BAG 2B7117

## (undated) DEVICE — LIGHT HANDLE X2

## (undated) DEVICE — GLOVE PROTEXIS W/NEU-THERA 7.5  2D73TE75

## (undated) DEVICE — SOL NACL 0.9% IRRIG 3000ML BAG 2B7477

## (undated) DEVICE — DRAPE LAP PEDS DISP 29492

## (undated) DEVICE — DRSG DRAIN 4X4" 7086

## (undated) DEVICE — SYR 50ML CATH TIP W/O NDL 309620

## (undated) DEVICE — DILATOR AMPLATZ RENAL SET G14292

## (undated) DEVICE — PAD CHUX UNDERPAD 30X30"

## (undated) DEVICE — PAD CHUX UNDERPAD 23X24" 7136

## (undated) DEVICE — LINEN GOWN XLG 5407

## (undated) DEVICE — PACK CYSTOSCOPY SBA15CYFSI

## (undated) DEVICE — LINEN TOWEL PACK X5 5464

## (undated) DEVICE — GUIDEWIRE SENSOR DUAL FLEX STR 0.035"X150CM M0066703080

## (undated) DEVICE — DRSG DRAIN 2X2" 7087

## (undated) DEVICE — DRSG TELFA 3X8" 1238

## (undated) DEVICE — TUBING SUCTION 12"X1/4" N612

## (undated) RX ORDER — CEFAZOLIN SODIUM 2 G/100ML
INJECTION, SOLUTION INTRAVENOUS
Status: DISPENSED
Start: 2018-07-10

## (undated) RX ORDER — DEXAMETHASONE SODIUM PHOSPHATE 4 MG/ML
INJECTION, SOLUTION INTRA-ARTICULAR; INTRALESIONAL; INTRAMUSCULAR; INTRAVENOUS; SOFT TISSUE
Status: DISPENSED
Start: 2018-07-10

## (undated) RX ORDER — CEFAZOLIN SODIUM 1 G/3ML
INJECTION, POWDER, FOR SOLUTION INTRAMUSCULAR; INTRAVENOUS
Status: DISPENSED
Start: 2022-09-09

## (undated) RX ORDER — GABAPENTIN 300 MG/1
CAPSULE ORAL
Status: DISPENSED
Start: 2021-01-29

## (undated) RX ORDER — ACETAMINOPHEN 325 MG/1
TABLET ORAL
Status: DISPENSED
Start: 2021-01-29

## (undated) RX ORDER — PROPOFOL 10 MG/ML
INJECTION, EMULSION INTRAVENOUS
Status: DISPENSED
Start: 2021-01-29

## (undated) RX ORDER — FENTANYL CITRATE 50 UG/ML
INJECTION, SOLUTION INTRAMUSCULAR; INTRAVENOUS
Status: DISPENSED
Start: 2018-07-10

## (undated) RX ORDER — AMPICILLIN 2 G/1
INJECTION, POWDER, FOR SOLUTION INTRAVENOUS
Status: DISPENSED
Start: 2021-01-29

## (undated) RX ORDER — EPHEDRINE SULFATE 50 MG/ML
INJECTION, SOLUTION INTRAMUSCULAR; INTRAVENOUS; SUBCUTANEOUS
Status: DISPENSED
Start: 2021-01-29

## (undated) RX ORDER — LIDOCAINE HYDROCHLORIDE 20 MG/ML
INJECTION, SOLUTION EPIDURAL; INFILTRATION; INTRACAUDAL; PERINEURAL
Status: DISPENSED
Start: 2018-07-10

## (undated) RX ORDER — PROPOFOL 10 MG/ML
INJECTION, EMULSION INTRAVENOUS
Status: DISPENSED
Start: 2018-07-10

## (undated) RX ORDER — LIDOCAINE HYDROCHLORIDE 20 MG/ML
INJECTION, SOLUTION EPIDURAL; INFILTRATION; INTRACAUDAL; PERINEURAL
Status: DISPENSED
Start: 2021-01-29

## (undated) RX ORDER — FENTANYL CITRATE 50 UG/ML
INJECTION, SOLUTION INTRAMUSCULAR; INTRAVENOUS
Status: DISPENSED
Start: 2021-01-29

## (undated) RX ORDER — GENTAMICIN 40 MG/ML
INJECTION, SOLUTION INTRAMUSCULAR; INTRAVENOUS
Status: DISPENSED
Start: 2021-01-29

## (undated) RX ORDER — FENTANYL CITRATE 50 UG/ML
INJECTION, SOLUTION INTRAMUSCULAR; INTRAVENOUS
Status: DISPENSED
Start: 2022-09-09

## (undated) RX ORDER — ACETAMINOPHEN 325 MG/1
TABLET ORAL
Status: DISPENSED
Start: 2022-09-09

## (undated) RX ORDER — LIDOCAINE HYDROCHLORIDE 20 MG/ML
JELLY TOPICAL
Status: DISPENSED
Start: 2024-11-25

## (undated) RX ORDER — ONDANSETRON 2 MG/ML
INJECTION INTRAMUSCULAR; INTRAVENOUS
Status: DISPENSED
Start: 2018-07-10

## (undated) RX ORDER — ONDANSETRON 2 MG/ML
INJECTION INTRAMUSCULAR; INTRAVENOUS
Status: DISPENSED
Start: 2021-01-29

## (undated) RX ORDER — GLYCOPYRROLATE 0.2 MG/ML
INJECTION INTRAMUSCULAR; INTRAVENOUS
Status: DISPENSED
Start: 2021-01-29